# Patient Record
Sex: FEMALE | Race: WHITE | Employment: OTHER | URBAN - METROPOLITAN AREA
[De-identification: names, ages, dates, MRNs, and addresses within clinical notes are randomized per-mention and may not be internally consistent; named-entity substitution may affect disease eponyms.]

---

## 2017-03-07 ENCOUNTER — OFFICE VISIT (OUTPATIENT)
Dept: FAMILY MEDICINE CLINIC | Facility: CLINIC | Age: 72
End: 2017-03-07

## 2017-03-07 VITALS
HEIGHT: 62 IN | TEMPERATURE: 98 F | SYSTOLIC BLOOD PRESSURE: 124 MMHG | DIASTOLIC BLOOD PRESSURE: 68 MMHG | HEART RATE: 78 BPM | BODY MASS INDEX: 32.76 KG/M2 | RESPIRATION RATE: 16 BRPM | WEIGHT: 178 LBS

## 2017-03-07 DIAGNOSIS — M54.42 CHRONIC LEFT-SIDED LOW BACK PAIN WITH LEFT-SIDED SCIATICA: ICD-10-CM

## 2017-03-07 DIAGNOSIS — I10 ESSENTIAL HYPERTENSION: Primary | ICD-10-CM

## 2017-03-07 DIAGNOSIS — M79.89 LEG SWELLING: ICD-10-CM

## 2017-03-07 DIAGNOSIS — G89.29 CHRONIC LEFT-SIDED LOW BACK PAIN WITH LEFT-SIDED SCIATICA: ICD-10-CM

## 2017-03-07 DIAGNOSIS — I48.0 PAROXYSMAL A-FIB (HCC): ICD-10-CM

## 2017-03-07 DIAGNOSIS — E78.5 DYSLIPIDEMIA: ICD-10-CM

## 2017-03-07 DIAGNOSIS — R05.8 ACE-INHIBITOR COUGH: ICD-10-CM

## 2017-03-07 DIAGNOSIS — G89.29 CHRONIC LEFT SHOULDER PAIN: ICD-10-CM

## 2017-03-07 DIAGNOSIS — T46.4X5A ACE-INHIBITOR COUGH: ICD-10-CM

## 2017-03-07 DIAGNOSIS — C34.92 ADENOCARCINOMA OF LUNG, LEFT (HCC): ICD-10-CM

## 2017-03-07 DIAGNOSIS — M25.512 CHRONIC LEFT SHOULDER PAIN: ICD-10-CM

## 2017-03-07 DIAGNOSIS — I44.0 1ST DEGREE AV BLOCK: ICD-10-CM

## 2017-03-07 PROCEDURE — 99214 OFFICE O/P EST MOD 30 MIN: CPT | Performed by: INTERNAL MEDICINE

## 2017-03-07 PROCEDURE — 20610 DRAIN/INJ JOINT/BURSA W/O US: CPT | Performed by: INTERNAL MEDICINE

## 2017-03-07 RX ORDER — METHYLPREDNISOLONE ACETATE 40 MG/ML
40 INJECTION, SUSPENSION INTRA-ARTICULAR; INTRALESIONAL; INTRAMUSCULAR; SOFT TISSUE ONCE
Status: COMPLETED | OUTPATIENT
Start: 2017-03-07 | End: 2017-03-07

## 2017-03-07 RX ORDER — TRIAMTERENE AND HYDROCHLOROTHIAZIDE 37.5; 25 MG/1; MG/1
2 CAPSULE ORAL EVERY MORNING
Qty: 180 CAPSULE | Refills: 0 | Status: SHIPPED | OUTPATIENT
Start: 2017-03-07 | End: 2017-06-08

## 2017-03-07 RX ORDER — AMITRIPTYLINE HYDROCHLORIDE 100 MG/1
100 TABLET, FILM COATED ORAL 2 TIMES DAILY
Qty: 180 TABLET | Refills: 3 | Status: SHIPPED | OUTPATIENT
Start: 2017-03-07 | End: 2017-06-08

## 2017-03-07 RX ORDER — TRAMADOL HYDROCHLORIDE 50 MG/1
50 TABLET ORAL EVERY 8 HOURS PRN
Qty: 30 TABLET | Refills: 1 | Status: SHIPPED | COMMUNITY
Start: 2017-03-07 | End: 2017-06-08

## 2017-03-07 RX ORDER — TRIAMTERENE AND HYDROCHLOROTHIAZIDE 37.5; 25 MG/1; MG/1
1 CAPSULE ORAL EVERY MORNING
Qty: 90 CAPSULE | Refills: 0 | Status: SHIPPED | OUTPATIENT
Start: 2017-03-07 | End: 2017-03-07

## 2017-03-07 RX ADMIN — METHYLPREDNISOLONE ACETATE 40 MG: 40 INJECTION, SUSPENSION INTRA-ARTICULAR; INTRALESIONAL; INTRAMUSCULAR; SOFT TISSUE at 14:35:00

## 2017-03-07 NOTE — PROGRESS NOTES
[facility] Internal Medicine Office Note  Chief Complaint:   Patient presents with:  Edema: swelling in both legs and feet   Back Pain: lower back/ pain goes into left hip   Cough: productive/dry cough x 1 year  afib  And 1st deg av block  Should Comment:Caps  Mevacor [Lovastatin]    Swelling    Comment:Tabs  Neurontin [Gabapent*    Swelling    Comment:Tabs;  Radiology Contrast *      Tegretol                Swelling    Comment:Tabs;    Current Outpatient Prescriptions:  Amitriptyline HCl 100 MG Or signs reviewed. Appears stated age, well groomed. With grey hair  Physical Exam    Constitutional: She is obese. She appears well-developed. HENT:   Head: Normocephalic. Cardiovascular: Normal rate. Murmur heard.   Pulmonary/Chest: Effort normal. No injection/aspiration with a 20gauge needle  Injectate: 1ml of 40mg Methylprednisolone  Bandage applied  Specimen Sent: no  Patient tolerated well without complications and wound care instructions given  Followup prn      ACE-inhibitor cough newly dx stop l done. Outcome: Patient verbalizes understanding. Patient is notified to call with any questions, complications, allergies, or worsening or changing symptoms.  Patient is to call with any side effects or complications from the treatments as a result of today

## 2017-03-07 NOTE — PATIENT INSTRUCTIONS
Thank you for choosing Pina Quan MD at [place] [address]  To Do: Shearon Blizzard  1. Use tramadol for pain as needed  2. Steroid injection to shoulder- leave bandage on for 1 day  3.  Stop amlodipine due to leg swelling  Stop lisinopril due to cough  4 and for your safety to discuss with the pharmacist and our office with questions, and to notify us and stop treatment if problems arise, but know that our intention is that the benefits outweigh those potential risks and we strive to make you healthier and

## 2017-03-08 ENCOUNTER — TELEPHONE (OUTPATIENT)
Dept: FAMILY MEDICINE CLINIC | Facility: CLINIC | Age: 72
End: 2017-03-08

## 2017-03-08 NOTE — TELEPHONE ENCOUNTER
Spoke with patient and advised her that she could contact her insurance company to get a covered alternative. Patient stated that she has already contacted her insurance company and she is not able to take any of the alternatives as she has side effects.  P

## 2017-03-08 NOTE — TELEPHONE ENCOUNTER
Please advise patient she must call insurance for alternatives. Pharmacy would not have this information. Ask her to call office back with covered or preferred alternatives after she speaks with insurance co. Thank you!

## 2017-03-08 NOTE — TELEPHONE ENCOUNTER
I was speaking with patient and chart closed. She is on Amitriptyline 100mg daily. She uses for Chiari malformation. I checked good rx and patient can get 30 day supply for $4 at [facility]. She is going to call them and see about filling it there.   She dane

## 2017-04-24 ENCOUNTER — LAB ENCOUNTER (OUTPATIENT)
Dept: LAB | Age: 72
End: 2017-04-24
Attending: INTERNAL MEDICINE
Payer: MEDICARE

## 2017-04-24 ENCOUNTER — HOSPITAL ENCOUNTER (OUTPATIENT)
Dept: CT IMAGING | Age: 72
Discharge: HOME | End: 2017-04-24
Attending: INTERNAL MEDICINE
Payer: MEDICARE

## 2017-04-24 DIAGNOSIS — C34.12 CANCER OF UPPER LOBE OF LEFT LUNG (HCC): ICD-10-CM

## 2017-04-24 PROCEDURE — 83615 LACTATE (LD) (LDH) ENZYME: CPT

## 2017-04-24 PROCEDURE — 85025 COMPLETE CBC W/AUTO DIFF WBC: CPT

## 2017-04-24 PROCEDURE — 36415 COLL VENOUS BLD VENIPUNCTURE: CPT

## 2017-04-24 PROCEDURE — 71250 CT THORAX DX C-: CPT

## 2017-04-24 PROCEDURE — 80053 COMPREHEN METABOLIC PANEL: CPT

## 2017-05-02 ENCOUNTER — OFFICE VISIT (OUTPATIENT)
Dept: HEMATOLOGY/ONCOLOGY | Age: 72
End: 2017-05-02
Attending: INTERNAL MEDICINE
Payer: MEDICARE

## 2017-05-02 VITALS
BODY MASS INDEX: 33 KG/M2 | DIASTOLIC BLOOD PRESSURE: 53 MMHG | HEART RATE: 76 BPM | RESPIRATION RATE: 20 BRPM | OXYGEN SATURATION: 98 % | SYSTOLIC BLOOD PRESSURE: 129 MMHG | WEIGHT: 178 LBS | TEMPERATURE: 98 F

## 2017-05-02 DIAGNOSIS — C34.12 MALIGNANT NEOPLASM OF UPPER LOBE OF LEFT LUNG (HCC): Primary | ICD-10-CM

## 2017-05-02 DIAGNOSIS — D64.9 NORMOCYTIC NORMOCHROMIC ANEMIA: ICD-10-CM

## 2017-05-02 PROCEDURE — 99214 OFFICE O/P EST MOD 30 MIN: CPT | Performed by: INTERNAL MEDICINE

## 2017-05-02 NOTE — PROGRESS NOTES
Cancer Center Progress Note    Patient Name: Travis Gasca   YOB: 1945   Medical Record Number: [id number]   CSN: [id number]   Attending Physician: Oscar Moon M.D.    Referring Physician: Ag Guy MD      Date of Visit: 5/2/2017     PILAR D3) 250-200 MG-UNIT Oral Tab, Take 1 Tab by mouth daily. , Disp: , Rfl:   •  Pediatric Multivit-Minerals-C (FLINTSTONES GUMMIES) Oral Chew Tab, Chew 1 Tab by mouth daily. , Disp: , Rfl:     Past Medical History:  Past Medical History   Diagnosis Date   • Spi SURGICAL HISTORY  1993    Comment craniotomy chiari repair evanston    SHOULDER SURG PROC UNLISTED  1/1/2004    Comment right    OTHER SURGICAL HISTORY  1990    Comment spine repair spina bifida    GASTRIC BYPASS,OBESITY,SB RECONSTRUC  8/2012    Comment mu Comment:Tabs;     Review of Systems:  A 14-point ROS was done with pertinent positives and negative per the HPI    Vital Signs:  /53 mmHg  Pulse 76  Temp(Src) 98.1 °F (36.7 °C) (Tympanic)  Resp 20  Wt 80.74 kg (178 lb)  SpO2 98%      Physical Examina STATED HISTORY: (As transcribed by Technologist)  Patient here for her 6 month follow up appointment for lung cancer. Patient had cancer in her left upper lobe and surgery for it in [place] of 2014.  Patient does not have any other complants.           FIND responded well to IV iron. Recent work-up not c/w iron deficiency. Likely anemia of chronic disease. Continues to decline this. May reconsider if Hgb drops to <10. Labs reviewed with her. RTC 6 months.      Emotional Well Being:  I have assessed the

## 2017-05-02 NOTE — PROGRESS NOTES
Pt here for 6 month MD f/u. Pt notes energy level is low, dealing with a lot of stress and anxiety with 's health. Pt had CT scan 4/24 and labs last week as well. Appetite has been good.  Pt notes burning pain in chest, believes it's stress, is inter

## 2017-05-05 RX ORDER — OMEPRAZOLE 20 MG/1
CAPSULE, DELAYED RELEASE ORAL
Qty: 90 CAPSULE | Refills: 1 | Status: SHIPPED | OUTPATIENT
Start: 2017-05-05 | End: 2017-10-31

## 2017-06-05 NOTE — TELEPHONE ENCOUNTER
Requesting Triamterene-HCTZ  LOV: 3/7/17  RTC: 1-3 months  Last Labs: 4/24/17  Filled: 3/7/17 #180 with 0 refills    Future Appointments  Date Time Provider Nicolas Prakash   6/8/2017 1:30 PM Corie Looney MD [facility] [address] [facility]   11/7/2017 10:00 AM Drake Cobb

## 2017-06-08 ENCOUNTER — TELEPHONE (OUTPATIENT)
Dept: FAMILY MEDICINE CLINIC | Facility: CLINIC | Age: 72
End: 2017-06-08

## 2017-06-08 ENCOUNTER — LAB ENCOUNTER (OUTPATIENT)
Dept: LAB | Age: 72
End: 2017-06-08
Attending: INTERNAL MEDICINE
Payer: MEDICARE

## 2017-06-08 ENCOUNTER — OFFICE VISIT (OUTPATIENT)
Dept: FAMILY MEDICINE CLINIC | Facility: CLINIC | Age: 72
End: 2017-06-08

## 2017-06-08 VITALS
RESPIRATION RATE: 16 BRPM | WEIGHT: 178 LBS | HEIGHT: 62 IN | DIASTOLIC BLOOD PRESSURE: 78 MMHG | HEART RATE: 62 BPM | TEMPERATURE: 98 F | SYSTOLIC BLOOD PRESSURE: 120 MMHG | BODY MASS INDEX: 32.76 KG/M2

## 2017-06-08 DIAGNOSIS — E03.1 CONGENITAL HYPOTHYROIDISM WITHOUT GOITER: ICD-10-CM

## 2017-06-08 DIAGNOSIS — G89.29 CHRONIC LEFT SHOULDER PAIN: ICD-10-CM

## 2017-06-08 DIAGNOSIS — N17.9 ACUTE RENAL FAILURE, UNSPECIFIED ACUTE RENAL FAILURE TYPE (HCC): ICD-10-CM

## 2017-06-08 DIAGNOSIS — I10 ESSENTIAL HYPERTENSION: ICD-10-CM

## 2017-06-08 DIAGNOSIS — E03.1 CONGENITAL HYPOTHYROIDISM WITHOUT GOITER: Primary | ICD-10-CM

## 2017-06-08 DIAGNOSIS — M25.512 CHRONIC LEFT SHOULDER PAIN: ICD-10-CM

## 2017-06-08 DIAGNOSIS — M79.7 FIBROMYALGIA: ICD-10-CM

## 2017-06-08 PROCEDURE — 36415 COLL VENOUS BLD VENIPUNCTURE: CPT

## 2017-06-08 PROCEDURE — 84443 ASSAY THYROID STIM HORMONE: CPT

## 2017-06-08 PROCEDURE — 99214 OFFICE O/P EST MOD 30 MIN: CPT | Performed by: INTERNAL MEDICINE

## 2017-06-08 PROCEDURE — 80048 BASIC METABOLIC PNL TOTAL CA: CPT

## 2017-06-08 PROCEDURE — 84481 FREE ASSAY (FT-3): CPT

## 2017-06-08 RX ORDER — TRIAMTERENE AND HYDROCHLOROTHIAZIDE 37.5; 25 MG/1; MG/1
2 CAPSULE ORAL EVERY MORNING
Qty: 180 CAPSULE | Refills: 3 | Status: SHIPPED | OUTPATIENT
Start: 2017-06-08 | End: 2018-01-30

## 2017-06-08 RX ORDER — TRAMADOL HYDROCHLORIDE 50 MG/1
50 TABLET ORAL EVERY 8 HOURS PRN
Qty: 90 TABLET | Refills: 1 | Status: SHIPPED
Start: 2017-06-08 | End: 2017-10-31

## 2017-06-08 RX ORDER — AMITRIPTYLINE HYDROCHLORIDE 100 MG/1
100 TABLET, FILM COATED ORAL 2 TIMES DAILY
Qty: 180 TABLET | Refills: 3 | Status: SHIPPED | OUTPATIENT
Start: 2017-06-08 | End: 2017-10-31

## 2017-06-08 RX ORDER — TRIAMTERENE AND HYDROCHLOROTHIAZIDE 37.5; 25 MG/1; MG/1
CAPSULE ORAL
Qty: 90 CAPSULE | Refills: 0 | OUTPATIENT
Start: 2017-06-08

## 2017-06-08 NOTE — PROGRESS NOTES
[facility] Internal Medicine Office Note  Chief Complaint:   Patient presents with:  Edema  Blood Pressure  Shoulder Pain  Back Pain  low thyroid  arf  HPI:   This is a 66year old female coming in for  HPI  Chronic L shoulder pain  Pt declined h MG Oral Tab Take 1 tablet (100 mg total) by mouth 2 (two) times daily. Disp: 180 tablet Rfl: 3   TraMADol HCl 50 MG Oral Tab Take 1 tablet (50 mg total) by mouth every 8 (eight) hours as needed for Pain (back and shoulder pain).  Disp: 90 tablet Rfl: 1   OM Musculoskeletal: She exhibits tenderness. ttp behind L shoulder with dec rom   Neurological: She displays normal reflexes. Coordination and gait normal.   Skin: No rash noted. Psychiatric: She has a normal mood and affect.        ASSESSMENT AND PLAN: morning. For leg swelling  -     Amitriptyline HCl 100 MG Oral Tab; Take 1 tablet (100 mg total) by mouth 2 (two) times daily.  -     TraMADol HCl 50 MG Oral Tab;  Take 1 tablet (50 mg total) by mouth every 8 (eight) hours as needed for Pain (back and shoul [facility])     Adenocarcinoma of lung ([address]     Cataract     Right hip pain     Left shoulder pain     Post-thoracotomy pain     Fibromyalgia     Normocytic normochromic anemia     Tinnitus of both ears     Gallstones     Hearing loss in left ear     Chronic le

## 2017-06-08 NOTE — PATIENT INSTRUCTIONS
Thank you for choosing Edy Scott MD at [place] [address]  To Do: Sanjay Ramirez  1. Try the pennsaid to shoulder- massage a little inside there daily  2.  Checkup dr marin in 6-8 month    • Please signup for [facility] CHART, which is electronic access to your your quality of life.     Referrals, and Radiology Information:    If your insurance requires a referral to a specialist, please allow 5 business days to process your referral request.    If Lidya Lockhart MD orders a CT or MRI, it may take up to 10 business

## 2017-06-09 DIAGNOSIS — R94.4 DECREASED GFR: Primary | ICD-10-CM

## 2017-06-09 RX ORDER — HYDROCHLOROTHIAZIDE 25 MG/1
25 TABLET ORAL DAILY
Qty: 30 TABLET | Refills: 0 | Status: SHIPPED | OUTPATIENT
Start: 2017-06-09 | End: 2017-10-31

## 2017-06-09 NOTE — PROGRESS NOTES
Quick Note:    Yimi Lopez her kidney function isn't that great still- can you tell her to stop her triamterene pill. Just change her to hctz 25mg daily- please order it disp 30, and tell her to drink more water, retest bmp in 2-3 weeks, Please notify her of that.

## 2017-06-09 NOTE — TELEPHONE ENCOUNTER
Prior auth for amitriptyline 100mg twice a day has been approved from 3/10/17-6/8/18.  Pharmacy is aware

## 2017-06-16 ENCOUNTER — TELEPHONE (OUTPATIENT)
Dept: FAMILY MEDICINE CLINIC | Facility: CLINIC | Age: 72
End: 2017-06-16

## 2017-06-16 DIAGNOSIS — N18.9 CHRONIC KIDNEY DISEASE, UNSPECIFIED CKD STAGE: Primary | ICD-10-CM

## 2017-06-16 RX ORDER — HYDRALAZINE HYDROCHLORIDE 25 MG/1
25 TABLET, FILM COATED ORAL 3 TIMES DAILY
Qty: 90 TABLET | Refills: 0 | Status: SHIPPED | OUTPATIENT
Start: 2017-06-16 | End: 2017-10-31

## 2017-06-16 NOTE — TELEPHONE ENCOUNTER
Patient informed of MD recommendations, patient verbalized understanding with intent to comply. All questions were answered.     Future Appointments  Date Time Provider Nicolas Prakash   11/1/2017 9:15 AM DEDE SEGAL [facility]   11/1/2017 9:

## 2017-06-16 NOTE — TELEPHONE ENCOUNTER
Patient states that her blood pressure medication, currently taking hydrochlorothiazide 25 MG Oral Tab  Is not working for her.  She reports swelling in bilateral feet, that started to increase on Wednesday, pt states that she feels like she is retaining wa

## 2017-06-16 NOTE — TELEPHONE ENCOUNTER
There are a lot of things happening here    Her BP is high but her kidneys are bad    i need her to make an appt with dr Marcela Zavala renal - please tell her to do that    bp and kidney issues are connected    Start hydralazine 25mg three times daily for HYPERTENS

## 2017-07-06 RX ORDER — HYDROCHLOROTHIAZIDE 25 MG/1
TABLET ORAL
Qty: 30 TABLET | Refills: 0 | OUTPATIENT
Start: 2017-07-06

## 2017-07-06 NOTE — TELEPHONE ENCOUNTER
Requesting Hydrochlorothiazide 25mg daily  LOV: 6/8/17  RTC:   Last Labs:   Filled:  # with  refills    Future Appointments  Date Time Provider Nicolas Martisti   7/20/2017 3:30 PM Rob Tarango MD [facility]   11/1/2017 9:15 AM  [facility]

## 2017-07-20 ENCOUNTER — OFFICE VISIT (OUTPATIENT)
Dept: NEPHROLOGY | Facility: CLINIC | Age: 72
End: 2017-07-20

## 2017-07-20 VITALS
RESPIRATION RATE: 16 BRPM | DIASTOLIC BLOOD PRESSURE: 78 MMHG | HEART RATE: 74 BPM | SYSTOLIC BLOOD PRESSURE: 122 MMHG | WEIGHT: 191 LBS | BODY MASS INDEX: 35 KG/M2

## 2017-07-20 DIAGNOSIS — N18.30 CKD (CHRONIC KIDNEY DISEASE) STAGE 3, GFR 30-59 ML/MIN (HCC): ICD-10-CM

## 2017-07-20 DIAGNOSIS — D64.9 ANEMIA, UNSPECIFIED TYPE: ICD-10-CM

## 2017-07-20 DIAGNOSIS — N17.9 AKI (ACUTE KIDNEY INJURY) (HCC): Primary | ICD-10-CM

## 2017-07-20 PROCEDURE — 99204 OFFICE O/P NEW MOD 45 MIN: CPT | Performed by: INTERNAL MEDICINE

## 2017-07-20 NOTE — PROGRESS NOTES
Nephrology Consult Note    REASON FOR CONSULT: SHAD / CKD 3    ASSESSMENT/PLAN:        1) SHAD / CKD 3- progressive renal dysfunction with serum creatinine increasing from 1.1->2.2 mg/dL since October 2016 is suggestive of intrinsic renal disease such as an or palpitations  Denies SOB/cough/hemoptysis  Denies abd or flank pain  Denies N/V/D  Denies change in urinary habits or gross hematuria  Denies LE edema  Denies skin rashes/myalgias/arthralgias    PMH:  Past Medical History:   Diagnosis Date   • Adenocarc thyroid        PSH:  Past Surgical History:  2010: ABLATION      Comment: svt ablation in sept 2010  No date: APPENDECTOMY  1990: BRAIN SURGERY      Comment: cervical shunt  2016: COLONOSCOPY  8/2012: GASTRIC BYPASS,OBESITY,SB RECONSTRUC      Comment: catalina DAILY Disp: 90 capsule Rfl: 1   METOPROLOL SUCCINATE  MG Oral Tablet 24 Hr TAKE ONE TABLET BY MOUTH ONCE DAILY Disp: 90 tablet Rfl: 3   Levothyroxine Sodium 50 MCG Oral Tab TAKE ONE TABLET BY MOUTH ONCE DAILY Disp: 90 tablet Rfl: 3   aspirin  M MMM  Neck: Supple, no TACHO or thyromegaly  Cardiac: Regular rate and rhythm, S1, S2 normal, no murmur or rub  Lungs: Clear without wheezes, rales, rhonchi.     Abdomen: Soft, non-tender. + bowel sounds, no palpable organomegaly  Extremities: Without clubbing

## 2017-08-03 RX ORDER — LEVOTHYROXINE SODIUM 0.05 MG/1
TABLET ORAL
Qty: 90 TABLET | Refills: 1 | Status: SHIPPED | OUTPATIENT
Start: 2017-08-03 | End: 2018-01-31

## 2017-08-03 NOTE — TELEPHONE ENCOUNTER
Requesting Levothyroxine 50mcg daily  LOV: 6/8/17  RTC: 6 months  Last Labs: 6/8/17  Filled: 8/5/16 #90 with 3 refills    Future Appointments  Date Time Provider Nicolas Prakash   11/1/2017 9:15 AM PF [facility] PF OUTPT [facility]   11/1/2017 9:30 AM

## 2017-08-15 ENCOUNTER — LAB ENCOUNTER (OUTPATIENT)
Dept: LAB | Age: 72
End: 2017-08-15
Attending: INTERNAL MEDICINE
Payer: MEDICARE

## 2017-08-15 ENCOUNTER — HOSPITAL ENCOUNTER (OUTPATIENT)
Dept: ULTRASOUND IMAGING | Age: 72
Discharge: HOME | End: 2017-08-15
Attending: INTERNAL MEDICINE
Payer: MEDICARE

## 2017-08-15 DIAGNOSIS — D64.9 ANEMIA, UNSPECIFIED TYPE: ICD-10-CM

## 2017-08-15 DIAGNOSIS — N17.9 AKI (ACUTE KIDNEY INJURY) (HCC): ICD-10-CM

## 2017-08-15 DIAGNOSIS — N18.30 CKD (CHRONIC KIDNEY DISEASE) STAGE 3, GFR 30-59 ML/MIN (HCC): ICD-10-CM

## 2017-08-15 LAB
ANA SCREEN: NEGATIVE
BASOPHILS # BLD AUTO: 0.03 X10(3) UL (ref 0–0.1)
BASOPHILS NFR BLD AUTO: 0.6 %
BILIRUB UR QL STRIP.AUTO: NEGATIVE
BUN BLD-MCNC: 18 MG/DL (ref 8–20)
CALCIUM BLD-MCNC: 8.8 MG/DL (ref 8.3–10.3)
CHLORIDE: 112 MMOL/L (ref 101–111)
CLARITY UR REFRACT.AUTO: CLEAR
CO2: 21 MMOL/L (ref 22–32)
COLOR UR AUTO: YELLOW
COMPLEMENT C3: 98.8 MG/DL (ref 90–180)
COMPLEMENT C4: 24 MG/DL (ref 10–40)
CREAT BLD-MCNC: 1.44 MG/DL (ref 0.55–1.02)
CREAT UR-SCNC: 59.1 MG/DL
EOSINOPHIL # BLD AUTO: 0.24 X10(3) UL (ref 0–0.3)
EOSINOPHIL NFR BLD AUTO: 4.5 %
ERYTHROCYTE [DISTWIDTH] IN BLOOD BY AUTOMATED COUNT: 14.9 % (ref 11.5–16)
GLUCOSE BLD-MCNC: 88 MG/DL (ref 70–99)
GLUCOSE UR STRIP.AUTO-MCNC: NEGATIVE MG/DL
HCT VFR BLD AUTO: 28.7 % (ref 34–50)
HGB BLD-MCNC: 9 G/DL (ref 12–16)
IMMATURE GRANULOCYTE COUNT: 0.01 X10(3) UL (ref 0–1)
IMMATURE GRANULOCYTE RATIO %: 0.2 %
KETONES UR STRIP.AUTO-MCNC: NEGATIVE MG/DL
LYMPHOCYTES # BLD AUTO: 1.5 X10(3) UL (ref 0.9–4)
LYMPHOCYTES NFR BLD AUTO: 28.2 %
MCH RBC QN AUTO: 27.4 PG (ref 27–33.2)
MCHC RBC AUTO-ENTMCNC: 31.4 G/DL (ref 31–37)
MCV RBC AUTO: 87.2 FL (ref 81–100)
MONOCYTES # BLD AUTO: 0.54 X10(3) UL (ref 0.1–0.6)
MONOCYTES NFR BLD AUTO: 10.2 %
NEUTROPHIL ABS PRELIM: 2.99 X10 (3) UL (ref 1.3–6.7)
NEUTROPHILS # BLD AUTO: 2.99 X10(3) UL (ref 1.3–6.7)
NEUTROPHILS NFR BLD AUTO: 56.3 %
NITRITE UR QL STRIP.AUTO: NEGATIVE
PH UR STRIP.AUTO: 5 [PH] (ref 4.5–8)
PLATELET # BLD AUTO: 290 10(3)UL (ref 150–450)
POTASSIUM SERPL-SCNC: 4 MMOL/L (ref 3.6–5.1)
PROT UR STRIP.AUTO-MCNC: NEGATIVE MG/DL
PROT UR-MCNC: 16.6 MG/DL
RBC # BLD AUTO: 3.29 X10(6)UL (ref 3.8–5.1)
RBC UR QL AUTO: NEGATIVE
RED CELL DISTRIBUTION WIDTH-SD: 47.9 FL (ref 35.1–46.3)
SODIUM SERPL-SCNC: 143 MMOL/L (ref 136–144)
SP GR UR STRIP.AUTO: 1.01 (ref 1–1.03)
UROBILINOGEN UR STRIP.AUTO-MCNC: <2 MG/DL
WBC # BLD AUTO: 5.3 X10(3) UL (ref 4–13)

## 2017-08-15 PROCEDURE — 80048 BASIC METABOLIC PNL TOTAL CA: CPT

## 2017-08-15 PROCEDURE — 86334 IMMUNOFIX E-PHORESIS SERUM: CPT

## 2017-08-15 PROCEDURE — 83876 ASSAY MYELOPEROXIDASE: CPT

## 2017-08-15 PROCEDURE — 86255 FLUORESCENT ANTIBODY SCREEN: CPT

## 2017-08-15 PROCEDURE — 83516 IMMUNOASSAY NONANTIBODY: CPT

## 2017-08-15 PROCEDURE — 36415 COLL VENOUS BLD VENIPUNCTURE: CPT

## 2017-08-15 PROCEDURE — 76770 US EXAM ABDO BACK WALL COMP: CPT | Performed by: INTERNAL MEDICINE

## 2017-08-15 PROCEDURE — 83883 ASSAY NEPHELOMETRY NOT SPEC: CPT

## 2017-08-15 PROCEDURE — 85025 COMPLETE CBC W/AUTO DIFF WBC: CPT

## 2017-08-15 PROCEDURE — 82570 ASSAY OF URINE CREATININE: CPT

## 2017-08-15 PROCEDURE — 86038 ANTINUCLEAR ANTIBODIES: CPT

## 2017-08-15 PROCEDURE — 81001 URINALYSIS AUTO W/SCOPE: CPT

## 2017-08-15 PROCEDURE — 87086 URINE CULTURE/COLONY COUNT: CPT

## 2017-08-15 PROCEDURE — 84156 ASSAY OF PROTEIN URINE: CPT

## 2017-08-15 PROCEDURE — 86160 COMPLEMENT ANTIGEN: CPT

## 2017-08-15 PROCEDURE — 84165 PROTEIN E-PHORESIS SERUM: CPT

## 2017-08-17 LAB
GBM AB, IGG (MAFD): 0 AU/ML
MYELOPEROX ANTIBODIES, IGG: 0 AU/ML
SERINE PROTEASE3, IGG: 9 AU/ML

## 2017-08-18 ENCOUNTER — TELEPHONE (OUTPATIENT)
Dept: NEPHROLOGY | Facility: CLINIC | Age: 72
End: 2017-08-18

## 2017-08-18 LAB
A/G RATIO: 1.65
ALBUMIN, SERUM: 3.74 G/DL (ref 3.5–4.8)
ALPHA-1 GLOBULIN: 0.19 G/DL (ref 0.1–0.3)
ALPHA-2 GLOBULIN: 0.79 G/DL (ref 0.6–1)
BETA GLOBULIN: 0.74 G/DL (ref 0.7–1.2)
GAMMA GLOBULIN: 0.54 G/DL (ref 0.6–1.6)
KAPPA FREE LIGHT CHAIN: 3.29 MG/DL (ref 0.33–1.94)
KAPPA/LAMBDA FLC RATIO: 1.51 (ref 0.26–1.65)
LAMBDA FREE LIGHT CHAIN: 2.18 MG/DL (ref 0.57–2.63)
TOTAL PROTEIN,SERUM: 6 G/DL (ref 6.1–8.3)

## 2017-08-18 RX ORDER — METOPROLOL SUCCINATE 100 MG/1
TABLET, EXTENDED RELEASE ORAL
Qty: 90 TABLET | Refills: 3 | Status: SHIPPED | OUTPATIENT
Start: 2017-08-18 | End: 2018-08-17

## 2017-08-18 NOTE — TELEPHONE ENCOUNTER
Requesting Metoprolol succinate ER 100mg  LOV: 6/8/17  RTC: 6 mos  Last Labs: 8/15/17  Filled: 8/22/16 #90 with 3 refills  Passes protocol  Future Appointments  Date Time Provider Nicolas Prakash   11/1/2017 9:15 AM DEDE AGUAYO OUTPT [facility]

## 2017-08-22 NOTE — TELEPHONE ENCOUNTER
Discussed with pt- repeat Cr much improved at 1.4 mg/dl; UA bland; no proteinuria / hematuria; SPEP / serologies all neg; US with nonspecific findings c/w CKD- meds benign- no need for further w/u; OK to continue usual thiazide / PPI- will f/u prn- thx fan

## 2017-10-30 RX ORDER — OMEPRAZOLE 20 MG/1
CAPSULE, DELAYED RELEASE ORAL
Qty: 90 CAPSULE | Refills: 1 | Status: CANCELLED | OUTPATIENT
Start: 2017-10-30

## 2017-10-31 ENCOUNTER — OFFICE VISIT (OUTPATIENT)
Dept: FAMILY MEDICINE CLINIC | Facility: CLINIC | Age: 72
End: 2017-10-31

## 2017-10-31 VITALS
SYSTOLIC BLOOD PRESSURE: 130 MMHG | WEIGHT: 190 LBS | TEMPERATURE: 98 F | HEART RATE: 68 BPM | HEIGHT: 62 IN | DIASTOLIC BLOOD PRESSURE: 66 MMHG | RESPIRATION RATE: 16 BRPM | BODY MASS INDEX: 34.96 KG/M2

## 2017-10-31 DIAGNOSIS — J01.10 ACUTE NON-RECURRENT FRONTAL SINUSITIS: ICD-10-CM

## 2017-10-31 DIAGNOSIS — E03.1 CONGENITAL HYPOTHYROIDISM WITHOUT GOITER: Primary | ICD-10-CM

## 2017-10-31 DIAGNOSIS — M25.512 CHRONIC LEFT SHOULDER PAIN: ICD-10-CM

## 2017-10-31 DIAGNOSIS — N18.30 CKD (CHRONIC KIDNEY DISEASE) STAGE 3, GFR 30-59 ML/MIN (HCC): ICD-10-CM

## 2017-10-31 DIAGNOSIS — G89.29 CHRONIC LEFT SHOULDER PAIN: ICD-10-CM

## 2017-10-31 DIAGNOSIS — M79.7 FIBROMYALGIA: ICD-10-CM

## 2017-10-31 DIAGNOSIS — R52 WHOLE BODY PAIN: ICD-10-CM

## 2017-10-31 PROCEDURE — 99214 OFFICE O/P EST MOD 30 MIN: CPT | Performed by: INTERNAL MEDICINE

## 2017-10-31 RX ORDER — MONTELUKAST SODIUM 10 MG/1
10 TABLET ORAL NIGHTLY
Qty: 30 TABLET | Refills: 0 | Status: SHIPPED | OUTPATIENT
Start: 2017-10-31 | End: 2017-12-14 | Stop reason: ALTCHOICE

## 2017-10-31 RX ORDER — OMEPRAZOLE 20 MG/1
CAPSULE, DELAYED RELEASE ORAL
Qty: 90 CAPSULE | Refills: 3 | Status: SHIPPED | OUTPATIENT
Start: 2017-10-31 | End: 2018-04-23 | Stop reason: ALTCHOICE

## 2017-10-31 RX ORDER — TRAMADOL HYDROCHLORIDE 50 MG/1
100 TABLET ORAL EVERY 8 HOURS PRN
Qty: 180 TABLET | Refills: 1 | Status: SHIPPED
Start: 2017-10-31 | End: 2018-01-05

## 2017-10-31 RX ORDER — LORATADINE AND PSEUDOEPHEDRINE 10; 240 MG/1; MG/1
1 TABLET, EXTENDED RELEASE ORAL DAILY
Qty: 20 TABLET | Refills: 1 | Status: SHIPPED
Start: 2017-10-31 | End: 2017-12-14 | Stop reason: ALTCHOICE

## 2017-10-31 RX ORDER — AMITRIPTYLINE HYDROCHLORIDE 100 MG/1
100 TABLET, FILM COATED ORAL 2 TIMES DAILY
Qty: 180 TABLET | Refills: 3 | Status: SHIPPED | OUTPATIENT
Start: 2017-10-31 | End: 2018-11-05

## 2017-10-31 RX ORDER — IPRATROPIUM BROMIDE 42 UG/1
2 SPRAY, METERED NASAL 4 TIMES DAILY
Qty: 15 ML | Refills: 0 | Status: SHIPPED | OUTPATIENT
Start: 2017-10-31 | End: 2017-12-14 | Stop reason: ALTCHOICE

## 2017-10-31 RX ORDER — TRAMADOL HYDROCHLORIDE 50 MG/1
50 TABLET ORAL EVERY 8 HOURS PRN
Qty: 90 TABLET | Refills: 1 | Status: SHIPPED
Start: 2017-10-31 | End: 2017-10-31

## 2017-10-31 RX ORDER — MONTELUKAST SODIUM 10 MG/1
10 TABLET ORAL NIGHTLY
Qty: 30 TABLET | Refills: 0 | Status: SHIPPED | OUTPATIENT
Start: 2017-10-31 | End: 2017-10-31

## 2017-10-31 NOTE — PATIENT INSTRUCTIONS
Thank you for choosing Jimmy Mcnally MD at [place] [address]  To Do: Graham Leventhal  1.  Pickup at [address] 10mg daily, plus claritin D once a day, atrovent nasal spray on R nostril to clear out the ear  2. amitryptyline refilled at [facility] For your safety, read the entire package insert of all medicines prescribed to you and be aware of all of the risks of treatment even beyond those discussed today.  All therapies have potential risk of harm or side effects or medication interactions.  It i

## 2017-10-31 NOTE — PROGRESS NOTES
[facility] Internal Medicine Office Note  Chief Complaint:   Patient presents with:  Back Pain: low back pain down to leg   Tinnitus: thyroid  Fibromyalgia  ckd    HPI:   This is a 74year old female coming in for  [facility]  Chronic care fu  1.  Fibro Nasal Solution 2 sprays by Nasal route 4 (four) times daily. Disp: 15 mL Rfl: 0   Montelukast Sodium 10 MG Oral Tab Take 1 tablet (10 mg total) by mouth nightly.  Sinus congestion Disp: 30 tablet Rfl: 0   Amitriptyline HCl 100 MG Oral Tab Take 1 tablet (100 oriented to person, place, and time and obese. She appears well-developed. Cardiovascular: Normal rate. No murmur heard. Pulmonary/Chest: Effort normal. No respiratory distress. She has no wheezes. Abdominal: Soft. She exhibits no distension.  There (100 mg total) by mouth 2 (two) times daily.  -     Discontinue: TraMADol HCl 50 MG Oral Tab; Take 1 tablet (50 mg total) by mouth every 8 (eight) hours as needed for Pain (back and shoulder pain).   -     omeprazole 20 MG Oral Capsule Delayed Release; TAKE result of today.    Patient Active Problem List:     Essential hypertension     Dyslipidemia     GERD (gastroesophageal reflux disease)     H/O gastric bypass     Arthritis of knee, left     1st degree AV block     H/O spina bifida     Arthritis of knee

## 2017-11-01 ENCOUNTER — LAB ENCOUNTER (OUTPATIENT)
Dept: LAB | Age: 72
End: 2017-11-01
Attending: INTERNAL MEDICINE
Payer: MEDICARE

## 2017-11-01 ENCOUNTER — HOSPITAL ENCOUNTER (OUTPATIENT)
Dept: CT IMAGING | Age: 72
Discharge: HOME | End: 2017-11-01
Attending: INTERNAL MEDICINE
Payer: MEDICARE

## 2017-11-01 DIAGNOSIS — D64.9 NORMOCYTIC NORMOCHROMIC ANEMIA: ICD-10-CM

## 2017-11-01 DIAGNOSIS — D64.9 NORMOCYTIC ANEMIA: ICD-10-CM

## 2017-11-01 DIAGNOSIS — C34.12 MALIGNANT NEOPLASM OF UPPER LOBE OF LEFT LUNG (HCC): ICD-10-CM

## 2017-11-01 PROCEDURE — 71250 CT THORAX DX C-: CPT | Performed by: INTERNAL MEDICINE

## 2017-11-01 PROCEDURE — 83540 ASSAY OF IRON: CPT

## 2017-11-01 PROCEDURE — 36415 COLL VENOUS BLD VENIPUNCTURE: CPT

## 2017-11-01 PROCEDURE — 80053 COMPREHEN METABOLIC PANEL: CPT

## 2017-11-01 PROCEDURE — 83615 LACTATE (LD) (LDH) ENZYME: CPT

## 2017-11-01 PROCEDURE — 83550 IRON BINDING TEST: CPT

## 2017-11-01 PROCEDURE — 85025 COMPLETE CBC W/AUTO DIFF WBC: CPT

## 2017-11-07 ENCOUNTER — OFFICE VISIT (OUTPATIENT)
Dept: HEMATOLOGY/ONCOLOGY | Age: 72
End: 2017-11-07
Attending: INTERNAL MEDICINE
Payer: MEDICARE

## 2017-11-07 VITALS
TEMPERATURE: 97 F | WEIGHT: 190 LBS | OXYGEN SATURATION: 99 % | BODY MASS INDEX: 35 KG/M2 | HEART RATE: 66 BPM | SYSTOLIC BLOOD PRESSURE: 124 MMHG | DIASTOLIC BLOOD PRESSURE: 69 MMHG | RESPIRATION RATE: 20 BRPM

## 2017-11-07 DIAGNOSIS — C34.12 MALIGNANT NEOPLASM OF UPPER LOBE OF LEFT LUNG (HCC): ICD-10-CM

## 2017-11-07 DIAGNOSIS — D64.9 NORMOCYTIC ANEMIA: Primary | ICD-10-CM

## 2017-11-07 PROCEDURE — 99214 OFFICE O/P EST MOD 30 MIN: CPT | Performed by: INTERNAL MEDICINE

## 2017-11-07 NOTE — PROGRESS NOTES
Pt here for 6 month MD f/u. Pt notes having lower back pain that shoots down leg for about a month now, seeing Dr. Reena Mac for it, on Tramadol. Pt notes some sinus issues. Pt has low energy level with back pain, appetite is good. Pt has no further complaints.

## 2017-11-07 NOTE — PROGRESS NOTES
Cancer Center Progress Note    Patient Name: Nisha Mares   YOB: 1945   Medical Record Number: [id number]   CSN: [id number]   Attending Physician: Angel Luis Davis M.D.    Referring Physician: Ana Laura Argueta MD    Date of Visit: 11/7/2017     PILAR 8 (eight) hours as needed for Pain (back and shoulder pain). , Disp: 180 tablet, Rfl: 1  •  METOPROLOL SUCCINATE  MG Oral Tablet 24 Hr, TAKE ONE TABLET BY MOUTH ONCE DAILY, Disp: 90 tablet, Rfl: 3  •  LEVOTHYROXINE SODIUM 50 MCG Oral Tab, TAKE ONE TAB 9/20/2010   • Pneumonia, organism unspecified(486)    • Reflux esophagitis    • Right hip pain 12/17/2014    R hip pain   • S/P knee replacement 8/5/2013    L 2013   • S/P  shunt 1990    cervical spine shunt surgery   • Shortness of breath    • Shoulder Psychosocial History:    Social History  Social History   Marital status:   Spouse name: N/A    Years of education: N/A  Number of children: N/A     Occupational History  None on file     Social History Main Topics   Smoking status: Naomi Wild Date    11/01/2017   K 4.5 11/01/2017    11/01/2017   CO2 24.0 11/01/2017   BUN 26 (H) 11/01/2017   CREATSERUM 1.98 (H) 11/01/2017   GLU 85 11/01/2017   CA 9.0 11/01/2017   ALKPHO 106 11/01/2017   ALT 18 11/01/2017   AST 10 (L) 11/01/2017   KASSANDRA measuring 4.6 x 6.8 cm.  Correlation with history in this region is suggested.     =====  CONCLUSION:  Overall stable appearance of the chest. There is a stable partially calcified nodular lesion at the right lung base along with several calcified granuloma

## 2017-12-14 ENCOUNTER — OFFICE VISIT (OUTPATIENT)
Dept: FAMILY MEDICINE CLINIC | Facility: CLINIC | Age: 72
End: 2017-12-14

## 2017-12-14 VITALS
RESPIRATION RATE: 16 BRPM | HEIGHT: 62 IN | BODY MASS INDEX: 34.96 KG/M2 | HEART RATE: 72 BPM | TEMPERATURE: 97 F | DIASTOLIC BLOOD PRESSURE: 74 MMHG | WEIGHT: 190 LBS | SYSTOLIC BLOOD PRESSURE: 122 MMHG

## 2017-12-14 DIAGNOSIS — L29.9 PRURITUS: Primary | ICD-10-CM

## 2017-12-14 DIAGNOSIS — D64.9 NORMOCYTIC ANEMIA: ICD-10-CM

## 2017-12-14 DIAGNOSIS — N18.30 CKD (CHRONIC KIDNEY DISEASE) STAGE 3, GFR 30-59 ML/MIN (HCC): ICD-10-CM

## 2017-12-14 PROBLEM — G89.29 CHRONIC PAIN OF BOTH SHOULDERS: Status: ACTIVE | Noted: 2017-06-08

## 2017-12-14 PROBLEM — M25.512 CHRONIC PAIN OF BOTH SHOULDERS: Status: ACTIVE | Noted: 2017-06-08

## 2017-12-14 PROBLEM — M25.511 CHRONIC PAIN OF BOTH SHOULDERS: Status: ACTIVE | Noted: 2017-06-08

## 2017-12-14 PROCEDURE — 99213 OFFICE O/P EST LOW 20 MIN: CPT | Performed by: FAMILY MEDICINE

## 2017-12-14 NOTE — PATIENT INSTRUCTIONS
Please ask Dr. Jory Nichols about your itching:   Could it be related to the high kappa light chains that you had done on your labs? If not see Dr. To Villarreal (renal) to see if your kidneys could be causing this.     May do a trial off the triamterene/hydrochlorothiaz

## 2017-12-14 NOTE — PROGRESS NOTES
[facility] Visit Note  12/14/2017      Subjective:      Patient ID: Morro Arriaga is a 74year old female. Chief Complaint:  Patient presents with:  Itching: x 6 wks she's been itching all over her body.   Imm/Inj: declines the flu vaccine showering  -cont benadryl      Diff dx: plasma dyscrasia, uremia but has only CKD stage 3, dry skin, medication side effect, vs other. Return for f/u itching after seeing your oncologist & kidney doctor if needed.

## 2017-12-19 ENCOUNTER — OFFICE VISIT (OUTPATIENT)
Dept: HEMATOLOGY/ONCOLOGY | Age: 72
End: 2017-12-19
Attending: INTERNAL MEDICINE
Payer: MEDICARE

## 2017-12-19 VITALS
WEIGHT: 190 LBS | RESPIRATION RATE: 20 BRPM | HEART RATE: 58 BPM | TEMPERATURE: 98 F | SYSTOLIC BLOOD PRESSURE: 144 MMHG | DIASTOLIC BLOOD PRESSURE: 71 MMHG | BODY MASS INDEX: 35 KG/M2 | OXYGEN SATURATION: 99 %

## 2017-12-19 DIAGNOSIS — D64.9 NORMOCYTIC ANEMIA: ICD-10-CM

## 2017-12-19 DIAGNOSIS — L29.9 ITCHING: Primary | ICD-10-CM

## 2017-12-19 DIAGNOSIS — C34.12 MALIGNANT NEOPLASM OF UPPER LOBE OF LEFT LUNG (HCC): ICD-10-CM

## 2017-12-19 PROCEDURE — 99214 OFFICE O/P EST MOD 30 MIN: CPT | Performed by: INTERNAL MEDICINE

## 2017-12-19 RX ORDER — HYDROXYZINE HYDROCHLORIDE 25 MG/1
TABLET, FILM COATED ORAL EVERY 6 HOURS PRN
Qty: 60 TABLET | Refills: 1 | Status: SHIPPED | OUTPATIENT
Start: 2017-12-19 | End: 2018-01-30 | Stop reason: ALTCHOICE

## 2017-12-19 NOTE — PROGRESS NOTES
Cancer Center Progress Note    Patient Name: Demetra Goodpasture   YOB: 1945   Medical Record Number: [id number]   CSN: [id number]   Attending Physician: Chelsea Prajapati M.D.    Referring Physician: MD Elmer Gregorio MD    Date of tablet, Rfl: 1  •  METOPROLOL SUCCINATE  MG Oral Tablet 24 Hr, TAKE ONE TABLET BY MOUTH ONCE DAILY, Disp: 90 tablet, Rfl: 3  •  LEVOTHYROXINE SODIUM 50 MCG Oral Tab, TAKE ONE TABLET BY MOUTH ONCE DAILY, Disp: 90 tablet, Rfl: 1  •  Triamterene-HCTZ 37 • Right hip pain 12/17/2014    R hip pain   • S/P knee replacement 8/5/2013    L 2013   • S/P  shunt 1990    cervical spine shunt surgery   • Shortness of breath    • Shoulder joint dysfunction 2004    R fracture after fall, chronically damaged   • Sp   Spouse name: N/A    Years of education: N/A  Number of children: N/A     Occupational History  None on file     Social History Main Topics   Smoking status: Former Smoker     Quit date: 3/16/1987    Smokeless tobacco: Never Used    Alcohol use No 11/01/2017   MPV 10.9 02/11/2013       Lab Results  Component Value Date    12/19/2017   K 4.7 12/19/2017    12/19/2017   CO2 23.0 12/19/2017   BUN 22 (H) 12/19/2017   CREATSERUM 2.17 (H) 12/19/2017   GLU 87 12/19/2017   CA 8.4 12/19/2017   ALK appearance with probable areas of erosion and expansile lytic region measuring 4.6 x 6.8 cm.  Correlation with history in this region is suggested.     =====  CONCLUSION:  Overall stable appearance of the chest. There is a stable partially calcified nodular will call her with test results and recommendations as indicated. Otherwise she will keep her previously scheduled appointment. Emotional Well Being:  I have assessed the patient's emotional well-being and any concerns about anxiety or depression.   We

## 2017-12-19 NOTE — PROGRESS NOTES
Pt here for 3 week MD f/u. Pt has had all over body itching for a couple months now, but has gotten much worse in the last 2 weeks. Pt's PCP recommended she see Dr. Katelynn Martin and also Dr. Aleksandr Randall.      Education Record    Learner:  Patient    Disease / Diagnosis

## 2017-12-20 DIAGNOSIS — L29.9 ITCHING: ICD-10-CM

## 2017-12-20 RX ORDER — HYDROXYZINE HYDROCHLORIDE 25 MG/1
TABLET, FILM COATED ORAL EVERY 6 HOURS PRN
Qty: 60 TABLET | Refills: 1
Start: 2017-12-20

## 2017-12-20 NOTE — TELEPHONE ENCOUNTER
From: Roni Hernandez  Sent: 12/20/2017 6:56 AM CST  Subject: Medication Renewal Request    Roni Hernandez would like a refill of the following medications:     HydrOXYzine HCl 25 MG Oral Tab Kerri Christina MD]   Patient Comment: HOW DO I PRINT [address]

## 2017-12-22 ENCOUNTER — TELEPHONE (OUTPATIENT)
Dept: HEMATOLOGY/ONCOLOGY | Facility: HOSPITAL | Age: 72
End: 2017-12-22

## 2017-12-22 NOTE — TELEPHONE ENCOUNTER
Called to go over lab results. Not available and left message. Iron levels low and would recommend IV iron. Tryptase mildly high but could be related to CKD and for now will plan or rechecking this is a few months.  Pending how her Hgb improves, repeat tryp

## 2017-12-26 PROBLEM — D50.9 IRON DEFICIENCY ANEMIA: Status: ACTIVE | Noted: 2017-12-26

## 2017-12-26 RX ORDER — DIPHENHYDRAMINE HYDROCHLORIDE 50 MG/ML
50 INJECTION INTRAMUSCULAR; INTRAVENOUS ONCE
Status: CANCELLED | OUTPATIENT
Start: 2017-12-26

## 2017-12-26 NOTE — TELEPHONE ENCOUNTER
Called her with lab results. Iron levels low again and discussed IV iron which we will arrange at our [place] office. Tryptase levels can be high in CKD. Will repeat- if persistently elevated may need bone marrow biopsy.  Also recommended she see Derm fo

## 2018-01-02 ENCOUNTER — OFFICE VISIT (OUTPATIENT)
Dept: HEMATOLOGY/ONCOLOGY | Age: 73
End: 2018-01-02
Attending: INTERNAL MEDICINE
Payer: MEDICARE

## 2018-01-02 VITALS
HEART RATE: 77 BPM | DIASTOLIC BLOOD PRESSURE: 81 MMHG | SYSTOLIC BLOOD PRESSURE: 132 MMHG | OXYGEN SATURATION: 98 % | TEMPERATURE: 98 F | RESPIRATION RATE: 20 BRPM

## 2018-01-02 DIAGNOSIS — D50.8 OTHER IRON DEFICIENCY ANEMIA: Primary | ICD-10-CM

## 2018-01-02 DIAGNOSIS — Z98.84 H/O GASTRIC BYPASS: ICD-10-CM

## 2018-01-02 PROCEDURE — 96374 THER/PROPH/DIAG INJ IV PUSH: CPT

## 2018-01-02 RX ORDER — DIPHENHYDRAMINE HYDROCHLORIDE 50 MG/ML
50 INJECTION INTRAMUSCULAR; INTRAVENOUS ONCE
Status: CANCELLED | OUTPATIENT
Start: 2018-01-02

## 2018-01-02 NOTE — PROGRESS NOTES
Education Record    Learner:  Patient    Disease / Ivan De La Torre    Barriers / Limitations:  None    Method:  Brief focused, printed material and  reinforcement    General Topics:  Plan of care reviewed    Outcome:  Shows understanding

## 2018-01-04 RX ORDER — TRAMADOL HYDROCHLORIDE 50 MG/1
TABLET ORAL
Qty: 90 TABLET | Refills: 1 | OUTPATIENT
Start: 2018-01-04

## 2018-01-04 NOTE — TELEPHONE ENCOUNTER
Requesting Tramadol  LOV: 12/14/17 (acute); 10/31/17  RTC: prn  Last Relevant Labs: n/a  Filled: 10/31/17 #180 with 1 refills    Future Appointments  Date Time Provider Nicolas Prakash   1/9/2018 1:00 PM PF TX RN1 PF CHEMO [facility] [place]   5/8/2018 10:00 A

## 2018-01-05 ENCOUNTER — TELEPHONE (OUTPATIENT)
Dept: HEMATOLOGY/ONCOLOGY | Facility: HOSPITAL | Age: 73
End: 2018-01-05

## 2018-01-05 NOTE — TELEPHONE ENCOUNTER
Please ask pt how she is taking the tramadol. Previous scripts for tramadol were for 1# BID, #90 and then the last script was for 2# q 8hr, #180.  [place] it, but I want to be sure which I am mishel

## 2018-01-05 NOTE — TELEPHONE ENCOUNTER
It does not appear patient has filled both scripts from 10/31/17. Contacted pharmacy and confirm last fill was 10/20/17 for #90 and patient never filled prescription from 10/31/17. Contacted patient to discuss further.  Patient states he states she has

## 2018-01-05 NOTE — TELEPHONE ENCOUNTER
Pt sts she is going to see Dr Sean Gaines and not Dr Sara Whittaker. Pt sts when she was in to see Dr Sean Gaines on 12/14/17 she was told not to come back until she was seen by her other speciality drs.   Pt sts she does not have an apt with her specialist until mid [address]

## 2018-01-08 RX ORDER — TRAMADOL HYDROCHLORIDE 50 MG/1
50 TABLET ORAL DAILY
Qty: 90 TABLET | Refills: 1 | Status: SHIPPED
Start: 2018-01-08 | End: 2018-03-26

## 2018-01-08 NOTE — TELEPHONE ENCOUNTER
Per pt, takes 1 Tramadol every night to alleviate pain in order to sleep. Verified with patient that she takes 1 tablet once a day. #90 filled to [facility] in [place].

## 2018-01-09 ENCOUNTER — OFFICE VISIT (OUTPATIENT)
Dept: HEMATOLOGY/ONCOLOGY | Age: 73
End: 2018-01-09
Attending: INTERNAL MEDICINE
Payer: MEDICARE

## 2018-01-09 VITALS
RESPIRATION RATE: 20 BRPM | HEART RATE: 84 BPM | DIASTOLIC BLOOD PRESSURE: 60 MMHG | OXYGEN SATURATION: 100 % | TEMPERATURE: 98 F | SYSTOLIC BLOOD PRESSURE: 90 MMHG

## 2018-01-09 DIAGNOSIS — Z98.84 H/O GASTRIC BYPASS: ICD-10-CM

## 2018-01-09 DIAGNOSIS — D64.9 NORMOCYTIC NORMOCHROMIC ANEMIA: Primary | ICD-10-CM

## 2018-01-09 DIAGNOSIS — D50.8 OTHER IRON DEFICIENCY ANEMIA: Primary | ICD-10-CM

## 2018-01-09 PROCEDURE — 96374 THER/PROPH/DIAG INJ IV PUSH: CPT

## 2018-01-09 RX ORDER — DIPHENHYDRAMINE HYDROCHLORIDE 50 MG/ML
50 INJECTION INTRAMUSCULAR; INTRAVENOUS ONCE
Status: CANCELLED | OUTPATIENT
Start: 2018-01-09

## 2018-01-09 NOTE — PROGRESS NOTES
Education Record    Learner:  Patient    Disease / Jose David Jalloh    Barriers / Limitations:  None    Method:  Brief focused, printed material and  reinforcement    General Topics:  Plan of care reviewed    Outcome:  Shows understanding

## 2018-01-09 NOTE — TELEPHONE ENCOUNTER
Rx approved by Dr. Nallely Robbins and signed. Faxed today to [facility] and confirmed.     Time: one  min

## 2018-01-18 ENCOUNTER — TELEPHONE (OUTPATIENT)
Dept: HEMATOLOGY/ONCOLOGY | Facility: HOSPITAL | Age: 73
End: 2018-01-18

## 2018-01-30 ENCOUNTER — OFFICE VISIT (OUTPATIENT)
Dept: FAMILY MEDICINE CLINIC | Facility: CLINIC | Age: 73
End: 2018-01-30

## 2018-01-30 VITALS
DIASTOLIC BLOOD PRESSURE: 70 MMHG | BODY MASS INDEX: 34.96 KG/M2 | TEMPERATURE: 100 F | RESPIRATION RATE: 18 BRPM | HEART RATE: 60 BPM | WEIGHT: 190 LBS | SYSTOLIC BLOOD PRESSURE: 120 MMHG | HEIGHT: 62 IN

## 2018-01-30 DIAGNOSIS — L29.9 PRURITUS: Primary | ICD-10-CM

## 2018-01-30 DIAGNOSIS — N18.30 CKD (CHRONIC KIDNEY DISEASE) STAGE 3, GFR 30-59 ML/MIN (HCC): ICD-10-CM

## 2018-01-30 DIAGNOSIS — E78.5 DYSLIPIDEMIA: ICD-10-CM

## 2018-01-30 DIAGNOSIS — K52.9 CHRONIC DIARRHEA: ICD-10-CM

## 2018-01-30 PROCEDURE — 99214 OFFICE O/P EST MOD 30 MIN: CPT | Performed by: FAMILY MEDICINE

## 2018-01-30 RX ORDER — TRIAMTERENE AND HYDROCHLOROTHIAZIDE 37.5; 25 MG/1; MG/1
2 CAPSULE ORAL EVERY MORNING
Qty: 180 CAPSULE | Refills: 1 | Status: SHIPPED | OUTPATIENT
Start: 2018-01-30 | End: 2018-02-27

## 2018-01-30 RX ORDER — CHOLESTYRAMINE 4 G/9G
4 POWDER, FOR SUSPENSION ORAL DAILY
Qty: 90 EACH | Refills: 0 | Status: SHIPPED | OUTPATIENT
Start: 2018-01-30 | End: 2018-02-27

## 2018-01-30 NOTE — PROGRESS NOTES
HPI:    Patient ID: Yovanny Fuller is a 74year old female. HPI  Ms. Sabiha Bruner is a pleasant 75 y/o F with history of hypertension, neuropathy involving her neck and right extremity, GERD, status post gastric bypass, hypothyroidism, chronic kidney disease s MG Oral Capsule Delayed Release TAKE ONE CAPSULE BY MOUTH ONCE DAILY Disp: 90 capsule Rfl: 3   METOPROLOL SUCCINATE  MG Oral Tablet 24 Hr TAKE ONE TABLET BY MOUTH ONCE DAILY Disp: 90 tablet Rfl: 3   LEVOTHYROXINE SODIUM 50 MCG Oral Tab TAKE ONE TABLE panel; his cholestyramine with help in this regard too  3. Chronic kidney disease stage III-we will await lab tests that were ordered  4.   Chronic diarrhea-likely functional; will see if cholestyramine would help in this matter also    Follow-up in 4 week

## 2018-01-30 NOTE — PATIENT INSTRUCTIONS
Thank you for choosing Mee Ibrahim MD at [place] [address]  To Do: Roni Hernandez  1. Please take meds as directed  Effective 6/19/17 until November 2017  Due to [employer] is being moved.   It is inside the [place] [address] your duty and for your safety to discuss with the pharmacist and our office with questions, and to notify us and stop treatment if problems arise, but know that our intention is that the benefits outweigh those potential risks and we strive to make you hea

## 2018-01-31 RX ORDER — LEVOTHYROXINE SODIUM 0.05 MG/1
TABLET ORAL
Qty: 90 TABLET | Refills: 1 | Status: SHIPPED | OUTPATIENT
Start: 2018-01-31 | End: 2018-08-07

## 2018-01-31 NOTE — TELEPHONE ENCOUNTER
Thyroid Supplements Protocol Passed1/31 1:53 PM   TSH test in past 12 months    TSH value between 0.350 and 5.500 IU/ml    Appointment in past 12 or next 3 months     Requesting levothyroxine 50mcg  LOV: 1/30/18  RTC: 1 month  Last Relevant Labs: 6/8/17  F

## 2018-02-06 ENCOUNTER — NURSE ONLY (OUTPATIENT)
Dept: HEMATOLOGY/ONCOLOGY | Age: 73
End: 2018-02-06
Attending: INTERNAL MEDICINE
Payer: MEDICARE

## 2018-02-06 DIAGNOSIS — D64.9 NORMOCYTIC NORMOCHROMIC ANEMIA: ICD-10-CM

## 2018-02-06 LAB
BASOPHILS # BLD AUTO: 0.04 X10(3) UL (ref 0–0.1)
BASOPHILS NFR BLD AUTO: 0.9 %
DEPRECATED HBV CORE AB SER IA-ACNC: 88.8 NG/ML (ref 10–291)
EOSINOPHIL # BLD AUTO: 0.28 X10(3) UL (ref 0–0.3)
EOSINOPHIL NFR BLD AUTO: 6.1 %
ERYTHROCYTE [DISTWIDTH] IN BLOOD BY AUTOMATED COUNT: 12.8 % (ref 11.5–16)
HCT VFR BLD AUTO: 32.3 % (ref 34–50)
HGB BLD-MCNC: 10 G/DL (ref 12–16)
IMMATURE GRANULOCYTE COUNT: 0.03 X10(3) UL (ref 0–1)
IMMATURE GRANULOCYTE RATIO %: 0.6 %
IRON SATURATION: 21 % (ref 13–45)
IRON: 82 UG/DL (ref 28–170)
LYMPHOCYTES # BLD AUTO: 1.15 X10(3) UL (ref 0.9–4)
LYMPHOCYTES NFR BLD AUTO: 24.9 %
MCH RBC QN AUTO: 27.5 PG (ref 27–33.2)
MCHC RBC AUTO-ENTMCNC: 31 G/DL (ref 31–37)
MCV RBC AUTO: 88.7 FL (ref 81–100)
MONOCYTES # BLD AUTO: 0.54 X10(3) UL (ref 0.1–0.6)
MONOCYTES NFR BLD AUTO: 11.7 %
NEUTROPHIL ABS PRELIM: 2.58 X10 (3) UL (ref 1.3–6.7)
NEUTROPHILS # BLD AUTO: 2.58 X10(3) UL (ref 1.3–6.7)
NEUTROPHILS NFR BLD AUTO: 55.8 %
PLATELET # BLD AUTO: 293 10(3)UL (ref 150–450)
RBC # BLD AUTO: 3.64 X10(6)UL (ref 3.8–5.1)
RED CELL DISTRIBUTION WIDTH-SD: 41.3 FL (ref 35.1–46.3)
TOTAL IRON BINDING CAPACITY: 387 UG/DL (ref 298–536)
TRANSFERRIN: 260 MG/DL (ref 200–360)
WBC # BLD AUTO: 4.6 X10(3) UL (ref 4–13)

## 2018-02-06 PROCEDURE — 83520 IMMUNOASSAY QUANT NOS NONAB: CPT

## 2018-02-06 PROCEDURE — 36415 COLL VENOUS BLD VENIPUNCTURE: CPT

## 2018-02-06 PROCEDURE — 82728 ASSAY OF FERRITIN: CPT

## 2018-02-06 PROCEDURE — 83540 ASSAY OF IRON: CPT

## 2018-02-06 PROCEDURE — 85025 COMPLETE CBC W/AUTO DIFF WBC: CPT

## 2018-02-06 PROCEDURE — 83550 IRON BINDING TEST: CPT

## 2018-02-08 LAB — TRYPTASE: 13.9 UG/L

## 2018-02-12 ENCOUNTER — APPOINTMENT (OUTPATIENT)
Dept: LAB | Age: 73
End: 2018-02-12
Attending: FAMILY MEDICINE
Payer: MEDICARE

## 2018-02-12 DIAGNOSIS — E78.5 DYSLIPIDEMIA: ICD-10-CM

## 2018-02-12 DIAGNOSIS — N18.30 CKD (CHRONIC KIDNEY DISEASE) STAGE 3, GFR 30-59 ML/MIN (HCC): ICD-10-CM

## 2018-02-12 LAB
ALBUMIN SERPL-MCNC: 3.6 G/DL (ref 3.5–4.8)
ALP LIVER SERPL-CCNC: 104 U/L (ref 55–142)
ALT SERPL-CCNC: 12 U/L (ref 14–54)
AST SERPL-CCNC: 13 U/L (ref 15–41)
BILIRUB SERPL-MCNC: 0.4 MG/DL (ref 0.1–2)
BUN BLD-MCNC: 31 MG/DL (ref 8–20)
CALCIUM BLD-MCNC: 8.7 MG/DL (ref 8.3–10.3)
CHLORIDE: 106 MMOL/L (ref 101–111)
CHOLEST SMN-MCNC: 236 MG/DL (ref ?–200)
CO2: 21 MMOL/L (ref 22–32)
CREAT BLD-MCNC: 1.96 MG/DL (ref 0.55–1.02)
GLUCOSE BLD-MCNC: 103 MG/DL (ref 70–99)
HDLC SERPL-MCNC: 64 MG/DL (ref 45–?)
HDLC SERPL: 3.69 {RATIO} (ref ?–4.44)
LDLC SERPL CALC-MCNC: 127 MG/DL (ref ?–130)
M PROTEIN MFR SERPL ELPH: 7 G/DL (ref 6.1–8.3)
NONHDLC SERPL-MCNC: 172 MG/DL (ref ?–130)
PHOSPHATE SERPL-MCNC: 4.7 MG/DL (ref 2.5–4.9)
POTASSIUM SERPL-SCNC: 4.9 MMOL/L (ref 3.6–5.1)
PTH-INTACT SERPL-MCNC: 114.3 PG/ML (ref 11.1–79.5)
SODIUM SERPL-SCNC: 136 MMOL/L (ref 136–144)
TRIGL SERPL-MCNC: 223 MG/DL (ref ?–150)
VLDLC SERPL CALC-MCNC: 45 MG/DL (ref 5–40)

## 2018-02-12 PROCEDURE — 36415 COLL VENOUS BLD VENIPUNCTURE: CPT

## 2018-02-12 PROCEDURE — 80053 COMPREHEN METABOLIC PANEL: CPT

## 2018-02-12 PROCEDURE — 84100 ASSAY OF PHOSPHORUS: CPT

## 2018-02-12 PROCEDURE — 80061 LIPID PANEL: CPT

## 2018-02-12 PROCEDURE — 83970 ASSAY OF PARATHORMONE: CPT

## 2018-02-16 ENCOUNTER — TELEPHONE (OUTPATIENT)
Dept: FAMILY MEDICINE CLINIC | Facility: CLINIC | Age: 73
End: 2018-02-16

## 2018-02-16 NOTE — TELEPHONE ENCOUNTER
Patient is requesting her lab results to be faxed to Dr Joyce Sadler, in order for her to get an appt. PT states MD will not schedule an appt until they have reviewed her labs. Labs faxed to [phone] Dr Shanda Castorena, confirmation received.  Pt aware that la

## 2018-02-20 ENCOUNTER — OFFICE VISIT (OUTPATIENT)
Dept: NEPHROLOGY | Facility: CLINIC | Age: 73
End: 2018-02-20

## 2018-02-20 VITALS — DIASTOLIC BLOOD PRESSURE: 70 MMHG | WEIGHT: 190 LBS | SYSTOLIC BLOOD PRESSURE: 124 MMHG | BODY MASS INDEX: 35 KG/M2

## 2018-02-20 DIAGNOSIS — N18.30 CKD (CHRONIC KIDNEY DISEASE) STAGE 3, GFR 30-59 ML/MIN (HCC): ICD-10-CM

## 2018-02-20 DIAGNOSIS — L29.9 ITCHING: ICD-10-CM

## 2018-02-20 DIAGNOSIS — E21.3 HYPERPARATHYROIDISM (HCC): Primary | ICD-10-CM

## 2018-02-20 PROCEDURE — 99214 OFFICE O/P EST MOD 30 MIN: CPT | Performed by: INTERNAL MEDICINE

## 2018-02-20 NOTE — PROGRESS NOTES
Nephrology Progress Note      ASSESSMENT/PLAN:        1) Elevated PTH- agree this represents secondary hyperparathyroidism due to chronic kidney disease and probable low vitamin D levels especially given her low normal serum calcium levels which excludes p gross hematuria  Denies LE edema  Denies skin rashes/myalgias/arthralgias    PMH:  Past Medical History:   Diagnosis Date   • Adenocarcinoma, lung (HCC) 10.2014    sp JILL wedge resect   • Anxiety state, unspecified    • Arrhythmia    • Back pain, acute 11/ APPENDECTOMY  1990: BRAIN SURGERY      Comment: cervical shunt  2016: COLONOSCOPY  8/2012: GASTRIC BYPASS,OBESITY,SB RECONSTRUC      Comment: multiple complications revisions 3 revisions,                and JO ANN  2013:  WOUND REPAIR SUTURING SUPPLY      C (FLINTSTONES GUMMIES) Oral Chew Tab Chew 1 Tab by mouth daily.  Disp:  Rfl:        Allergies:    Lyrica [Pregabalin]     Swelling    Comment:Caps  Mevacor [Lovastatin]    Swelling    Comment:Tabs  Neurontin [Gabapent*    Swelling    Comment:Tabs;  Radiology

## 2018-02-27 ENCOUNTER — OFFICE VISIT (OUTPATIENT)
Dept: FAMILY MEDICINE CLINIC | Facility: CLINIC | Age: 73
End: 2018-02-27

## 2018-02-27 VITALS
DIASTOLIC BLOOD PRESSURE: 70 MMHG | BODY MASS INDEX: 34.96 KG/M2 | HEIGHT: 62 IN | WEIGHT: 190 LBS | HEART RATE: 70 BPM | RESPIRATION RATE: 16 BRPM | SYSTOLIC BLOOD PRESSURE: 120 MMHG | TEMPERATURE: 98 F

## 2018-02-27 DIAGNOSIS — L29.9 PRURITUS: Primary | ICD-10-CM

## 2018-02-27 DIAGNOSIS — R19.7 DIARRHEA, UNSPECIFIED TYPE: ICD-10-CM

## 2018-02-27 DIAGNOSIS — H53.9 VISUAL DISTURBANCE: ICD-10-CM

## 2018-02-27 PROCEDURE — 99214 OFFICE O/P EST MOD 30 MIN: CPT | Performed by: FAMILY MEDICINE

## 2018-02-27 RX ORDER — DOXEPIN HYDROCHLORIDE 10 MG/1
10 CAPSULE ORAL NIGHTLY
Qty: 90 CAPSULE | Refills: 1 | Status: SHIPPED | OUTPATIENT
Start: 2018-02-27 | End: 2018-03-12

## 2018-02-27 RX ORDER — DIPHENOXYLATE HYDROCHLORIDE AND ATROPINE SULFATE 2.5; .025 MG/1; MG/1
1 TABLET ORAL 3 TIMES DAILY PRN
Qty: 90 TABLET | Refills: 1 | Status: SHIPPED | OUTPATIENT
Start: 2018-02-27 | End: 2018-03-26 | Stop reason: ALTCHOICE

## 2018-02-27 NOTE — PROGRESS NOTES
HPI:    Patient ID: Monica Soriano is a 74year old female. HPI  Ms. Efe Zuniga is a pleasant 15-year-old female with history of hypertension, chronic kidney disease stage III, hypothyroidism, GERD, Arnold-Chiari malformation here today to follow-up for prur omeprazole 20 MG Oral Capsule Delayed Release TAKE ONE CAPSULE BY MOUTH ONCE DAILY Disp: 90 capsule Rfl: 3   METOPROLOL SUCCINATE  MG Oral Tablet 24 Hr TAKE ONE TABLET BY MOUTH ONCE DAILY Disp: 90 tablet Rfl: 3   Pediatric Multivit-Minerals-C [facility] Sig: Take 1 capsule (10 mg total) by mouth nightly. diphenoxylate-atropine 2.5-0.025 MG Oral Tab 90 tablet 1      Sig: Take 1 tablet by mouth 3 (three) times daily as needed for Diarrhea.            Imaging & Referrals:  OPHTHALMOLOGY - INTERNAL

## 2018-02-27 NOTE — PATIENT INSTRUCTIONS
Thank you for choosing Matty Manuel MD at [place] [address]  To Do: Dipika Wu  1. Please take meds as directed  [facility] is located in [address]. Monday, Tuesday & Friday – 8 a.m. to 4 p.m. Wednesday, Thursday – 7 a.m. to 3 p.m.   Trent Neri those potential risks and we strive to make you healthier and to improve your quality of life.     Referrals, and Radiology Information:    If your insurance requires a referral to a specialist, please allow 5 business days to process your referral request.

## 2018-02-28 ENCOUNTER — TELEPHONE (OUTPATIENT)
Dept: FAMILY MEDICINE CLINIC | Facility: CLINIC | Age: 73
End: 2018-02-28

## 2018-02-28 NOTE — TELEPHONE ENCOUNTER
PA has been submitted through [facility] for doxepin.   Waiting on denial/approval

## 2018-03-05 ENCOUNTER — TELEPHONE (OUTPATIENT)
Dept: FAMILY MEDICINE CLINIC | Facility: CLINIC | Age: 73
End: 2018-03-05

## 2018-03-05 NOTE — TELEPHONE ENCOUNTER
Pharmacy called to give drug interaction warning that there is an increased seizure risk with Doxepin and Tramadol. Okay for patient to proceed with both medications?

## 2018-03-05 NOTE — TELEPHONE ENCOUNTER
Pharmacy calling in regards to the prescription sent over for Doxepin. Physician approval is needed as there is an increased seizure risk with the Doxepin and Tramadol.

## 2018-03-12 PROBLEM — K90.9 DIARRHEA DUE TO MALABSORPTION: Status: ACTIVE | Noted: 2018-03-12

## 2018-03-12 PROBLEM — R19.7 DIARRHEA DUE TO MALABSORPTION: Status: ACTIVE | Noted: 2018-03-12

## 2018-03-12 PROBLEM — M79.89 LEG SWELLING: Status: ACTIVE | Noted: 2018-03-12

## 2018-03-15 ENCOUNTER — HOSPITAL ENCOUNTER (OUTPATIENT)
Dept: CV DIAGNOSTICS | Age: 73
Discharge: HOME | End: 2018-03-15
Attending: INTERNAL MEDICINE
Payer: MEDICARE

## 2018-03-15 DIAGNOSIS — N18.30 CKD (CHRONIC KIDNEY DISEASE) STAGE 3, GFR 30-59 ML/MIN (HCC): ICD-10-CM

## 2018-03-15 DIAGNOSIS — R01.1 HEART MURMUR: ICD-10-CM

## 2018-03-15 DIAGNOSIS — R01.1 CARDIAC MURMUR: ICD-10-CM

## 2018-03-15 DIAGNOSIS — N18.9 CKD (CHRONIC KIDNEY DISEASE): ICD-10-CM

## 2018-03-15 PROCEDURE — 93306 TTE W/DOPPLER COMPLETE: CPT | Performed by: INTERNAL MEDICINE

## 2018-03-16 PROBLEM — I35.1 NONRHEUMATIC AORTIC VALVE INSUFFICIENCY: Status: ACTIVE | Noted: 2018-03-16

## 2018-03-26 PROBLEM — I05.9 MITRAL VALVE ANNULAR CALCIFICATION: Status: ACTIVE | Noted: 2018-03-26

## 2018-03-26 PROBLEM — I50.30 DIASTOLIC DYSFUNCTION WITH HEART FAILURE (HCC): Status: ACTIVE | Noted: 2018-03-26

## 2018-03-26 PROBLEM — L29.9 ITCHING: Status: ACTIVE | Noted: 2018-03-26

## 2018-03-26 PROBLEM — I34.81 MITRAL VALVE ANNULAR CALCIFICATION: Status: ACTIVE | Noted: 2018-03-26

## 2018-03-26 PROCEDURE — 82570 ASSAY OF URINE CREATININE: CPT | Performed by: INTERNAL MEDICINE

## 2018-03-26 PROCEDURE — 81001 URINALYSIS AUTO W/SCOPE: CPT | Performed by: INTERNAL MEDICINE

## 2018-03-26 PROCEDURE — 84156 ASSAY OF PROTEIN URINE: CPT | Performed by: INTERNAL MEDICINE

## 2018-03-26 PROCEDURE — 84300 ASSAY OF URINE SODIUM: CPT | Performed by: INTERNAL MEDICINE

## 2018-03-26 PROCEDURE — 84540 ASSAY OF URINE/UREA-N: CPT | Performed by: INTERNAL MEDICINE

## 2018-04-13 ENCOUNTER — LAB ENCOUNTER (OUTPATIENT)
Dept: LAB | Age: 73
End: 2018-04-13
Attending: INTERNAL MEDICINE
Payer: MEDICARE

## 2018-04-13 DIAGNOSIS — N25.0 RENAL OSTEODYSTROPHY: ICD-10-CM

## 2018-04-13 DIAGNOSIS — N18.9 ANEMIA OF CHRONIC RENAL FAILURE: ICD-10-CM

## 2018-04-13 DIAGNOSIS — D63.1 ANEMIA OF CHRONIC RENAL FAILURE: ICD-10-CM

## 2018-04-13 DIAGNOSIS — N18.4 CHRONIC KIDNEY DISEASE, STAGE IV (SEVERE) (HCC): Primary | ICD-10-CM

## 2018-04-13 DIAGNOSIS — E55.9 AVITAMINOSIS D: ICD-10-CM

## 2018-04-13 PROCEDURE — 86160 COMPLEMENT ANTIGEN: CPT

## 2018-04-13 PROCEDURE — 84100 ASSAY OF PHOSPHORUS: CPT

## 2018-04-13 PROCEDURE — 83970 ASSAY OF PARATHORMONE: CPT

## 2018-04-13 PROCEDURE — 83876 ASSAY MYELOPEROXIDASE: CPT

## 2018-04-13 PROCEDURE — 86060 ANTISTREPTOLYSIN O TITER: CPT

## 2018-04-13 PROCEDURE — 82570 ASSAY OF URINE CREATININE: CPT

## 2018-04-13 PROCEDURE — 82728 ASSAY OF FERRITIN: CPT

## 2018-04-13 PROCEDURE — 86255 FLUORESCENT ANTIBODY SCREEN: CPT

## 2018-04-13 PROCEDURE — 83540 ASSAY OF IRON: CPT

## 2018-04-13 PROCEDURE — 81003 URINALYSIS AUTO W/O SCOPE: CPT

## 2018-04-13 PROCEDURE — 86431 RHEUMATOID FACTOR QUANT: CPT

## 2018-04-13 PROCEDURE — 85025 COMPLETE CBC W/AUTO DIFF WBC: CPT

## 2018-04-13 PROCEDURE — 84550 ASSAY OF BLOOD/URIC ACID: CPT

## 2018-04-13 PROCEDURE — 83735 ASSAY OF MAGNESIUM: CPT

## 2018-04-13 PROCEDURE — 83550 IRON BINDING TEST: CPT

## 2018-04-13 PROCEDURE — 80048 BASIC METABOLIC PNL TOTAL CA: CPT

## 2018-04-13 PROCEDURE — 82306 VITAMIN D 25 HYDROXY: CPT

## 2018-04-13 PROCEDURE — 36415 COLL VENOUS BLD VENIPUNCTURE: CPT

## 2018-04-13 PROCEDURE — 82043 UR ALBUMIN QUANTITATIVE: CPT

## 2018-04-13 PROCEDURE — 83516 IMMUNOASSAY NONANTIBODY: CPT

## 2018-04-13 PROCEDURE — 85652 RBC SED RATE AUTOMATED: CPT

## 2018-04-23 PROBLEM — E55.9 VITAMIN D DEFICIENCY: Status: ACTIVE | Noted: 2018-04-23

## 2018-04-23 PROBLEM — E79.0 HYPERURICEMIA: Status: ACTIVE | Noted: 2018-04-23

## 2018-05-07 ENCOUNTER — NURSE ONLY (OUTPATIENT)
Dept: HEMATOLOGY/ONCOLOGY | Age: 73
End: 2018-05-07
Attending: INTERNAL MEDICINE
Payer: MEDICARE

## 2018-05-07 DIAGNOSIS — D64.9 NORMOCYTIC ANEMIA: ICD-10-CM

## 2018-05-07 DIAGNOSIS — C34.12 MALIGNANT NEOPLASM OF UPPER LOBE OF LEFT LUNG (HCC): ICD-10-CM

## 2018-05-07 PROCEDURE — 36415 COLL VENOUS BLD VENIPUNCTURE: CPT

## 2018-05-07 PROCEDURE — 80053 COMPREHEN METABOLIC PANEL: CPT

## 2018-05-07 PROCEDURE — 83615 LACTATE (LD) (LDH) ENZYME: CPT

## 2018-05-07 PROCEDURE — 85025 COMPLETE CBC W/AUTO DIFF WBC: CPT

## 2018-05-08 ENCOUNTER — APPOINTMENT (OUTPATIENT)
Dept: HEMATOLOGY/ONCOLOGY | Age: 73
End: 2018-05-08
Attending: INTERNAL MEDICINE
Payer: MEDICARE

## 2018-06-12 DIAGNOSIS — C34.91 BRONCHOGENIC LUNG CANCER, RIGHT (HCC): Primary | ICD-10-CM

## 2018-07-19 ENCOUNTER — LAB ENCOUNTER (OUTPATIENT)
Dept: LAB | Age: 73
End: 2018-07-19
Attending: INTERNAL MEDICINE
Payer: MEDICARE

## 2018-07-19 DIAGNOSIS — N18.4 CHRONIC KIDNEY DISEASE, STAGE IV (SEVERE) (HCC): ICD-10-CM

## 2018-07-19 DIAGNOSIS — N25.0 RENAL OSTEODYSTROPHY: ICD-10-CM

## 2018-07-19 DIAGNOSIS — N18.9 ANEMIA OF CHRONIC RENAL FAILURE: Primary | ICD-10-CM

## 2018-07-19 DIAGNOSIS — E55.9 AVITAMINOSIS D: ICD-10-CM

## 2018-07-19 DIAGNOSIS — D63.1 ANEMIA OF CHRONIC RENAL FAILURE: Primary | ICD-10-CM

## 2018-07-19 LAB
ANION GAP SERPL CALC-SCNC: 8 MMOL/L (ref 0–18)
BASOPHILS # BLD AUTO: 0.06 X10(3) UL (ref 0–0.1)
BASOPHILS NFR BLD AUTO: 0.9 %
BILIRUB UR QL STRIP.AUTO: NEGATIVE
BUN BLD-MCNC: 30 MG/DL (ref 8–20)
BUN/CREAT SERPL: 18.4 (ref 10–20)
CALCIUM BLD-MCNC: 9.3 MG/DL (ref 8.3–10.3)
CHLORIDE SERPL-SCNC: 101 MMOL/L (ref 101–111)
CLARITY UR REFRACT.AUTO: CLEAR
CO2 SERPL-SCNC: 27 MMOL/L (ref 22–32)
COLOR UR AUTO: COLORLESS
CREAT BLD-MCNC: 1.63 MG/DL (ref 0.55–1.02)
EOSINOPHIL # BLD AUTO: 0.44 X10(3) UL (ref 0–0.3)
EOSINOPHIL NFR BLD AUTO: 6.7 %
ERYTHROCYTE [DISTWIDTH] IN BLOOD BY AUTOMATED COUNT: 13.4 % (ref 11.5–16)
GLUCOSE BLD-MCNC: 86 MG/DL (ref 70–99)
GLUCOSE UR STRIP.AUTO-MCNC: NEGATIVE MG/DL
HAV IGM SER QL: 2.1 MG/DL (ref 1.8–2.5)
HCT VFR BLD AUTO: 31.9 % (ref 34–50)
HGB BLD-MCNC: 10.2 G/DL (ref 12–16)
IMMATURE GRANULOCYTE COUNT: 0.01 X10(3) UL (ref 0–1)
IMMATURE GRANULOCYTE RATIO %: 0.2 %
KETONES UR STRIP.AUTO-MCNC: NEGATIVE MG/DL
LEUKOCYTE ESTERASE UR QL STRIP.AUTO: NEGATIVE
LYMPHOCYTES # BLD AUTO: 1.51 X10(3) UL (ref 0.9–4)
LYMPHOCYTES NFR BLD AUTO: 22.9 %
MCH RBC QN AUTO: 28.1 PG (ref 27–33.2)
MCHC RBC AUTO-ENTMCNC: 32 G/DL (ref 31–37)
MCV RBC AUTO: 87.9 FL (ref 81–100)
MONOCYTES # BLD AUTO: 0.66 X10(3) UL (ref 0.1–1)
MONOCYTES NFR BLD AUTO: 10 %
NEUTROPHIL ABS PRELIM: 3.92 X10 (3) UL (ref 1.3–6.7)
NEUTROPHILS # BLD AUTO: 3.92 X10(3) UL (ref 1.3–6.7)
NEUTROPHILS NFR BLD AUTO: 59.3 %
NITRITE UR QL STRIP.AUTO: NEGATIVE
OSMOLALITY SERPL CALC.SUM OF ELEC: 287 MOSM/KG (ref 275–295)
PH UR STRIP.AUTO: 5 [PH] (ref 4.5–8)
PHOSPHATE SERPL-MCNC: 4.5 MG/DL (ref 2.5–4.9)
PLATELET # BLD AUTO: 377 10(3)UL (ref 150–450)
POTASSIUM SERPL-SCNC: 4.3 MMOL/L (ref 3.6–5.1)
PROT UR STRIP.AUTO-MCNC: NEGATIVE MG/DL
PTH-INTACT SERPL-MCNC: 165.8 PG/ML (ref 11.1–79.5)
RBC # BLD AUTO: 3.63 X10(6)UL (ref 3.8–5.1)
RBC UR QL AUTO: NEGATIVE
RED CELL DISTRIBUTION WIDTH-SD: 43.5 FL (ref 35.1–46.3)
SODIUM SERPL-SCNC: 136 MMOL/L (ref 136–144)
SP GR UR STRIP.AUTO: <1.005 (ref 1–1.03)
UROBILINOGEN UR STRIP.AUTO-MCNC: <2 MG/DL
VIT D+METAB SERPL-MCNC: 40.2 NG/ML (ref 30–100)
WBC # BLD AUTO: 6.6 X10(3) UL (ref 4–13)

## 2018-07-19 PROCEDURE — 85025 COMPLETE CBC W/AUTO DIFF WBC: CPT

## 2018-07-19 PROCEDURE — 36415 COLL VENOUS BLD VENIPUNCTURE: CPT

## 2018-07-19 PROCEDURE — 84100 ASSAY OF PHOSPHORUS: CPT

## 2018-07-19 PROCEDURE — 83735 ASSAY OF MAGNESIUM: CPT

## 2018-07-19 PROCEDURE — 80048 BASIC METABOLIC PNL TOTAL CA: CPT

## 2018-07-19 PROCEDURE — 82306 VITAMIN D 25 HYDROXY: CPT

## 2018-07-19 PROCEDURE — 81003 URINALYSIS AUTO W/O SCOPE: CPT

## 2018-07-19 PROCEDURE — 83970 ASSAY OF PARATHORMONE: CPT

## 2018-08-02 RX ORDER — LEVOTHYROXINE SODIUM 0.05 MG/1
TABLET ORAL
Qty: 90 TABLET | Refills: 1 | OUTPATIENT
Start: 2018-08-02

## 2018-08-07 RX ORDER — LEVOTHYROXINE SODIUM 0.05 MG/1
TABLET ORAL
Qty: 90 TABLET | Refills: 1 | OUTPATIENT
Start: 2018-08-07

## 2018-08-15 RX ORDER — METOPROLOL SUCCINATE 100 MG/1
TABLET, EXTENDED RELEASE ORAL
Qty: 90 TABLET | Refills: 3 | OUTPATIENT
Start: 2018-08-15

## 2018-11-06 ENCOUNTER — HOSPITAL ENCOUNTER (OUTPATIENT)
Dept: CT IMAGING | Age: 73
Discharge: HOME | End: 2018-11-06
Attending: INTERNAL MEDICINE
Payer: MEDICARE

## 2018-11-06 DIAGNOSIS — C34.91 BRONCHOGENIC LUNG CANCER, RIGHT (HCC): ICD-10-CM

## 2018-11-06 PROCEDURE — 71250 CT THORAX DX C-: CPT | Performed by: INTERNAL MEDICINE

## 2018-11-13 ENCOUNTER — OFFICE VISIT (OUTPATIENT)
Dept: HEMATOLOGY/ONCOLOGY | Age: 73
End: 2018-11-13
Attending: INTERNAL MEDICINE
Payer: MEDICARE

## 2018-11-13 VITALS
WEIGHT: 201.81 LBS | OXYGEN SATURATION: 96 % | DIASTOLIC BLOOD PRESSURE: 70 MMHG | RESPIRATION RATE: 18 BRPM | HEART RATE: 62 BPM | BODY MASS INDEX: 35 KG/M2 | TEMPERATURE: 98 F | SYSTOLIC BLOOD PRESSURE: 127 MMHG

## 2018-11-13 DIAGNOSIS — C34.12 MALIGNANT NEOPLASM OF UPPER LOBE OF LEFT LUNG (HCC): ICD-10-CM

## 2018-11-13 DIAGNOSIS — D64.9 NORMOCYTIC NORMOCHROMIC ANEMIA: Primary | ICD-10-CM

## 2018-11-13 DIAGNOSIS — Z98.84 H/O GASTRIC BYPASS: ICD-10-CM

## 2018-11-13 DIAGNOSIS — D64.9 NORMOCYTIC ANEMIA: ICD-10-CM

## 2018-11-13 DIAGNOSIS — R91.1 LUNG NODULE: ICD-10-CM

## 2018-11-13 DIAGNOSIS — R74.8 ELEVATED SERUM TRYPTASE: ICD-10-CM

## 2018-11-13 PROCEDURE — 99214 OFFICE O/P EST MOD 30 MIN: CPT | Performed by: INTERNAL MEDICINE

## 2018-11-13 NOTE — PROGRESS NOTES
Cancer Center Progress Note    Patient Name: Diane Sandoval   YOB: 1945   Medical Record Number: [id number]   CSN: [id number]   Attending Physician: Verner Crape, M.D.    Referring Physician: Lidya Lockhart MD      Date of Visit: 11/13/2018 Oral Tab, Take 1 tablet (5 mg total) by mouth daily. , Disp: 90 tablet, Rfl: 3  •  Cyanocobalamin (VITAMIN B-12) 1000 MCG Sublingual SL Tab, Place under the tongue., Disp: , Rfl:   •  Cholecalciferol (VITAMIN D) 2000 units Oral Cap, Take 2,000 Units by mout unspecified(486)    • Reflux esophagitis    • Right hip pain 12/17/2014    R hip pain   • S/P knee replacement 8/5/2013    L 2013   • S/P  shunt 1990    cervical spine shunt surgery   • Shortness of breath    • Shoulder joint dysfunction 2004    R fractu Cancer Father         lung   • Other (lung cancer) Father    • Hypertension Mother    • Diabetes Mother    • Pacemaker Mother    • Other (CHF) Mother    • Cancer Sister         lung   • Heart Attack Neg        Gyne History:  Obstetric History     T0 SWELLING    Comment:Tabs;     Review of Systems:  A 14-point ROS was done with pertinent positives and negative per the HPI    Vital Signs:  /70 (BP Location: Left arm, Patient Position: Sitting, Cuff Size: large)   Pulse 62   Temp 97.8 °F (36.6 °C) 05/07/2018     07/19/2018    K 4.3 07/19/2018     07/19/2018    CO2 27.0 07/19/2018         Radiology:  PROCEDURE:  CT CHEST (CPT=71250)     COMPARISON:  [facility], CT ANGIOGRAPHY, CHEST (CPT=71275), 7/26/2013, 8:29.   [facility], CT CHEST(CONT adenopathy. MEDIASTINUM:  No mass or adenopathy. CARDIAC:  No enlargement, pericardial thickening, or significant calcification. PLEURA:  No mass or effusion. THORACIC AORTA:  Unremarkable as seen on non-contrast imaging.    CHEST WALL:  No mass o well to IV iron. Likely anemia of chronic/renal disease. Await labs from today. RTC 6 months. Emotional Well Being:  I have assessed the patient's emotional well-being and any concerns about anxiety or depression.   We discussed issues of distre

## 2018-11-13 NOTE — PROGRESS NOTES
Outpatient Oncology Care Plan  Problem list:  respiratory  fatigue  insomnia    Problems related to:    disease/disease progression    Interventions:  provided general teaching    Expected outcomes:  symptoms relieved/minimized  understands plan of care  v

## 2018-11-23 ENCOUNTER — LAB ENCOUNTER (OUTPATIENT)
Dept: LAB | Age: 73
End: 2018-11-23
Attending: INTERNAL MEDICINE
Payer: MEDICARE

## 2018-11-23 DIAGNOSIS — N18.30 CHRONIC KIDNEY DISEASE, STAGE III (MODERATE) (HCC): ICD-10-CM

## 2018-11-23 DIAGNOSIS — E55.9 AVITAMINOSIS D: ICD-10-CM

## 2018-11-23 DIAGNOSIS — N18.9 ANEMIA OF CHRONIC RENAL FAILURE: Primary | ICD-10-CM

## 2018-11-23 DIAGNOSIS — D63.1 ANEMIA OF CHRONIC RENAL FAILURE: Primary | ICD-10-CM

## 2018-11-23 DIAGNOSIS — N25.0 RENAL OSTEODYSTROPHY: ICD-10-CM

## 2018-11-23 PROCEDURE — 36415 COLL VENOUS BLD VENIPUNCTURE: CPT

## 2018-11-23 PROCEDURE — 84100 ASSAY OF PHOSPHORUS: CPT

## 2018-11-23 PROCEDURE — 83970 ASSAY OF PARATHORMONE: CPT

## 2018-11-23 PROCEDURE — 80048 BASIC METABOLIC PNL TOTAL CA: CPT

## 2018-11-23 PROCEDURE — 85025 COMPLETE CBC W/AUTO DIFF WBC: CPT

## 2018-11-23 PROCEDURE — 82306 VITAMIN D 25 HYDROXY: CPT

## 2019-03-14 ENCOUNTER — LAB ENCOUNTER (OUTPATIENT)
Dept: LAB | Age: 74
End: 2019-03-14
Attending: INTERNAL MEDICINE
Payer: MEDICARE

## 2019-03-14 DIAGNOSIS — E55.9 VITAMIN D DEFICIENCY: ICD-10-CM

## 2019-03-14 DIAGNOSIS — N18.9 ANEMIA OF CHRONIC RENAL FAILURE: Primary | ICD-10-CM

## 2019-03-14 DIAGNOSIS — N18.4 CHRONIC KIDNEY DISEASE, STAGE IV (SEVERE) (HCC): ICD-10-CM

## 2019-03-14 DIAGNOSIS — D63.1 ANEMIA OF CHRONIC RENAL FAILURE: Primary | ICD-10-CM

## 2019-03-14 DIAGNOSIS — N25.0 RENAL OSTEODYSTROPHY: ICD-10-CM

## 2019-03-14 LAB
ANION GAP SERPL CALC-SCNC: 10 MMOL/L (ref 0–18)
BASOPHILS # BLD AUTO: 0.06 X10(3) UL (ref 0–0.2)
BASOPHILS NFR BLD AUTO: 1 %
BILIRUB UR QL STRIP.AUTO: NEGATIVE
BUN BLD-MCNC: 30 MG/DL (ref 7–18)
BUN/CREAT SERPL: 16.8 (ref 10–20)
CALCIUM BLD-MCNC: 8.6 MG/DL (ref 8.5–10.1)
CHLORIDE SERPL-SCNC: 110 MMOL/L (ref 98–107)
CLARITY UR REFRACT.AUTO: CLEAR
CO2 SERPL-SCNC: 22 MMOL/L (ref 21–32)
CREAT BLD-MCNC: 1.79 MG/DL (ref 0.55–1.02)
CREAT UR-SCNC: 40.2 MG/DL
DEPRECATED RDW RBC AUTO: 43.7 FL (ref 35.1–46.3)
EOSINOPHIL # BLD AUTO: 0.29 X10(3) UL (ref 0–0.7)
EOSINOPHIL NFR BLD AUTO: 5 %
ERYTHROCYTE [DISTWIDTH] IN BLOOD BY AUTOMATED COUNT: 13.6 % (ref 11–15)
GLUCOSE BLD-MCNC: 115 MG/DL (ref 70–99)
GLUCOSE UR STRIP.AUTO-MCNC: NEGATIVE MG/DL
HAV IGM SER QL: 2.1 MG/DL (ref 1.6–2.6)
HCT VFR BLD AUTO: 32.4 % (ref 35–48)
HGB BLD-MCNC: 10.2 G/DL (ref 12–16)
IMM GRANULOCYTES # BLD AUTO: 0.02 X10(3) UL (ref 0–1)
IMM GRANULOCYTES NFR BLD: 0.3 %
KETONES UR STRIP.AUTO-MCNC: NEGATIVE MG/DL
LYMPHOCYTES # BLD AUTO: 1.63 X10(3) UL (ref 1–4)
LYMPHOCYTES NFR BLD AUTO: 28.1 %
MCH RBC QN AUTO: 27.5 PG (ref 26–34)
MCHC RBC AUTO-ENTMCNC: 31.5 G/DL (ref 31–37)
MCV RBC AUTO: 87.3 FL (ref 80–100)
MICROALBUMIN UR-MCNC: 0.66 MG/DL
MICROALBUMIN/CREAT 24H UR-RTO: 16.4 UG/MG (ref ?–30)
MONOCYTES # BLD AUTO: 0.54 X10(3) UL (ref 0.1–1)
MONOCYTES NFR BLD AUTO: 9.3 %
NEUTROPHILS # BLD AUTO: 3.26 X10 (3) UL (ref 1.5–7.7)
NEUTROPHILS # BLD AUTO: 3.26 X10(3) UL (ref 1.5–7.7)
NEUTROPHILS NFR BLD AUTO: 56.3 %
NITRITE UR QL STRIP.AUTO: NEGATIVE
OSMOLALITY SERPL CALC.SUM OF ELEC: 301 MOSM/KG (ref 275–295)
PH UR STRIP.AUTO: 5 [PH] (ref 4.5–8)
PHOSPHATE SERPL-MCNC: 3.9 MG/DL (ref 2.5–4.9)
PLATELET # BLD AUTO: 356 10(3)UL (ref 150–450)
POTASSIUM SERPL-SCNC: 4.5 MMOL/L (ref 3.5–5.1)
PROT UR STRIP.AUTO-MCNC: NEGATIVE MG/DL
PTH-INTACT SERPL-MCNC: 164.8 PG/ML (ref 18.5–88)
RBC # BLD AUTO: 3.71 X10(6)UL (ref 3.8–5.3)
RBC UR QL AUTO: NEGATIVE
SODIUM SERPL-SCNC: 142 MMOL/L (ref 136–145)
SP GR UR STRIP.AUTO: 1.01 (ref 1–1.03)
UROBILINOGEN UR STRIP.AUTO-MCNC: <2 MG/DL
VIT D+METAB SERPL-MCNC: 27.3 NG/ML (ref 30–100)
WBC # BLD AUTO: 5.8 X10(3) UL (ref 4–11)

## 2019-03-14 PROCEDURE — 84100 ASSAY OF PHOSPHORUS: CPT

## 2019-03-14 PROCEDURE — 81001 URINALYSIS AUTO W/SCOPE: CPT

## 2019-03-14 PROCEDURE — 82306 VITAMIN D 25 HYDROXY: CPT

## 2019-03-14 PROCEDURE — 83970 ASSAY OF PARATHORMONE: CPT

## 2019-03-14 PROCEDURE — 85025 COMPLETE CBC W/AUTO DIFF WBC: CPT

## 2019-03-14 PROCEDURE — 82043 UR ALBUMIN QUANTITATIVE: CPT

## 2019-03-14 PROCEDURE — 82570 ASSAY OF URINE CREATININE: CPT

## 2019-03-14 PROCEDURE — 36415 COLL VENOUS BLD VENIPUNCTURE: CPT

## 2019-03-14 PROCEDURE — 83735 ASSAY OF MAGNESIUM: CPT

## 2019-03-14 PROCEDURE — 80048 BASIC METABOLIC PNL TOTAL CA: CPT

## 2019-04-25 PROBLEM — R00.2 PALPITATIONS: Status: ACTIVE | Noted: 2019-04-25

## 2019-05-06 ENCOUNTER — HOSPITAL ENCOUNTER (OUTPATIENT)
Dept: CT IMAGING | Age: 74
Discharge: HOME | End: 2019-05-06
Attending: INTERNAL MEDICINE
Payer: MEDICARE

## 2019-05-06 DIAGNOSIS — R91.1 LUNG NODULE: ICD-10-CM

## 2019-05-06 DIAGNOSIS — C34.12 MALIGNANT NEOPLASM OF UPPER LOBE OF LEFT LUNG (HCC): ICD-10-CM

## 2019-05-06 PROCEDURE — 71250 CT THORAX DX C-: CPT | Performed by: INTERNAL MEDICINE

## 2019-05-14 ENCOUNTER — OFFICE VISIT (OUTPATIENT)
Dept: HEMATOLOGY/ONCOLOGY | Age: 74
End: 2019-05-14
Attending: INTERNAL MEDICINE
Payer: MEDICARE

## 2019-05-14 VITALS
SYSTOLIC BLOOD PRESSURE: 124 MMHG | DIASTOLIC BLOOD PRESSURE: 72 MMHG | WEIGHT: 205.31 LBS | RESPIRATION RATE: 18 BRPM | BODY MASS INDEX: 35 KG/M2 | HEART RATE: 58 BPM | OXYGEN SATURATION: 98 % | TEMPERATURE: 97 F

## 2019-05-14 DIAGNOSIS — D63.1 ANEMIA IN STAGE 4 CHRONIC KIDNEY DISEASE (HCC): ICD-10-CM

## 2019-05-14 DIAGNOSIS — D64.9 NORMOCYTIC NORMOCHROMIC ANEMIA: Primary | ICD-10-CM

## 2019-05-14 DIAGNOSIS — D63.8 ANEMIA, CHRONIC DISEASE: ICD-10-CM

## 2019-05-14 DIAGNOSIS — C34.12 MALIGNANT NEOPLASM OF UPPER LOBE OF LEFT LUNG (HCC): ICD-10-CM

## 2019-05-14 DIAGNOSIS — N18.4 ANEMIA IN STAGE 4 CHRONIC KIDNEY DISEASE (HCC): ICD-10-CM

## 2019-05-14 DIAGNOSIS — Z98.84 H/O GASTRIC BYPASS: ICD-10-CM

## 2019-05-14 PROCEDURE — 99214 OFFICE O/P EST MOD 30 MIN: CPT | Performed by: INTERNAL MEDICINE

## 2019-05-14 NOTE — PROGRESS NOTES
Cancer Center Progress Note    Patient Name: Tawanda Dior   YOB: 1945   Medical Record Number: [id number]   CSN: [id number]   Attending Physician: Jacinda Singleton M.D.    Referring Physician: Elmira Soria MD      Date of Visit: 5/14/2019 total) by mouth daily. , Disp: 90 tablet, Rfl: 3  •  Cyanocobalamin (VITAMIN B-12) 1000 MCG Sublingual SL Tab, Place under the tongue., Disp: , Rfl:   •  Cholecalciferol (VITAMIN D) 2000 units Oral Cap, Take 2,000 Units by mouth., Disp: , Rfl:   •  Magnesiu esophagitis    • Right hip pain 12/17/2014    R hip pain   • S/P knee replacement 8/5/2013    L 2013   • S/P  shunt 1990    cervical spine shunt surgery   • Shortness of breath    • Shoulder joint dysfunction 2004    R fracture after fall, chronically da • Other (lung cancer) Father    • Hypertension Mother    • Diabetes Mother    • Pacemaker Mother    • Other (CHF) Mother    • Cancer Sister         lung   • Heart Attack Neg        Gyne History:  OB History     T0    L0    SAB0  TAB0  Ectopic Concern: Not Asked        Weight Concern: Not Asked        Special Diet: Not Asked        Back Care: Not Asked        Exercise: No        Bike Helmet: Not Asked        Seat Belt: Not Asked        Self-Exams: Not Asked    Social History Lulu Frank 1.94 (H) 0.55 - 1.02 mg/dL    BUN/CREA Ratio 19.6 10.0 - 20.0    Calcium, Total 8.2 (L) 8.5 - 10.1 mg/dL    Calculated Osmolality 287 275 - 295 mOsm/kg    GFR, Non- 25 (L) >=60    GFR, -American 29 (L) >=60    AST 14 (L) 15 - 37 U/L Registry) which includes the Dose   Index Registry. PATIENT STATED HISTORY: (As transcribed by Technologist)  Patient states lung nodule monitoring scan, history of lung cancer, no current chest complaints.          FINDINGS:    LUNGS:  There is volume changes are seen in the spine. Stable destructive changes are seen in the region of the right humeral head and right scapula with fluid collection centrally.   This may be related to an aggressive inflammatory arthritis in the absence   of any known metasta I will contact her with her lab results and plan. RTC 6 months. Emotional Well Being:  I have assessed the patient's emotional well-being and any concerns about anxiety or depression.   We discussed issues of distress, coping difficulties a

## 2019-05-15 DIAGNOSIS — D50.8 OTHER IRON DEFICIENCY ANEMIA: Primary | ICD-10-CM

## 2019-05-21 ENCOUNTER — OFFICE VISIT (OUTPATIENT)
Dept: HEMATOLOGY/ONCOLOGY | Age: 74
End: 2019-05-21
Attending: INTERNAL MEDICINE
Payer: MEDICARE

## 2019-05-21 VITALS
RESPIRATION RATE: 18 BRPM | OXYGEN SATURATION: 99 % | HEART RATE: 52 BPM | DIASTOLIC BLOOD PRESSURE: 76 MMHG | SYSTOLIC BLOOD PRESSURE: 127 MMHG | TEMPERATURE: 97 F

## 2019-05-21 DIAGNOSIS — Z98.84 H/O GASTRIC BYPASS: ICD-10-CM

## 2019-05-21 DIAGNOSIS — D50.8 OTHER IRON DEFICIENCY ANEMIA: Primary | ICD-10-CM

## 2019-05-21 DIAGNOSIS — N18.30 CKD (CHRONIC KIDNEY DISEASE) STAGE 3, GFR 30-59 ML/MIN (HCC): ICD-10-CM

## 2019-05-21 PROCEDURE — 96374 THER/PROPH/DIAG INJ IV PUSH: CPT

## 2019-05-21 NOTE — PROGRESS NOTES
Education Record    Learner:  Patient    Disease / Diagnosis: anemia    Barriers / Limitations:  None   Comments:    Method:  Brief focused   Comments:    General Topics:  Plan of care reviewed   Comments:    Outcome:  Shows understanding   Comments:    Vi

## 2019-05-28 ENCOUNTER — OFFICE VISIT (OUTPATIENT)
Dept: HEMATOLOGY/ONCOLOGY | Age: 74
End: 2019-05-28
Attending: INTERNAL MEDICINE
Payer: MEDICARE

## 2019-05-28 VITALS
HEART RATE: 56 BPM | SYSTOLIC BLOOD PRESSURE: 127 MMHG | OXYGEN SATURATION: 99 % | DIASTOLIC BLOOD PRESSURE: 75 MMHG | RESPIRATION RATE: 16 BRPM | TEMPERATURE: 98 F

## 2019-05-28 DIAGNOSIS — Z98.84 H/O GASTRIC BYPASS: ICD-10-CM

## 2019-05-28 DIAGNOSIS — D50.8 OTHER IRON DEFICIENCY ANEMIA: Primary | ICD-10-CM

## 2019-05-28 DIAGNOSIS — N18.30 CKD (CHRONIC KIDNEY DISEASE) STAGE 3, GFR 30-59 ML/MIN (HCC): ICD-10-CM

## 2019-05-28 PROCEDURE — 96374 THER/PROPH/DIAG INJ IV PUSH: CPT

## 2019-06-04 ENCOUNTER — APPOINTMENT (OUTPATIENT)
Dept: HEMATOLOGY/ONCOLOGY | Age: 74
End: 2019-06-04
Attending: INTERNAL MEDICINE
Payer: MEDICARE

## 2019-06-05 ENCOUNTER — OFFICE VISIT (OUTPATIENT)
Dept: HEMATOLOGY/ONCOLOGY | Age: 74
End: 2019-06-05
Attending: INTERNAL MEDICINE
Payer: MEDICARE

## 2019-06-05 VITALS
OXYGEN SATURATION: 98 % | DIASTOLIC BLOOD PRESSURE: 61 MMHG | SYSTOLIC BLOOD PRESSURE: 134 MMHG | RESPIRATION RATE: 16 BRPM | TEMPERATURE: 99 F | HEART RATE: 80 BPM

## 2019-06-05 DIAGNOSIS — Z98.84 H/O GASTRIC BYPASS: ICD-10-CM

## 2019-06-05 DIAGNOSIS — N18.30 CKD (CHRONIC KIDNEY DISEASE) STAGE 3, GFR 30-59 ML/MIN (HCC): ICD-10-CM

## 2019-06-05 DIAGNOSIS — D50.8 OTHER IRON DEFICIENCY ANEMIA: Primary | ICD-10-CM

## 2019-06-05 PROCEDURE — 96374 THER/PROPH/DIAG INJ IV PUSH: CPT

## 2019-06-05 NOTE — PROGRESS NOTES
Pt here for iron infusion.   Arrives Ambulating independently, accompanied by Other self           Modifications in dose or schedule: No     Frequency of blood return and site check throughout administration: Prior to administration   Discharged to Home, Am

## 2019-07-25 ENCOUNTER — LAB ENCOUNTER (OUTPATIENT)
Dept: LAB | Age: 74
End: 2019-07-25
Attending: INTERNAL MEDICINE
Payer: MEDICARE

## 2019-07-25 DIAGNOSIS — N18.4 CHRONIC KIDNEY DISEASE, STAGE IV (SEVERE) (HCC): ICD-10-CM

## 2019-07-25 DIAGNOSIS — D63.1 ANEMIA OF CHRONIC RENAL FAILURE: Primary | ICD-10-CM

## 2019-07-25 DIAGNOSIS — E55.9 AVITAMINOSIS D: ICD-10-CM

## 2019-07-25 DIAGNOSIS — N25.0 RENAL OSTEODYSTROPHY: ICD-10-CM

## 2019-07-25 DIAGNOSIS — N18.9 ANEMIA OF CHRONIC RENAL FAILURE: Primary | ICD-10-CM

## 2019-07-25 LAB
ANION GAP SERPL CALC-SCNC: 6 MMOL/L (ref 0–18)
BASOPHILS # BLD AUTO: 0.05 X10(3) UL (ref 0–0.2)
BASOPHILS NFR BLD AUTO: 1 %
BILIRUB UR QL STRIP.AUTO: NEGATIVE
BUN BLD-MCNC: 25 MG/DL (ref 7–18)
BUN/CREAT SERPL: 16 (ref 10–20)
CALCIUM BLD-MCNC: 9.2 MG/DL (ref 8.5–10.1)
CHLORIDE SERPL-SCNC: 107 MMOL/L (ref 98–112)
CLARITY UR REFRACT.AUTO: CLEAR
CO2 SERPL-SCNC: 23 MMOL/L (ref 21–32)
CREAT BLD-MCNC: 1.56 MG/DL (ref 0.55–1.02)
DEPRECATED HBV CORE AB SER IA-ACNC: 68 NG/ML (ref 18–340)
DEPRECATED RDW RBC AUTO: 48.5 FL (ref 35.1–46.3)
EOSINOPHIL # BLD AUTO: 0.3 X10(3) UL (ref 0–0.7)
EOSINOPHIL NFR BLD AUTO: 6.2 %
ERYTHROCYTE [DISTWIDTH] IN BLOOD BY AUTOMATED COUNT: 15.1 % (ref 11–15)
GLUCOSE BLD-MCNC: 115 MG/DL (ref 70–99)
GLUCOSE UR STRIP.AUTO-MCNC: NEGATIVE MG/DL
HAV IGM SER QL: 2 MG/DL (ref 1.6–2.6)
HCT VFR BLD AUTO: 34.7 % (ref 35–48)
HGB BLD-MCNC: 11.4 G/DL (ref 12–16)
IMM GRANULOCYTES # BLD AUTO: 0.02 X10(3) UL (ref 0–1)
IMM GRANULOCYTES NFR BLD: 0.4 %
IRON SATURATION: 20 % (ref 15–50)
IRON SERPL-MCNC: 73 UG/DL (ref 50–170)
KETONES UR STRIP.AUTO-MCNC: NEGATIVE MG/DL
LEUKOCYTE ESTERASE UR QL STRIP.AUTO: NEGATIVE
LYMPHOCYTES # BLD AUTO: 1.38 X10(3) UL (ref 1–4)
LYMPHOCYTES NFR BLD AUTO: 28.4 %
MCH RBC QN AUTO: 28.6 PG (ref 26–34)
MCHC RBC AUTO-ENTMCNC: 32.9 G/DL (ref 31–37)
MCV RBC AUTO: 87 FL (ref 80–100)
MONOCYTES # BLD AUTO: 0.42 X10(3) UL (ref 0.1–1)
MONOCYTES NFR BLD AUTO: 8.6 %
NEUTROPHILS # BLD AUTO: 2.69 X10 (3) UL (ref 1.5–7.7)
NEUTROPHILS # BLD AUTO: 2.69 X10(3) UL (ref 1.5–7.7)
NEUTROPHILS NFR BLD AUTO: 55.4 %
NITRITE UR QL STRIP.AUTO: NEGATIVE
OSMOLALITY SERPL CALC.SUM OF ELEC: 287 MOSM/KG (ref 275–295)
PH UR STRIP.AUTO: 5 [PH] (ref 4.5–8)
PHOSPHATE SERPL-MCNC: 3.6 MG/DL (ref 2.5–4.9)
PLATELET # BLD AUTO: 323 10(3)UL (ref 150–450)
POTASSIUM SERPL-SCNC: 4.1 MMOL/L (ref 3.5–5.1)
PROT UR STRIP.AUTO-MCNC: NEGATIVE MG/DL
PTH-INTACT SERPL-MCNC: 128.7 PG/ML (ref 18.5–88)
RBC # BLD AUTO: 3.99 X10(6)UL (ref 3.8–5.3)
RBC UR QL AUTO: NEGATIVE
SODIUM SERPL-SCNC: 136 MMOL/L (ref 136–145)
SP GR UR STRIP.AUTO: 1.01 (ref 1–1.03)
TOTAL IRON BINDING CAPACITY: 362 UG/DL (ref 240–450)
TRANSFERRIN SERPL-MCNC: 243 MG/DL (ref 200–360)
UROBILINOGEN UR STRIP.AUTO-MCNC: <2 MG/DL
VIT D+METAB SERPL-MCNC: 26.7 NG/ML (ref 30–100)
WBC # BLD AUTO: 4.9 X10(3) UL (ref 4–11)

## 2019-07-25 PROCEDURE — 82306 VITAMIN D 25 HYDROXY: CPT

## 2019-07-25 PROCEDURE — 84100 ASSAY OF PHOSPHORUS: CPT

## 2019-07-25 PROCEDURE — 83970 ASSAY OF PARATHORMONE: CPT

## 2019-07-25 PROCEDURE — 83550 IRON BINDING TEST: CPT

## 2019-07-25 PROCEDURE — 80048 BASIC METABOLIC PNL TOTAL CA: CPT

## 2019-07-25 PROCEDURE — 85025 COMPLETE CBC W/AUTO DIFF WBC: CPT

## 2019-07-25 PROCEDURE — 82728 ASSAY OF FERRITIN: CPT

## 2019-07-25 PROCEDURE — 36415 COLL VENOUS BLD VENIPUNCTURE: CPT

## 2019-07-25 PROCEDURE — 83540 ASSAY OF IRON: CPT

## 2019-07-25 PROCEDURE — 83735 ASSAY OF MAGNESIUM: CPT

## 2019-07-25 PROCEDURE — 81003 URINALYSIS AUTO W/O SCOPE: CPT

## 2019-08-19 PROBLEM — R73.01 IFG (IMPAIRED FASTING GLUCOSE): Status: ACTIVE | Noted: 2019-08-19

## 2019-08-19 PROBLEM — E66.01 SEVERE OBESITY (BMI 35.0-39.9) WITH COMORBIDITY (HCC): Status: ACTIVE | Noted: 2019-08-19

## 2019-08-19 PROBLEM — C44.311 BASAL CELL CARCINOMA (BCC) OF LATERAL SIDE WALL OF NOSE: Status: ACTIVE | Noted: 2019-08-19

## 2019-10-28 ENCOUNTER — APPOINTMENT (OUTPATIENT)
Dept: CT IMAGING | Age: 74
DRG: 330 | End: 2019-10-28
Attending: EMERGENCY MEDICINE
Payer: MEDICARE

## 2019-10-28 ENCOUNTER — APPOINTMENT (OUTPATIENT)
Dept: GENERAL RADIOLOGY | Facility: HOSPITAL | Age: 74
DRG: 330 | End: 2019-10-28
Attending: EMERGENCY MEDICINE
Payer: MEDICARE

## 2019-10-28 ENCOUNTER — APPOINTMENT (OUTPATIENT)
Dept: GENERAL RADIOLOGY | Facility: HOSPITAL | Age: 74
DRG: 330 | End: 2019-10-28
Attending: HOSPITALIST
Payer: MEDICARE

## 2019-10-28 ENCOUNTER — HOSPITAL ENCOUNTER (INPATIENT)
Facility: HOSPITAL | Age: 74
LOS: 13 days | Discharge: HOME HEALTH | DRG: 330 | End: 2019-11-10
Attending: EMERGENCY MEDICINE | Admitting: HOSPITALIST
Payer: MEDICARE

## 2019-10-28 DIAGNOSIS — K56.609 SMALL BOWEL OBSTRUCTION (HCC): Primary | ICD-10-CM

## 2019-10-28 PROBLEM — N17.9 ACUTE KIDNEY INJURY (HCC): Status: ACTIVE | Noted: 2019-10-28

## 2019-10-28 PROBLEM — E87.1 HYPONATREMIA: Status: ACTIVE | Noted: 2019-10-28

## 2019-10-28 PROBLEM — R79.89 AZOTEMIA: Status: ACTIVE | Noted: 2019-10-28

## 2019-10-28 PROCEDURE — 99223 1ST HOSP IP/OBS HIGH 75: CPT | Performed by: HOSPITALIST

## 2019-10-28 PROCEDURE — 71045 X-RAY EXAM CHEST 1 VIEW: CPT | Performed by: EMERGENCY MEDICINE

## 2019-10-28 PROCEDURE — 99223 1ST HOSP IP/OBS HIGH 75: CPT | Performed by: SURGERY

## 2019-10-28 PROCEDURE — 0D9670Z DRAINAGE OF STOMACH WITH DRAINAGE DEVICE, VIA NATURAL OR ARTIFICIAL OPENING: ICD-10-PCS | Performed by: EMERGENCY MEDICINE

## 2019-10-28 PROCEDURE — 74176 CT ABD & PELVIS W/O CONTRAST: CPT | Performed by: EMERGENCY MEDICINE

## 2019-10-28 PROCEDURE — 71045 X-RAY EXAM CHEST 1 VIEW: CPT | Performed by: HOSPITALIST

## 2019-10-28 RX ORDER — ONDANSETRON 2 MG/ML
4 INJECTION INTRAMUSCULAR; INTRAVENOUS EVERY 6 HOURS PRN
Status: DISCONTINUED | OUTPATIENT
Start: 2019-10-28 | End: 2019-11-10

## 2019-10-28 RX ORDER — SODIUM CHLORIDE 9 MG/ML
INJECTION, SOLUTION INTRAVENOUS CONTINUOUS
Status: DISCONTINUED | OUTPATIENT
Start: 2019-10-28 | End: 2019-11-02

## 2019-10-28 RX ORDER — ONDANSETRON 2 MG/ML
4 INJECTION INTRAMUSCULAR; INTRAVENOUS EVERY 4 HOURS PRN
Status: DISCONTINUED | OUTPATIENT
Start: 2019-10-28 | End: 2019-10-28

## 2019-10-28 RX ORDER — SODIUM CHLORIDE 9 MG/ML
INJECTION, SOLUTION INTRAVENOUS CONTINUOUS
Status: DISCONTINUED | OUTPATIENT
Start: 2019-10-28 | End: 2019-10-28

## 2019-10-28 RX ORDER — MORPHINE SULFATE 4 MG/ML
1 INJECTION, SOLUTION INTRAMUSCULAR; INTRAVENOUS EVERY 2 HOUR PRN
Status: DISCONTINUED | OUTPATIENT
Start: 2019-10-28 | End: 2019-11-10

## 2019-10-28 RX ORDER — HYDROMORPHONE HYDROCHLORIDE 1 MG/ML
0.5 INJECTION, SOLUTION INTRAMUSCULAR; INTRAVENOUS; SUBCUTANEOUS ONCE
Status: COMPLETED | OUTPATIENT
Start: 2019-10-28 | End: 2019-10-28

## 2019-10-28 RX ORDER — ONDANSETRON 2 MG/ML
4 INJECTION INTRAMUSCULAR; INTRAVENOUS ONCE
Status: COMPLETED | OUTPATIENT
Start: 2019-10-28 | End: 2019-10-28

## 2019-10-28 RX ORDER — ENOXAPARIN SODIUM 100 MG/ML
30 INJECTION SUBCUTANEOUS NIGHTLY
Status: DISCONTINUED | OUTPATIENT
Start: 2019-10-28 | End: 2019-11-07

## 2019-10-28 RX ORDER — METOPROLOL TARTRATE 5 MG/5ML
2.5 INJECTION INTRAVENOUS EVERY 6 HOURS
Status: DISCONTINUED | OUTPATIENT
Start: 2019-10-28 | End: 2019-11-02

## 2019-10-28 RX ORDER — HYDROMORPHONE HYDROCHLORIDE 1 MG/ML
0.5 INJECTION, SOLUTION INTRAMUSCULAR; INTRAVENOUS; SUBCUTANEOUS EVERY 30 MIN PRN
Status: DISCONTINUED | OUTPATIENT
Start: 2019-10-28 | End: 2019-10-28

## 2019-10-28 RX ORDER — MORPHINE SULFATE 4 MG/ML
2 INJECTION, SOLUTION INTRAMUSCULAR; INTRAVENOUS EVERY 2 HOUR PRN
Status: DISCONTINUED | OUTPATIENT
Start: 2019-10-28 | End: 2019-11-10

## 2019-10-28 RX ORDER — SODIUM CHLORIDE 9 MG/ML
INJECTION, SOLUTION INTRAVENOUS ONCE
Status: COMPLETED | OUTPATIENT
Start: 2019-10-28 | End: 2019-10-28

## 2019-10-28 RX ORDER — MORPHINE SULFATE 4 MG/ML
4 INJECTION, SOLUTION INTRAMUSCULAR; INTRAVENOUS EVERY 2 HOUR PRN
Status: DISCONTINUED | OUTPATIENT
Start: 2019-10-28 | End: 2019-11-10

## 2019-10-28 NOTE — PROGRESS NOTES
NURSING ADMISSION NOTE      Patient admitted via Ambulance  Oriented to room. Safety precautions initiated. Bed in low position. Call light in reach.   Pt admitted from [facility] ER for abdominal pain, aaox4, complained of nausea and abdominal pain,

## 2019-10-28 NOTE — H&P
[facility]  History and Physical     Dipika Wu Patient Status:  Inpatient    1945 MRN [id number]   [facility]A [facility] Malcolm Looney MD     Chief Complaint: Abdominal pa in left ear 8/5/2016   • HIGH BLOOD PRESSURE    • History of blood transfusion    • Hypothyroid    • Muscle weakness     weakness right side burning sensation at all times due to arnold  charlene's   • Neuropathy     both legs/ mobility loss    • Osteoarthro TONSILS,<9 Y/O     • SHOULDER SURG PROC UNLISTED  1/1/2004    right   • SKIN SURGERY     • THORACOSCOPY/VATS Left 10/29/2014    Performed by Brad Millard, Richard Merlos MD at [address] [place] [address] Left 10/29/2014    Performed by Brad Millard, Richard Merlos MD at [facility] 3  Cyanocobalamin (VITAMIN B-12) 1000 MCG Sublingual SL Tab, Place under the tongue., Disp: , Rfl:   Cholecalciferol (VITAMIN D) 2000 units Oral Cap, Take 2,000 Units by mouth., Disp: , Rfl:   Magnesium 400 MG Oral Cap, Take 400 mg by mouth., Disp: , Rfl: gastric bypass   3. GERD  1. Admit  2. NPO  3. NGT > will need to advance 6cm  4. IVF  5. PPI  6. Analgesics and antiemetics as needed  7. Surgery on consult  4. Essential hypertension  5. Paroxysmal atrial fibrillation sp ablation  1. Hold PTA agents  2.

## 2019-10-28 NOTE — ED PROVIDER NOTES
Patient Seen in: [facility] [place]      History   Patient presents with:  Abdomen/Flank Pain (GI/)    Stated Complaint: abd pain, swelling    HPI    17-year-old female complaining of abdominal pain that started about 4 5 days ag MD at [facility] CVOR   • TUBAL LIGATION                      Social History    Tobacco Use      Smoking status: Former Smoker        Packs/day: 0.00        Quit date: 3/16/1987        Years since quittin.6      Smokeless tobacco: Never Used    Alcohol use: N Narrative: The following orders were created for panel order CBC WITH DIFFERENTIAL WITH PLATELET.   Procedure                               Abnormality         Status                     ---------                               -----------         ------

## 2019-10-28 NOTE — PROGRESS NOTES
[facility] Pharmacy Note:  Renal Dose Adjustment for Enoxaparin (LOVENOX)    Nisha Mares has been prescribed Enoxaparin (LOVENOX) 40 mg subcutaneously every 24 hours. Estimated Creatinine Clearance: 19.8 mL/min (A) (based on SCr of 2.09 mg/dL (H)).     Her c

## 2019-10-28 NOTE — CONSULTS
Montse Neves is a 66year old female  Patient presents with:  Abdomen/Flank Pain (GI/)      REFERRED BY    Patient presents with complaints of anorexia vomiting for the past week. Patient states 1 week ago she developed an abdominal bloating.   This wa shoulder pain 8/12/2014    After 2004- fall and injury to R shoulder   • Depression    • Esophageal reflux    • Essential hypertension    • Gallstones 8/5/2016   • H/O gastric bypass 2012   • H/O gastric bypass    • H/O spina bifida 1993    chairi malform GASTRIC BYPASS FOR MORBID  2012    bowel obstructions complication needing x3 more surgeries   • NEEDLE BIOPSY RIGHT  ? ?   • OTHER SURGICAL HISTORY  1993    craniotomy chiari repair flo   • OTHER SURGICAL HISTORY  1990    spine repair spina bifida   • (FLINTSTONES GUMMIES) Oral Chew Tab, Chew 1 Tab by mouth daily. , Disp: , Rfl:       @ALL@  Family History   Problem Relation Age of Onset   • Cancer Father         lung   • Other (lung cancer) Father    • Hypertension Mother    • Diabetes Mother    • Pacem wheezing  CARDIOVASCULAR: RRR, murmur negative  ABDOMEN: Midline cicatrix soft x4 quadrants no rebound guarding rigidity some tenderness throughout  LYMPHATIC: no axillary , supraclavicular or inguinal lymphadenopathy  EXTREMITIES: no cyanosis, clubbing or Final   • BUN/CREA Ratio 07/25/2019 16.0  10.0 - 20.0 Final   • Calcium, Total 07/25/2019 9.2  8.5 - 10.1 mg/dL Final   • Calculated Osmolality 07/25/2019 287  275 - 295 mOsm/kg Final   • GFR, Non- 07/25/2019 32* >=60 Final   • GFR,  Lymphocyte % 07/25/2019 28.4  % Final   • Monocyte % 07/25/2019 8.6  % Final   • Eosinophil % 07/25/2019 6.2  % Final   • Basophil % 07/25/2019 1.0  % Final   • Immature Granulocyte % 07/25/2019 0.4  % Final       ASSESSMENT   Imp: Small bowel obstruction

## 2019-10-29 ENCOUNTER — APPOINTMENT (OUTPATIENT)
Dept: GENERAL RADIOLOGY | Facility: HOSPITAL | Age: 74
DRG: 330 | End: 2019-10-29
Attending: HOSPITALIST
Payer: MEDICARE

## 2019-10-29 PROCEDURE — 99232 SBSQ HOSP IP/OBS MODERATE 35: CPT | Performed by: SURGERY

## 2019-10-29 PROCEDURE — 99233 SBSQ HOSP IP/OBS HIGH 50: CPT | Performed by: HOSPITALIST

## 2019-10-29 PROCEDURE — 74019 RADEX ABDOMEN 2 VIEWS: CPT | Performed by: HOSPITALIST

## 2019-10-29 RX ORDER — BISACODYL 10 MG
10 SUPPOSITORY, RECTAL RECTAL ONCE
Status: COMPLETED | OUTPATIENT
Start: 2019-10-29 | End: 2019-10-29

## 2019-10-29 NOTE — PLAN OF CARE
Seen ambulating the halls 2-3x this shift. Now passing gas & had 1 very small stool after rectal suppository given.

## 2019-10-29 NOTE — PLAN OF CARE
Resting in bed,asleep. Arousable & responsive. Still with NG tube to the  nares to LIS. With small amount of drainage noted. Remains to have constant abdominal pain, morphine 2mg given per IV w/ relief noted.  Given 2 doses of zofran for c/o nausea since mor

## 2019-10-29 NOTE — PROGRESS NOTES
[facility]  Progress Note    Matthew Fong Patient Status:  Inpatient    1945 MRN [id number]   [facility] 4NW-A Attending Alexia Curling, MD   Hosp Day # 1 PCP Florinda Velásquez MD     Subjective:  Patient states her abdominal pain is 10/28/2019          Assessment/Plan:  Patient Active Problem List:     Essential hypertension     Dyslipidemia     GERD (gastroesophageal reflux disease)     H/O gastric bypass     Arthritis of knee, left     1st degree AV block     H/O spina bifida     S/

## 2019-10-29 NOTE — PROGRESS NOTES
[facility]  Progress Note     Melvina Walls Patient Status:  Inpatient    1945 MRN [id number]   [facility] 4NW-A Attending Luciana Epley, MD   Hosp Day # 1 PCP Yaima Perez MD     Chief Complaint: SBO, SHAD    S: Patient state 30 mg Subcutaneous Nightly       ASSESSMENT / PLAN:     1. SBO  2. History of gastric bypass   3. GERD  1. NPO  2. NGT  3. IVF  4. PPI  5. Analgesics and antiemetics as needed  6. Obstructive series   7. Surgery on consult  4. Essential hypertension  5.  Pa

## 2019-10-29 NOTE — DIETARY NOTE
[address]     Admitting diagnosis:  Small bowel obstruction ([facility] [address] [Y86.259]    Ht: 162.6 cm (5' 4\")  Wt: 93 kg (205 lb). Body mass index is 35.19 kg/m².     Wt Readings from Last 6 Encounters:  10/29/19 : 93 k

## 2019-10-30 PROCEDURE — 99232 SBSQ HOSP IP/OBS MODERATE 35: CPT | Performed by: HOSPITALIST

## 2019-10-30 PROCEDURE — 99232 SBSQ HOSP IP/OBS MODERATE 35: CPT | Performed by: SURGERY

## 2019-10-30 RX ORDER — ACETAMINOPHEN 325 MG/1
650 TABLET ORAL EVERY 6 HOURS PRN
Status: DISCONTINUED | OUTPATIENT
Start: 2019-10-30 | End: 2019-11-01

## 2019-10-30 RX ORDER — BISACODYL 10 MG
10 SUPPOSITORY, RECTAL RECTAL ONCE
Status: COMPLETED | OUTPATIENT
Start: 2019-10-30 | End: 2019-10-30

## 2019-10-30 NOTE — PROGRESS NOTES
[facility]  Progress Note    Hardeep Healy Patient Status:  Inpatient    1945 MRN [id number]   [facility] 4NW-A Attending Melody Cervantes MD   Hosp Day # 2 PCP Kai Giron MD     Subjective:  Patient states that she feels much b of knee, left     1st degree AV block     H/O spina bifida     S/P knee replacement     Hip arthritis     Arnold-Chiari deformity (HCC)     Whole body pain     BPPV (benign paroxysmal positional vertigo)     S/P  shunt     Hypothyroid     Esophagitis

## 2019-10-30 NOTE — PLAN OF CARE
Problem: PAIN - ADULT  Goal: Verbalizes/displays adequate comfort level or patient's stated pain goal  Description  INTERVENTIONS:  - Encourage pt to monitor pain and request assistance  - Assess pain using appropriate pain scale  - Administer analgesics nonpharmacologic comfort measures as appropriate  - Advance diet as tolerated, if ordered  - Obtain nutritional consult as needed  - Evaluate fluid balance  Outcome: Progressing  Pt alert and oriented X4, vital stable, afebrile,c/o abd pain, pain meds give

## 2019-10-30 NOTE — PROGRESS NOTES
[facility]  Progress Note     Adysatish Umanzoryusuf Patient Status:  Inpatient    1945 MRN [id number]   [facility] 4NW-A Attending Dav Ortiz MD   Hosp Day # 2 PCP Jesse Merino MD     Chief Complaint: SBO  S:  Feels better.  Oliver Paz fibrillation sp ablation  1. IV BB  6. Acute kidney injury on chronic kidney disease, improving  7. Hyponatremia, resolved  8. Hyperglycemia- A1c 6.1   9. Hypothyroidism- Hold Synthroid, will change to IV if with prolonged NPO status  10.  Lung cancer- No a

## 2019-10-30 NOTE — PLAN OF CARE
Patient claims she has felt stomach discomfort after she had clear liquids for lunch. No c/o nausea & vomiting but c/o abdominal cramping. Wouls still like to continue clear liquids but intends to take little at a time. NG remains clamped.  Ambulating the ha

## 2019-10-30 NOTE — PLAN OF CARE
NG clamped per surgery instructions & clear liquids offered to patient. Tolerated well,no c/o nausea after intake of clear liquids. Rectal suppository to promote more BM today. Patient ambulating the halls.  Encouraged to continue ambulation & increase acti

## 2019-10-31 ENCOUNTER — APPOINTMENT (OUTPATIENT)
Dept: GENERAL RADIOLOGY | Facility: HOSPITAL | Age: 74
DRG: 330 | End: 2019-10-31
Attending: SURGERY
Payer: MEDICARE

## 2019-10-31 PROCEDURE — 74018 RADEX ABDOMEN 1 VIEW: CPT | Performed by: SURGERY

## 2019-10-31 PROCEDURE — 99232 SBSQ HOSP IP/OBS MODERATE 35: CPT | Performed by: HOSPITALIST

## 2019-10-31 NOTE — PROGRESS NOTES
[facility]  Progress Note     Garcia Adam Patient Status:  Inpatient    1945 MRN [id number]   [facility] 4NW-A Attending Antonino Bunch MD   Hosp Day # 3 PCP Kalyani Valle MD     Chief Complaint: SBO  S:  Vomited back on L fibrillation sp ablation  1. IV BB  6. Acute kidney injury on chronic kidney disease, improving  7. Hyponatremia, resolved  8. Hyperglycemia- A1c 6.1   9. Hypothyroidism- Hold Synthroid, will change to IV if with prolonged NPO status  10.  Lung cancer- No a

## 2019-10-31 NOTE — PLAN OF CARE
Pt a/o x4. Room air. Walked in halls before going to bed. NG clamped. Denies nausea/ vomiting overnight. Tylenol given for headache with relief. Ivf infusing. C/o LUQ abdominal pain, declined pain med. Afebrile. Slept well. 0781- passing gas but no BM. 0500

## 2019-10-31 NOTE — PROGRESS NOTES
[facility]  Progress Note    Evi Toney Patient Status:  Inpatient    1945 MRN [id number]   [facility] 4NW-A Attending Latoya Magana MD   Hosp Day # 3 PCP Josué Hancock MD     Subjective:  Patient had episode of vomiting last pain     Fibromyalgia     Normocytic normochromic anemia     Tinnitus of both ears     Gallstones     Hearing loss in left ear     Chronic pain of both shoulders     CKD (chronic kidney disease) stage 3, GFR 30-59 ml/min ([facility])     Iron deficiency anemia

## 2019-11-01 ENCOUNTER — ANESTHESIA EVENT (OUTPATIENT)
Dept: SURGERY | Facility: HOSPITAL | Age: 74
DRG: 330 | End: 2019-11-01
Payer: MEDICARE

## 2019-11-01 PROCEDURE — 99232 SBSQ HOSP IP/OBS MODERATE 35: CPT | Performed by: SURGERY

## 2019-11-01 PROCEDURE — 99233 SBSQ HOSP IP/OBS HIGH 50: CPT | Performed by: HOSPITALIST

## 2019-11-01 RX ORDER — FAMOTIDINE 10 MG/ML
20 INJECTION, SOLUTION INTRAVENOUS DAILY
Status: DISCONTINUED | OUTPATIENT
Start: 2019-11-01 | End: 2019-11-02

## 2019-11-01 RX ORDER — DIAPER,BRIEF,INFANT-TODD,DISP
EACH MISCELLANEOUS 2 TIMES DAILY
Status: DISCONTINUED | OUTPATIENT
Start: 2019-11-01 | End: 2019-11-03

## 2019-11-01 RX ORDER — DIPHENHYDRAMINE HYDROCHLORIDE 50 MG/ML
12.5 INJECTION INTRAMUSCULAR; INTRAVENOUS EVERY 4 HOURS PRN
Status: DISCONTINUED | OUTPATIENT
Start: 2019-11-01 | End: 2019-11-02

## 2019-11-01 RX ORDER — ACETAMINOPHEN 10 MG/ML
1000 INJECTION, SOLUTION INTRAVENOUS EVERY 6 HOURS PRN
Status: DISCONTINUED | OUTPATIENT
Start: 2019-11-01 | End: 2019-11-09

## 2019-11-01 RX ORDER — POTASSIUM CHLORIDE 14.9 MG/ML
20 INJECTION INTRAVENOUS ONCE
Status: COMPLETED | OUTPATIENT
Start: 2019-11-01 | End: 2019-11-01

## 2019-11-01 RX ORDER — DIPHENHYDRAMINE HCL 25 MG
25 CAPSULE ORAL EVERY 4 HOURS PRN
Status: DISCONTINUED | OUTPATIENT
Start: 2019-11-01 | End: 2019-11-02

## 2019-11-01 NOTE — PROGRESS NOTES
[facility]  Progress Note     Michellereal Jeanine Patient Status:  Inpatient    1945 MRN [id number]   [facility] 4NW-A Attending Angel Villalpando MD   Hosp Day # 4 PCP Raji Lagunas MD     Chief Complaint: SBO, SHAD    S: Patient Joseph Keene SCr of 1.26 mg/dL (H)). No results for input(s): PTP, INR in the last 168 hours. No results for input(s): TROP, CK in the last 168 hours. Imaging: Imaging data reviewed in Epic.     Medications:   • famoTIDine  20 mg Intravenous Daily   • hydr

## 2019-11-01 NOTE — PLAN OF CARE
NGT to LIS is patent with yellow return. Tylenol x1 for c/o headache, Morphine for cramping , abdomen tender, passing flatus. Ambulated in hart x1. IV restarted.  Pt has a rash on her lower abdomen and back that she states she ;just noted today It is red, i

## 2019-11-01 NOTE — ANESTHESIA PREPROCEDURE EVALUATION
PRE-OP EVALUATION    Patient Name: Donn Burciaga    Pre-op Diagnosis: INPT    Procedure(s):  EXPLORATORY LAPAROTOMY, POSSIBLE BOWEL RESECTION    Surgeon(s) and Role:     * Yovani Bloom, DO - Primary    Pre-op vitals reviewed.   Temp: 97.9 °F (36.6 °C)  P Oral Tab, Take 1 tablet (20 mg total) by mouth daily. , Disp: 90 tablet, Rfl: 3  Metoprolol Succinate  MG Oral Tablet 24 Hr, TAKE ONE TABLET BY MOUTH ONCE DAILY, Disp: 90 tablet, Rfl: 3  amLODIPine Besylate 5 MG Oral Tab, Take 1 tablet (5 mg total) by Past Surgical History:   Procedure Laterality Date   • ABLATION  2010    svt ablation in sept 2010   • APPENDECTOMY     • [address]    cervical shunt   • COLONOSCOPY  2016   • GASTRIC BYPASS,OBESITY,SB RECONSTRUC  8/2012 11/01/2019    BUN 18 11/01/2019    CREATSERUM 1.26 (H) 11/01/2019    GLU 76 11/01/2019    CA 8.6 11/01/2019            Airway      Mallampati: III  Mouth opening: 3 FB  TM distance: 4 - 6 cm  Neck ROM: full Cardiovascular    Cardiovascular exam normal.  Rh

## 2019-11-01 NOTE — PROGRESS NOTES
[facility] Pharmacy Note:  Renal Dose Adjustment    Hardeep Healy has been prescribed famotidine (PEPCID) 20 mg intravenously every 12 hours. Estimated Creatinine Clearance: 28.3 mL/min (A) (based on SCr of 1.46 mg/dL (H)).     Her calculated creatinine cleara

## 2019-11-01 NOTE — DIETARY NOTE
[address]     Admitting diagnosis:  Small bowel obstruction ([facility] [address] [M67.319]    Nutrition Diagnosis: Inadequate energy intake related to decreased ability to consume sufficient energy as evidenced by SBO and NPO

## 2019-11-01 NOTE — PROGRESS NOTES
[facility]  Progress Note    Filiberto Gutierrez Patient Status:  Inpatient    1945 MRN [id number]   [facility] 4NW-A Attending Pascale Desai MD   [facility] Day # 4 PCP Omar Montes MD     Subjective:   The patient states her abdominal genaro normochromic anemia     Tinnitus of both ears     Gallstones     Hearing loss in left ear     Chronic pain of both shoulders     CKD (chronic kidney disease) stage 3, GFR 30-59 ml/min ([facility])     Iron deficiency anemia     Diarrhea due to malabsorption     L discussed adhesions the nature of adhesions and the fact that they may cause significant scarring with possible enterotomies during the course of the procedure.   They are aware of the risks of surgery to include bleeding infection pneumonia DVT MI and stro

## 2019-11-01 NOTE — CM/SW NOTE
PCP list given to patient and family per MD request.      Matthew Pacheco, MSN RN, CTL/  P: [phone]

## 2019-11-01 NOTE — PLAN OF CARE
Pt c/o intermittent abdominal cramping- morphine given x1. Encouraged ambulation- walked hallways x3 with walker. NG to LIS with mustard yellow output- 1000ml emptied on day shift. PIV access lost- hard stick; vascular access team paged.   Passed on to n Achieves optimal ventilation and oxygenation  Description  INTERVENTIONS:  - Assess for changes in respiratory status  - Assess for changes in mentation and behavior  - Position to facilitate oxygenation and minimize respiratory effort  - Oxygen supplement

## 2019-11-02 ENCOUNTER — ANESTHESIA (OUTPATIENT)
Dept: SURGERY | Facility: HOSPITAL | Age: 74
DRG: 330 | End: 2019-11-02
Payer: MEDICARE

## 2019-11-02 PROCEDURE — 3E0M05Z INTRODUCTION OF ADHESION BARRIER INTO PERITONEAL CAVITY, OPEN APPROACH: ICD-10-PCS | Performed by: SURGERY

## 2019-11-02 PROCEDURE — 0DB80ZZ EXCISION OF SMALL INTESTINE, OPEN APPROACH: ICD-10-PCS | Performed by: SURGERY

## 2019-11-02 PROCEDURE — 99233 SBSQ HOSP IP/OBS HIGH 50: CPT | Performed by: HOSPITALIST

## 2019-11-02 PROCEDURE — 44120 REMOVAL OF SMALL INTESTINE: CPT | Performed by: SURGERY

## 2019-11-02 PROCEDURE — 0DN80ZZ RELEASE SMALL INTESTINE, OPEN APPROACH: ICD-10-PCS | Performed by: SURGERY

## 2019-11-02 DEVICE — SEPRAFILM ADHESION BARRIER (MEMBRANE) IS A STERILE, BIORESORBABLE, TRANSLUCENT ADHESION BARRIER COMPOSED OF TWO ANIONIC POLYSACCHARIDES, SODIUM HYALURONATE (HA) AND CARBOXYMETHYLCELLULOSE (CMC).
Type: IMPLANTABLE DEVICE | Site: ABDOMEN | Status: FUNCTIONAL
Brand: SEPRAFILM

## 2019-11-02 RX ORDER — MEPERIDINE HYDROCHLORIDE 25 MG/ML
12.5 INJECTION INTRAMUSCULAR; INTRAVENOUS; SUBCUTANEOUS AS NEEDED
Status: DISCONTINUED | OUTPATIENT
Start: 2019-11-02 | End: 2019-11-02 | Stop reason: HOSPADM

## 2019-11-02 RX ORDER — HYDROMORPHONE HYDROCHLORIDE 1 MG/ML
INJECTION, SOLUTION INTRAMUSCULAR; INTRAVENOUS; SUBCUTANEOUS
Status: COMPLETED
Start: 2019-11-02 | End: 2019-11-02

## 2019-11-02 RX ORDER — METOPROLOL TARTRATE 5 MG/5ML
2.5 INJECTION INTRAVENOUS ONCE
Status: COMPLETED | OUTPATIENT
Start: 2019-11-02 | End: 2019-11-02

## 2019-11-02 RX ORDER — HYDROMORPHONE HYDROCHLORIDE 1 MG/ML
0.5 INJECTION, SOLUTION INTRAMUSCULAR; INTRAVENOUS; SUBCUTANEOUS EVERY 2 HOUR PRN
Status: DISCONTINUED | OUTPATIENT
Start: 2019-11-02 | End: 2019-11-10

## 2019-11-02 RX ORDER — METOCLOPRAMIDE HYDROCHLORIDE 5 MG/ML
10 INJECTION INTRAMUSCULAR; INTRAVENOUS AS NEEDED
Status: DISCONTINUED | OUTPATIENT
Start: 2019-11-02 | End: 2019-11-02 | Stop reason: HOSPADM

## 2019-11-02 RX ORDER — MIDAZOLAM HYDROCHLORIDE 1 MG/ML
INJECTION INTRAMUSCULAR; INTRAVENOUS AS NEEDED
Status: DISCONTINUED | OUTPATIENT
Start: 2019-11-02 | End: 2019-11-02 | Stop reason: SURG

## 2019-11-02 RX ORDER — ONDANSETRON 2 MG/ML
4 INJECTION INTRAMUSCULAR; INTRAVENOUS AS NEEDED
Status: DISCONTINUED | OUTPATIENT
Start: 2019-11-02 | End: 2019-11-02 | Stop reason: HOSPADM

## 2019-11-02 RX ORDER — METOCLOPRAMIDE HYDROCHLORIDE 5 MG/ML
INJECTION INTRAMUSCULAR; INTRAVENOUS AS NEEDED
Status: DISCONTINUED | OUTPATIENT
Start: 2019-11-02 | End: 2019-11-02 | Stop reason: SURG

## 2019-11-02 RX ORDER — SODIUM CHLORIDE, SODIUM LACTATE, POTASSIUM CHLORIDE, CALCIUM CHLORIDE 600; 310; 30; 20 MG/100ML; MG/100ML; MG/100ML; MG/100ML
INJECTION, SOLUTION INTRAVENOUS CONTINUOUS
Status: DISCONTINUED | OUTPATIENT
Start: 2019-11-02 | End: 2019-11-02 | Stop reason: HOSPADM

## 2019-11-02 RX ORDER — HYDROMORPHONE HYDROCHLORIDE 1 MG/ML
1 INJECTION, SOLUTION INTRAMUSCULAR; INTRAVENOUS; SUBCUTANEOUS EVERY 2 HOUR PRN
Status: DISCONTINUED | OUTPATIENT
Start: 2019-11-02 | End: 2019-11-10

## 2019-11-02 RX ORDER — DEXTROSE AND SODIUM CHLORIDE 5; .45 G/100ML; G/100ML
INJECTION, SOLUTION INTRAVENOUS CONTINUOUS
Status: DISCONTINUED | OUTPATIENT
Start: 2019-11-02 | End: 2019-11-06

## 2019-11-02 RX ORDER — NALOXONE HYDROCHLORIDE 0.4 MG/ML
80 INJECTION, SOLUTION INTRAMUSCULAR; INTRAVENOUS; SUBCUTANEOUS AS NEEDED
Status: DISCONTINUED | OUTPATIENT
Start: 2019-11-02 | End: 2019-11-02 | Stop reason: HOSPADM

## 2019-11-02 RX ORDER — CEFOXITIN 2 G/1
INJECTION, POWDER, FOR SOLUTION INTRAVENOUS AS NEEDED
Status: DISCONTINUED | OUTPATIENT
Start: 2019-11-02 | End: 2019-11-02 | Stop reason: SURG

## 2019-11-02 RX ORDER — DILTIAZEM HYDROCHLORIDE 5 MG/ML
10 INJECTION INTRAVENOUS ONCE
Status: COMPLETED | OUTPATIENT
Start: 2019-11-02 | End: 2019-11-02

## 2019-11-02 RX ORDER — DILTIAZEM HYDROCHLORIDE 60 MG/1
60 TABLET, FILM COATED ORAL AS NEEDED
Status: DISCONTINUED | OUTPATIENT
Start: 2019-11-02 | End: 2019-11-10

## 2019-11-02 RX ORDER — METOPROLOL TARTRATE 5 MG/5ML
5 INJECTION INTRAVENOUS EVERY 6 HOURS
Status: DISCONTINUED | OUTPATIENT
Start: 2019-11-02 | End: 2019-11-09

## 2019-11-02 RX ORDER — DILTIAZEM HYDROCHLORIDE 5 MG/ML
INJECTION INTRAVENOUS
Status: DISPENSED
Start: 2019-11-02 | End: 2019-11-03

## 2019-11-02 RX ORDER — HYDROMORPHONE HYDROCHLORIDE 1 MG/ML
0.4 INJECTION, SOLUTION INTRAMUSCULAR; INTRAVENOUS; SUBCUTANEOUS EVERY 5 MIN PRN
Status: DISCONTINUED | OUTPATIENT
Start: 2019-11-02 | End: 2019-11-02 | Stop reason: HOSPADM

## 2019-11-02 RX ORDER — DIPHENHYDRAMINE HYDROCHLORIDE 50 MG/ML
12.5 INJECTION INTRAMUSCULAR; INTRAVENOUS AS NEEDED
Status: DISCONTINUED | OUTPATIENT
Start: 2019-11-02 | End: 2019-11-02 | Stop reason: HOSPADM

## 2019-11-02 RX ORDER — DEXTROSE MONOHYDRATE 25 G/50ML
50 INJECTION, SOLUTION INTRAVENOUS
Status: DISCONTINUED | OUTPATIENT
Start: 2019-11-02 | End: 2019-11-02 | Stop reason: HOSPADM

## 2019-11-02 RX ORDER — MAGNESIUM HYDROXIDE 1200 MG/15ML
LIQUID ORAL CONTINUOUS PRN
Status: COMPLETED | OUTPATIENT
Start: 2019-11-02 | End: 2019-11-02

## 2019-11-02 RX ORDER — ROCURONIUM BROMIDE 10 MG/ML
INJECTION, SOLUTION INTRAVENOUS AS NEEDED
Status: DISCONTINUED | OUTPATIENT
Start: 2019-11-02 | End: 2019-11-02 | Stop reason: SURG

## 2019-11-02 RX ORDER — FAMOTIDINE 10 MG/ML
20 INJECTION, SOLUTION INTRAVENOUS NIGHTLY
Status: DISCONTINUED | OUTPATIENT
Start: 2019-11-02 | End: 2019-11-09

## 2019-11-02 RX ORDER — FAMOTIDINE 10 MG/ML
20 INJECTION, SOLUTION INTRAVENOUS 2 TIMES DAILY
Status: DISCONTINUED | OUTPATIENT
Start: 2019-11-02 | End: 2019-11-02

## 2019-11-02 RX ORDER — DIPHENHYDRAMINE HYDROCHLORIDE 50 MG/ML
25 INJECTION INTRAMUSCULAR; INTRAVENOUS ONCE
Status: COMPLETED | OUTPATIENT
Start: 2019-11-02 | End: 2019-11-02

## 2019-11-02 RX ORDER — DIPHENHYDRAMINE HYDROCHLORIDE 50 MG/ML
25 INJECTION INTRAMUSCULAR; INTRAVENOUS EVERY 6 HOURS PRN
Status: DISCONTINUED | OUTPATIENT
Start: 2019-11-02 | End: 2019-11-10

## 2019-11-02 RX ADMIN — MIDAZOLAM HYDROCHLORIDE 2 MG: 1 INJECTION INTRAMUSCULAR; INTRAVENOUS at 08:10:00

## 2019-11-02 RX ADMIN — SODIUM CHLORIDE: 9 INJECTION, SOLUTION INTRAVENOUS at 09:45:00

## 2019-11-02 RX ADMIN — CEFOXITIN 2 G: 2 INJECTION, POWDER, FOR SOLUTION INTRAVENOUS at 08:35:00

## 2019-11-02 RX ADMIN — ONDANSETRON 4 MG: 2 INJECTION INTRAMUSCULAR; INTRAVENOUS at 08:33:00

## 2019-11-02 RX ADMIN — SODIUM CHLORIDE: 9 INJECTION, SOLUTION INTRAVENOUS at 08:05:00

## 2019-11-02 RX ADMIN — ROCURONIUM BROMIDE 70 MG: 10 INJECTION, SOLUTION INTRAVENOUS at 08:14:00

## 2019-11-02 RX ADMIN — METOCLOPRAMIDE HYDROCHLORIDE 10 MG: 5 INJECTION INTRAMUSCULAR; INTRAVENOUS at 08:33:00

## 2019-11-02 NOTE — PLAN OF CARE
Assumed care at 1700. Pt A/O x4. 3L O2 per NC. Aflutter on tele in the 140s. Cardizem gtt at 10ml/hr. Dr. Twila Castellanos paged, order to bump cardizem gtt up to  20 ml/hr, put on titratable protocol, and 5mg IV lopressor. HR now down to the 70s-80s. NPO.  NGT

## 2019-11-02 NOTE — PROGRESS NOTES
[facility]  Progress Note     Graham Leventhal Patient Status:  Inpatient    1945 MRN [id number]   [facility] 4NW-A Attending Walter Knott MD   Hosp Day # 5 PCP Jimmy Mcnally MD     Chief Complaint: SBO, SHAD    S: Patient compl (based on SCr of 1.26 mg/dL (H)). No results for input(s): PTP, INR in the last 168 hours. No results for input(s): TROP, CK in the last 168 hours. Imaging: Imaging data reviewed in Epic.     Medications:   • iron sucrose  200 mg Intravenous D

## 2019-11-02 NOTE — PROGRESS NOTES
[facility] Cardiology Consultation    [facility] Patient Status:  Inpatient    1945 MRN [id number]   [facility] 4NW-A Attending Kelly Mims MD   Hosp Day # 5 PCP Edy Scott MD     Reason for Consultation:  Atrial flutter      Histor generalized or localized, unspecified site     knees   • Paroxysmal A-fib ([address] 11/11/2014    Ablation in [address] [facility] 9/20/2010   • Pneumonia, organism unspecified(486)    • Reflux esophagitis    • Right hip pain 12/17/2014    R hip pain   • S/P knee replacement 8/ Brad Millard, Demar Farris MD at [facility] CVOR   • TONSILLECTOMY     • TUBAL LIGATION       Family History   Problem Relation Age of Onset   • Cancer Father         lung   • Other (lung cancer) Father    • Hypertension Mother    • Diabetes Mother    • Pacemaker Gama and dry.           Telemetry: flutter likely    Laboratories and Data:  Diagnostics:    EKG, 11/2/2019:  none    CXR, 11/2/2019:  none    Labs:   HEM:  Recent Labs   Lab 10/28/19  1141 10/29/19  0745 10/30/19  0518 11/01/19  0956   WBC 10.2 6.5 5.6 4.3   HG

## 2019-11-02 NOTE — ANESTHESIA PROCEDURE NOTES
Airway  Date/Time: 11/2/2019 8:15 AM  Urgency: elective    Airway not difficult    General Information and Staff    Patient location during procedure: OR  Anesthesiologist: Elis Ulloa MD  Performed: anesthesiologist     Indications and Patient Co

## 2019-11-02 NOTE — PLAN OF CARE
Denies nausea/ vomiting overnight. NG to LIS with 200 ml yellow/tan output overnight. Prn pain med given x1 for abdominal cramping. No bm, not passing gas yet. NPO midnight for surgery this morning. Ivf infusing. Slept.    Problem: PAIN - ADULT  Goal: Brian

## 2019-11-02 NOTE — ANESTHESIA POSTPROCEDURE EVALUATION
[address] [facility] Patient Status:  Inpatient   Age/Gender 66year old female MRN [id number]   [facility] SURGERY Attending Sarah Bishop, [address] [facility] [address] Day # 5 PCP Lidya Lockhart MD       Anesthesia Post-op Note    Procedure(s):  EX

## 2019-11-02 NOTE — PROGRESS NOTES
Pt received from PACU aaox4, family at bedside, complained of 10/10 abdominal pain, morphine 4mg ivp given as needed, NGT in placed, attached to LIS, no output since then, rodriguez cath in place w/ moderate clear coty urine, scheduled toprol 2.5mg ivp given,

## 2019-11-02 NOTE — PLAN OF CARE
Pt alert and oriented x4, RA. NG tube to low intermittent suction with 500ml output this shift. C/o redness and rash on abdomen and back. MD aware and ordered hydrocortisone cream BID and benadryl PRN.  Pt reporting headache this afternoon; IV tylenol admin injury  - Provide assistive devices as appropriate  - Consider OT/PT consult to assist with strengthening/mobility  - Encourage toileting schedule  Outcome: Progressing     Problem: RESPIRATORY - ADULT  Goal: Achieves optimal ventilation and oxygenation  D patient/family  Outcome: Progressing

## 2019-11-03 PROCEDURE — 99233 SBSQ HOSP IP/OBS HIGH 50: CPT | Performed by: HOSPITALIST

## 2019-11-03 PROCEDURE — 99222 1ST HOSP IP/OBS MODERATE 55: CPT | Performed by: INTERNAL MEDICINE

## 2019-11-03 RX ORDER — DIAPER,BRIEF,INFANT-TODD,DISP
EACH MISCELLANEOUS 4 TIMES DAILY
Status: DISCONTINUED | OUTPATIENT
Start: 2019-11-03 | End: 2019-11-10

## 2019-11-03 RX ORDER — SODIUM CHLORIDE 9 MG/ML
INJECTION, SOLUTION INTRAVENOUS ONCE
Status: COMPLETED | OUTPATIENT
Start: 2019-11-03 | End: 2019-11-03

## 2019-11-03 NOTE — PLAN OF CARE
Assumed care at 0700. Pt A/O x4. 2L O2 per NC. NSR with PVCs on tele. VSS. NGT removed this morning. Pt able to have ice chips. Denies any nausea. Abdominal Incision with abd pad and tape is c/d/I. Pt complaining of pain rated 8/10.   PRN Dilaudid given w Instruct pt to call for assistance with activity based on assessment  - Modify environment to reduce risk of injury  - Provide assistive devices as appropriate  - Consider OT/PT consult to assist with strengthening/mobility  - Encourage toileting schedule needed  - Advance activity as appropriate  - Communicate ordered activity level and limitations with patient/family  Outcome: Progressing

## 2019-11-03 NOTE — PROGRESS NOTES
[facility] Cardiology Progress Note        Alexa Amaya Patient Status:  Inpatient    1945 MRN [id number]   [facility] 7NE-A Attending Prudencio Buerger, MD   [address] Day # 6 PCP Makenzie Daugherty MD     Subjective:  Kathy Rocha sinus    EKG:      Echo:      Cardiac Cath:      Labs:  HEM:  Recent Labs   Lab 10/28/19  1141 10/29/19  0745 10/30/19  0518 11/01/19  0956 11/03/19  0603   WBC 10.2 6.5 5.6 4.3 9.2   HGB 13.0 11.3* 10.6* 11.0* 10.6*   .0 310.0 284.0 317.0 337.0

## 2019-11-03 NOTE — PROGRESS NOTES
[facility]  Progress Note    Filiberto Gutierrez Patient Status:  Inpatient    1945 MRN [id number]   [facility] 7NE-A Attending Gaston Fung MD   [facility] Day # 6 PCP Omar Montes MD     Subjective:   The patient is resting comfortably i last 3 completed shifts: In: 1000 [I.V.:1000]  Out: 4111 [Urine:1100; Emesis/NG output:200; Blood:5]  No intake/output data recorded.     Assessment  Patient Active Problem List:     Essential hypertension     Dyslipidemia     GERD (gastroesophageal reflux and examined. States her pain is improved today. No flatus or BM. She has been out of bed. She was transferred to ICU overnight for tachycardia. She is now back in sinus rhythm. Creatinine has increased mildly.   She is getting a fluid bolus per [employer]

## 2019-11-03 NOTE — PLAN OF CARE
Assumed care at [address]. Patient A&Ox4. Patient converted to SR approx 2050. After scheduled IV lopressor given, HR dropping to 50's and BP trending down as well. Cardizem gtt stopped at approx 0000. Abd dressing c/d/i. PRN dilaudid for abd pain.   IVF a

## 2019-11-03 NOTE — PROGRESS NOTES
[facility]  Progress Note     Donn Burciaga Patient Status:  Inpatient    1945 MRN [id number]   [facility] 4NW-A Attending Loni Dao MD   [facility] Day # 6 PCP Burke Beasley MD     Chief Complaint: SBO, SHAD    S: Patient admit of 1.26 mg/dL (H)). No results for input(s): PTP, INR in the last 168 hours. No results for input(s): TROP, CK in the last 168 hours. Imaging: Imaging data reviewed in Epic.     Medications:   • hydrocortisone   Topical QID   • sodium chloride

## 2019-11-03 NOTE — CONSULTS
[facility]  Report of Consultation    Donn Burciaga Patient Status:  Inpatient    1945 MRN [id number]   [facility] 7NE-A Attending Loni Dao MD   [facility] Day # 6 PCP Burke Beasley MD     Reason for Consultation:  SHAD/CKD    His generalized or localized, unspecified site     knees   • Paroxysmal A-fib ([address] 11/11/2014    [facility]ation [place] [address] [facility] 9/20/2010   • Pneumonia, organism unspecified(486)    • Reflux esophagitis    • Right hip pain 12/17/2014    R hip pain   • S/P knee replacement 8/ Brad Millard, Ashkan Schmidt MD at [facility]   • TONSILLECTOMY     • TUBAL LIGATION       Family History   Problem Relation Age of Onset   • Cancer Father         lung   • Other (lung cancer) Father    • Hypertension Mother    • Diabetes Mother    • Pacemaker Gama sulfate (PF) 4 MG/ML injection 4 mg, 4 mg, Intravenous, Q2H PRN  •  ondansetron HCl (ZOFRAN) injection 4 mg, 4 mg, Intravenous, Q6H PRN  •  Enoxaparin Sodium (LOVENOX) 30 MG/0.3ML injection 30 mg, 30 mg, Subcutaneous, Nightly  Home Medications:  No current Creatinine (mg/dL)   Date Value   11/03/2019 1.91 (H)   11/01/2019 1.26 (H)   10/30/2019 1.46 (H)   03/26/2018 1.54 (H)         Imaging:  Reviewed    Impression:  1. SHAD/CKD - baseline Cr ~ 1.6; related to DM/HTN + remote SHAD (occurred in post op setti

## 2019-11-04 PROCEDURE — B546ZZA ULTRASONOGRAPHY OF RIGHT SUBCLAVIAN VEIN, GUIDANCE: ICD-10-PCS | Performed by: HOSPITALIST

## 2019-11-04 PROCEDURE — 05H533Z INSERTION OF INFUSION DEVICE INTO RIGHT SUBCLAVIAN VEIN, PERCUTANEOUS APPROACH: ICD-10-PCS | Performed by: HOSPITALIST

## 2019-11-04 PROCEDURE — 99233 SBSQ HOSP IP/OBS HIGH 50: CPT | Performed by: HOSPITALIST

## 2019-11-04 PROCEDURE — 99232 SBSQ HOSP IP/OBS MODERATE 35: CPT | Performed by: INTERNAL MEDICINE

## 2019-11-04 RX ORDER — MAGNESIUM SULFATE HEPTAHYDRATE 40 MG/ML
2 INJECTION, SOLUTION INTRAVENOUS ONCE
Status: COMPLETED | OUTPATIENT
Start: 2019-11-04 | End: 2019-11-04

## 2019-11-04 RX ORDER — SODIUM CHLORIDE 0.9 % (FLUSH) 0.9 %
10 SYRINGE (ML) INJECTION EVERY 12 HOURS
Status: DISCONTINUED | OUTPATIENT
Start: 2019-11-04 | End: 2019-11-10

## 2019-11-04 NOTE — PROGRESS NOTES
[facility] Cardiology Progress Note        Segundo Archer Patient Status:  Inpatient    1945 MRN [id number]   [facility] 7NE-A Attending John Begum MD   Hosp Day # 7 PCP Nani Granado MD     Subjective:  Andria Johnson Warm and dry.      Telemetry: sinus    EKG:      Echo:      Cardiac Cath:      Labs:  HEM:  Recent Labs   Lab 10/29/19  0745 10/30/19  0518 11/01/19  0956 11/03/19  0603 11/04/19  0606   WBC 6.5 5.6 4.3 9.2 12.2*   HGB 11.3* 10.6* 11.0* 10.6* 9.9*

## 2019-11-04 NOTE — PHYSICAL THERAPY NOTE
Order received for PT eval. Attempted to see Pt this am, however, Pt occupied with Midline placement. Will follow up as schedule permits.

## 2019-11-04 NOTE — PROGRESS NOTES
[facility]  Progress Note     Ady De Souza Patient Status:  Inpatient    1945 MRN [id number]   [facility] 7NE-A Attending Dav Ortiz MD   Hosp Day # 7 PCP Jesse Merino MD     Chief Complaint: SBO  S:  Still with abd pa last 168 hours. No results for input(s): TROP, CK in the last 168 hours. Imaging: Imaging data reviewed in Epic. ASSESSMENT / PLAN:   1. SBO, failed clamp trial sp ex lap with bowel resection 11/2  1. Per Sx  2. History of gastric bypass   3.  GERD- H

## 2019-11-04 NOTE — PROGRESS NOTES
[facility]  Progress Note    Brandon Barrera Patient Status:  Inpatient    1945 MRN [id number]   [facility] 7NE-A Attending Andrew Smith MD   [facility] Day # 7 PCP Yfn Echevarria MD     No acute issues    potassium chloride 40 mEq in s distress. Alert and oriented x 3. HEENT: Moist mucous membranes. EOM-I. PERRL  Neck: No lymphadenopathy. No JVD. No carotid bruits. Respiratory: Clear to auscultation bilaterally. No wheezes. No rhonchi.   Cardiovascular: S1, S2.  Regular rate and rhyth

## 2019-11-04 NOTE — PLAN OF CARE
Assumed care of pt 1900. Pt AOx4. NSR. 2LNC.  . Abdominal dressing CDI. IV dilaudid for pain relief. Hammonds intact draining yellow urine. UO low. D5. 45 infusing per protocol. Rash on bilateral abdomen. Hydrocortisone applied per order.   Will co

## 2019-11-04 NOTE — PLAN OF CARE
Pain control with dilaudid  HR control with IV lopressor  Weaned O2 to RA  Dressing removed by surgical PA  NPO with ice, no nausea  No passing gas with minimal BS, patient is belching  Hammonds cath discontinued  IV midline placed  Right arm precautions  Mon RESPIRATORY - ADULT  Goal: Achieves optimal ventilation and oxygenation  Description  INTERVENTIONS:  - Assess for changes in respiratory status  - Assess for changes in mentation and behavior  - Position to facilitate oxygenation and minimize respiratory

## 2019-11-04 NOTE — PROGRESS NOTES
[facility]  Progress Note    Yovanny Prim Patient Status:  Inpatient    1945 MRN [id number]   [facility] 7NE-A Attending Haim Kim MD   Hosp Day # 7 PCP Michel Deras MD     Subjective:  Patient sitting up in chair.   She de replacement     Hip arthritis     Arnold-Chiari deformity (HCC)     Whole body pain     BPPV (benign paroxysmal positional vertigo)     S/P  shunt     Hypothyroid     Esophagitis     Hypomagnesemia     Paroxysmal A-fib (HCC)     Adenocarcinoma of lung (H

## 2019-11-04 NOTE — OPERATIVE REPORT
[address]    PATIENT'S NAME: Sha Rowland   ATTENDING PHYSICIAN: Robert Marcos M.D.    OPERATING PHYSICIAN: Louise Ann D.O.   PATIENT ACCOUNT#:   [de-identified]    LOCATION:  [address] [place] [address]  MEDICAL RECORD #:   [id number]       DATE OF BIRTH: there were very few adhesions within the abdominal cavity with the exception of that already mentioned.   Now that we had determined that the adhesive band was the cause of the location, decision was made at this point, due to the concern regarding the [place]

## 2019-11-05 PROCEDURE — 99232 SBSQ HOSP IP/OBS MODERATE 35: CPT | Performed by: HOSPITALIST

## 2019-11-05 PROCEDURE — 3E0436Z INTRODUCTION OF NUTRITIONAL SUBSTANCE INTO CENTRAL VEIN, PERCUTANEOUS APPROACH: ICD-10-PCS | Performed by: HOSPITALIST

## 2019-11-05 PROCEDURE — 99232 SBSQ HOSP IP/OBS MODERATE 35: CPT | Performed by: INTERNAL MEDICINE

## 2019-11-05 NOTE — PLAN OF CARE
Assumed care @ 0700. Pt a/o x4, VSS. Tele irregular sinus rhythm. No acute respiratory distress noted. C/O abd pain, relieved with PRN IV Dilaudid. Pt ambulated in the hallway with walker and 1 person assist.  (-) BM, (-) Gas, hypoactive BS.   Pl schedule  Outcome: Progressing     Problem: RESPIRATORY - ADULT  Goal: Achieves optimal ventilation and oxygenation  Description  INTERVENTIONS:  - Assess for changes in respiratory status  - Assess for changes in mentation and behavior  - Position to faci

## 2019-11-05 NOTE — PROGRESS NOTES
[facility] [address] [place] [address] Cardiology Progress Note        Nisha Mares Patient Status:  Inpatient    1945 MRN [id number]   [facility] 7NE-A Attending Boni Li MD   Hosp Day # 8 PCP Ana Laura Argueta MD     Subjective:  Kelley Self Cath:      Labs:  HEM:  Recent Labs   Lab 10/30/19  0518 11/01/19  0956 11/03/19  0603 11/04/19  0606   WBC 5.6 4.3 9.2 12.2*   HGB 10.6* 11.0* 10.6* 9.9*   .0 317.0 337.0 293.0       Chem:  Recent Labs   Lab 10/30/19  0518 11/01/19  0956 11/03/19

## 2019-11-05 NOTE — CM/SW NOTE
Interdisciplinary Rounds: 11/05/19  Admitted: 10/28/2019 LOS: 8  Disciplines in attendance: charge nurse, staff nurse, CM, [address] and discharge plan reviewed.     Active issues needing resolution prior to discharge:  Await return of bowel function

## 2019-11-05 NOTE — PLAN OF CARE
Assumed care at [address]. AOx4 very pleasant. C/o abdominal pain. PRN Dilaudid given. Midline staples C/D/I. Rashes to lower abdomen; Hydrocortisone cream applied. IV fluid infusing. Ice chips offered. Up with assistance to bathroom.   Plan of care disc RESPIRATORY - ADULT  Goal: Achieves optimal ventilation and oxygenation  Description  INTERVENTIONS:  - Assess for changes in respiratory status  - Assess for changes in mentation and behavior  - Position to facilitate oxygenation and minimize respiratory

## 2019-11-05 NOTE — PHYSICAL THERAPY NOTE
PT eval order received, chart reviewed. Attempted PT eval earlier this am and  pt was showering, second attempt now and pt reports she just ambulated in hart with r/w with PCT. Pt refuses PT evaluation at this time due to fatigue.  Will check back as schedu

## 2019-11-05 NOTE — PROGRESS NOTES
[facility]  Progress Note     Matthew Fong Patient Status:  Inpatient    1945 MRN [id number]   [facility] 7NE-A Attending Alirio Blake MD   [facility] Day # 8 PCP Florinda Velásquez MD     Chief Complaint: SBO  S:  Still with abd pa [FreeTextEntry1] : She presents in scheduled followup reporting that she has been feeling well.  In [place], she complained of discomfort at the pacemaker site. Interrogation was unremarkable and she was reassured that the device would "settle in". Indeed, she is now symptom-free in this regard.\par \par Edema is fairy well controlled with the use of compry.\par \par No c/o chest, throat,jaw, arm or upper back discomfort.  No dyspnea, orthopnea or PND.  No Palpitations, dizziness or syncope.  No claudication.\par \par Echo in [place] was reportedly normal.\par \par No anticoagulation related bleeding problems.\par  activity   2. History of gastric bypass   3. GERD- H2B   4. Oliguria/SHAD  1. IVF  2. Renal following  5. Essential hypertension  6. Paroxysmal atrial fibrillation sp ablation > in flutter 11/2 > converted to NSR  1. Metoprolol IV   2.  Cardiology following

## 2019-11-05 NOTE — PROGRESS NOTES
[facility]  Progress Note    [facility] Patient Status:  Inpatient    1945 MRN [id number]   [facility] 7NE-A Attending Stacie Prakash MD   Hosp Day # 8 PCP Brent Spaulding MD     Subjective:  Patient very frustrated with lack of paroxysmal positional vertigo)     S/P  shunt     Hypothyroid     Esophagitis     Hypomagnesemia     Paroxysmal A-fib (HCC)     Adenocarcinoma of lung (HCC)     Cataract     Post-thoracotomy pain     Fibromyalgia     Normocytic normochromic anemia     Ti activity.   Encourage ambulation  8:26 AM

## 2019-11-05 NOTE — DIETARY NOTE
[facility]    NUTRITION ASSESSMENT    Pt does not meet malnutrition criteria.     NUTRITION DIAGNOSIS/PROBLEM:    Inadequate oral intake related to physiological causes as evidenced by inability to consume PO; estimated intake less than estimated needs FINDINGS:     1. Body Fat/Muscle Mass: not assessed per visual exam.     2. Fluid Accumulation: lower extremity edema per visual exam.    NUTRITION PRESCRIPTION:  Calories: 9400-6633 calories/day (17-20 calories per kg)  Protein:  grams protein/day (

## 2019-11-05 NOTE — PROGRESS NOTES
[facility]  Progress Note    Diane Sandoval Patient Status:  Inpatient    1945 MRN [id number]   [facility] 7NE-A Attending Sarah Bishop MD   [facility] Day # 8 PCP Lidya Lockhart MD     No acute issues  Feels better today      Normal S No rhonchi. Cardiovascular: S1, S2.  Regular rate and rhythm. No murmurs. Equal pulses   Abdomen: Soft, incision/dressing clean and intact; tender to palp  Neurologic: No focal neurological deficits.    Musculoskeletal: Full range of motion of all extremi

## 2019-11-06 ENCOUNTER — APPOINTMENT (OUTPATIENT)
Dept: GENERAL RADIOLOGY | Facility: HOSPITAL | Age: 74
DRG: 330 | End: 2019-11-06
Attending: PHYSICIAN ASSISTANT
Payer: MEDICARE

## 2019-11-06 PROCEDURE — 74018 RADEX ABDOMEN 1 VIEW: CPT | Performed by: PHYSICIAN ASSISTANT

## 2019-11-06 PROCEDURE — 99232 SBSQ HOSP IP/OBS MODERATE 35: CPT | Performed by: HOSPITALIST

## 2019-11-06 PROCEDURE — 99232 SBSQ HOSP IP/OBS MODERATE 35: CPT | Performed by: INTERNAL MEDICINE

## 2019-11-06 RX ORDER — HYDRALAZINE HYDROCHLORIDE 20 MG/ML
5 INJECTION INTRAMUSCULAR; INTRAVENOUS EVERY 4 HOURS PRN
Status: DISCONTINUED | OUTPATIENT
Start: 2019-11-06 | End: 2019-11-10

## 2019-11-06 NOTE — PROGRESS NOTES
[facility] Cardiology Progress Note        [facility] Patient Status:  Inpatient    1945 MRN [id number]   [facility] 7NE-A Attending Izabela Ivan MD   [facility] Day # 9 PCP Brent Spaulding MD     Subjective:  Doni Roblero dry.     Telemetry: sinus    EKG:      Echo:      Cardiac Cath:      Labs:  HEM:  Recent Labs   Lab 11/01/19  0956 11/03/19  0603 11/04/19  0606   WBC 4.3 9.2 12.2*   HGB 11.0* 10.6* 9.9*   .0 337.0 293.0       Chem:  Recent Labs   Lab 11/01/19  095

## 2019-11-06 NOTE — PROGRESS NOTES
[facility]  Progress Note     Constantinooneal Elenaal Patient Status:  Inpatient    1945 MRN [id number]   [facility] 7NE-A Attending Jessie Pruitt MD   [facility] Day # 9 PCP Jimmy Mcnally MD     Chief Complaint: SBO  S:  Still with abd pa bowel resection 11/2  1. Per Sx  2. Cont NPO as no flatus or BM  3. Increase activity   2. History of gastric bypass   3. GERD- H2B   4. Oliguria/SHAD  1. IVF  2. Renal following  5. Essential hypertension  6.  Paroxysmal atrial fibrillation sp ablation > in

## 2019-11-06 NOTE — PLAN OF CARE
Assumed care at [address]. AOx4. C/o abdominal incision site pain. PRN Dilaudid given. PRN Benadryl given for itchiness. Midline incision staples C/D/I ABHAY. Hypoactive bowel sounds noted to right sided of abdomen. PPN started. PT to see today.   Plan of Problem: RESPIRATORY - ADULT  Goal: Achieves optimal ventilation and oxygenation  Description  INTERVENTIONS:  - Assess for changes in respiratory status  - Assess for changes in mentation and behavior  - Position to facilitate oxygenation and minimize r

## 2019-11-06 NOTE — PLAN OF CARE
Assumed care @0730  VSS, A&Ox4, RA  NSR with PVC's and PAC's, cramping  pain in abdomen,. KUB ordered. Hypoactive bowel sounds, no gas. Urinating w/d/l. Up with 1 assist and walker. PPN running @75ml/h.  2 cups ice chips daily.     Incisions  midline Encourage toileting schedule  Outcome: Progressing     Problem: RESPIRATORY - ADULT  Goal: Achieves optimal ventilation and oxygenation  Description  INTERVENTIONS:  - Assess for changes in respiratory status  - Assess for changes in mentation and behavior

## 2019-11-06 NOTE — PROGRESS NOTES
[facility]  Progress Note    Kendell Stoddard Patient Status:  Inpatient    1945 MRN [id number]   [facility] 7NE-A Attending Joaquín Calles MD   [facility] Day # 9 PCP Margie Medina MD     Subjective:  Patient continues to be very frustra knee replacement     Hip arthritis     Arnold-Chiari deformity (HCC)     Whole body pain     BPPV (benign paroxysmal positional vertigo)     S/P  shunt     Hypothyroid     Esophagitis     Hypomagnesemia     Paroxysmal A-fib ([address]     Adenocarcinoma of samantha

## 2019-11-06 NOTE — PHYSICAL THERAPY NOTE
Attempted to see pt for physical therapy at this time, but the pt is off the unit for KUDWAYNE. Will plan to re-attempt to see pt again as she is available.

## 2019-11-06 NOTE — DIETARY NOTE
Clinical Nutrition - TPN      PPN Bag #2 to start tonight. PPN tonight to provide 780 NPC (490 dextrose calories, 290 lipid calories), 30% lipids, 50 grams protein in 1800 ml fluid to meet ~50% pt calorie & protein needs.   Continue accuchecks q6 - BS look

## 2019-11-06 NOTE — PROGRESS NOTES
[facility]  Progress Note    Segundo Archer Patient Status:  Inpatient    1945 MRN [id number]   [facility] 7NE-A Attending John Begum MD   [facility] Day # 9 PCP Nani Granado MD     No acute issues  Still no flatus/BM  Pain is cont (96.6 kg), SpO2 98 %. General: No acute distress. Alert and oriented x 3. HEENT: Moist mucous membranes. EOM-I. PERRL  Neck: No lymphadenopathy. No JVD. No carotid bruits. Respiratory: Clear to auscultation bilaterally. No wheezes. No rhonchi.   Cardio

## 2019-11-07 ENCOUNTER — APPOINTMENT (OUTPATIENT)
Dept: GENERAL RADIOLOGY | Facility: HOSPITAL | Age: 74
DRG: 330 | End: 2019-11-07
Attending: SURGERY
Payer: MEDICARE

## 2019-11-07 PROCEDURE — 74018 RADEX ABDOMEN 1 VIEW: CPT | Performed by: SURGERY

## 2019-11-07 PROCEDURE — 99232 SBSQ HOSP IP/OBS MODERATE 35: CPT | Performed by: HOSPITALIST

## 2019-11-07 PROCEDURE — 99232 SBSQ HOSP IP/OBS MODERATE 35: CPT | Performed by: INTERNAL MEDICINE

## 2019-11-07 RX ORDER — ENOXAPARIN SODIUM 100 MG/ML
40 INJECTION SUBCUTANEOUS NIGHTLY
Status: DISCONTINUED | OUTPATIENT
Start: 2019-11-07 | End: 2019-11-10

## 2019-11-07 RX ORDER — FUROSEMIDE 10 MG/ML
20 INJECTION INTRAMUSCULAR; INTRAVENOUS ONCE
Status: COMPLETED | OUTPATIENT
Start: 2019-11-07 | End: 2019-11-07

## 2019-11-07 NOTE — PLAN OF CARE
Received pt around 2300   A/OX4, RA, VSS  C/o abd cramping/ pain  Pt reports passing gas this am  Needs attended to, Will cont to monior

## 2019-11-07 NOTE — PROGRESS NOTES
[facility]  Progress Note    Meliza Dawson Patient Status:  Inpatient    1945 MRN [id number]   [facility] 7NE-A Attending Malini Bishop MD   Hosp Day # 8 PCP Deana Rico MD     No acute issues  No flatus/bm  Pt frustrated; re x 3.  HEENT: Moist mucous membranes. EOM-I. PERRL  Neck: No lymphadenopathy. No JVD. No carotid bruits. Respiratory: Clear to auscultation bilaterally. No wheezes. No rhonchi. Cardiovascular: S1, S2.  Regular rate and rhythm. No murmurs.  Equal pulses

## 2019-11-07 NOTE — PLAN OF CARE
Assumed care at [address] [facility]. Pt a/ox4. VSS. NSR w/pac's per tele. RA. Dilaudid iv prn given for pain w/ relief. Hypoactive BS. Abd incision w/ staples, romeo, c/d/i. PPN infusing per order. Up to bathroom x1 assist w/ walker. Pt updated w/ POC.   Will continu

## 2019-11-07 NOTE — PHYSICAL THERAPY NOTE
PHYSICAL THERAPY EVALUATION - INPATIENT     Room Number: 2693/6672-X  Evaluation Date: 11/7/2019  Type of Evaluation: Initial  Physician Order: PT Eval and Treat    Presenting Problem: SBO, s/p exp lap with resection, JO ANN 11/2  Reason for Therapy:  Kristian Eli criss chang's   • Neuropathy     both legs/ mobility loss    • Osteoarthrosis, unspecified whether generalized or localized, unspecified site     knees   • Paroxysmal A-fib ([facility] 11/11/2014    Ablation [place] [address] [facility] 9/20/2010   • Pneumonia, organism unspecified THORACOSCOPY/VATS Left 10/29/2014    Performed by Brad Millard, Akash Garces MD at [address] Left 10/29/2014    Performed by Brad Millard, Akash Garces MD at [facility] [facility] Left 10/29/2014    Performed by Brad Millard, Akash Garces MD at [facility] on the side of the bed?: A Lot   How much help from another person does the patient currently need. ..   -   Moving to and from a bed to a chair (including a wheelchair)?: A Little   -   Need to walk in hospital room?: A Little   -   Climbing 3-5 steps with to address the above deficits to assist patient in returning to prior to level of function. Subacute rehab is recommended for 7-10 days. After this period of rehabilitation patient should achieve Mod I level in bed mobility, transfers, ambulation with RW .

## 2019-11-07 NOTE — PROGRESS NOTES
[facility]  Progress Note     Salina Solorzano Patient Status:  Inpatient    1945 MRN [id number]   [facility] 7NE-A Attending Vesta Petersen MD   Hosp Day # 10 PCP Raji Lagunas MD     Chief Complaint: SBO  S:  +flatus. No BM. Imaging: Imaging data reviewed in Epic. ASSESSMENT / PLAN:   1. SBO sp ex lap with bowel resection 11/2- now with +flatus. 1. Per Sx  2. Cont NPO as no flatus or BM  3. Repeat xray   4. Increase activity   2. History of gastric bypass   3.  GERD- H2B

## 2019-11-07 NOTE — PROGRESS NOTES
[facility] Cardiology Progress Note        Andrew Last Patient Status:  Inpatient    1945 MRN [id number]   [facility] 7NE-A Attending Moody Ureña MD   Hosp Day # 10 PCP Ani Wang MD     Subjective:  [place] Search sinus    EKG:      Echo:      Cardiac Cath:      Labs:  HEM:  Recent Labs   Lab 11/01/19  0956 11/03/19  0603 11/04/19  0606   WBC 4.3 9.2 12.2*   HGB 11.0* 10.6* 9.9*   .0 337.0 293.0       Chem:  Recent Labs   Lab 11/03/19  0603 11/04/19  0606 11/

## 2019-11-07 NOTE — PROGRESS NOTES
[facility]  Progress Note    Merry Zavala Patient Status:  Inpatient    1945 MRN [id number]   [facility] 7NE-A Attending Sabina Peter MD   Hosp Day # 10 PCP Abdullahi Kirk MD     Subjective:  Patient stated to me this a.m. no f (gastroesophageal reflux disease)     H/O gastric bypass     Arthritis of knee, left     1st degree AV block     H/O spina bifida     S/P knee replacement     Hip arthritis     Arnold-Chiari deformity (HCC)     Whole body pain     BPPV (benign paroxysmal p

## 2019-11-07 NOTE — PROGRESS NOTES
[facility] Pharmacy Note:  Renal Dose Adjustment for Enoxaparin (LOVENOX)    Montse Neves has been prescribed Enoxaparin (LOVENOX) 30 mg subcutaneously every 24 hours. Estimated Creatinine Clearance: 39.4 mL/min (A) (based on SCr of 1.05 mg/dL (H)).     Her c

## 2019-11-07 NOTE — OCCUPATIONAL THERAPY NOTE
OCCUPATIONAL THERAPY EVALUATION - INPATIENT     Room Number: 2982/3573-D  Evaluation Date: 11/7/2019  Type of Evaluation: Initial  Presenting Problem: SBO, 11/2 s/p bowel resection    Physician Order: IP Consult to Occupational Therapy  Reason for Therapy: hypertension    • Gallstones 8/5/2016   • H/O gastric bypass 2012   • H/O gastric bypass    • H/O spina bifida 1993    chairi malform spina bifida s/p surgery   • Hearing loss in left ear 8/5/2016   • HIGH BLOOD PRESSURE    • History of blood transfusion BYPASS FOR MORBID  2012    bowel obstructions complication needing x3 more surgeries   • NEEDLE BIOPSY RIGHT  ? ?   • OTHER SURGICAL HISTORY  1993    craniotomy chiari repair flo   • OTHER SURGICAL HISTORY  1990    spine repair spina bifida   • REMOVAL ACTIVITY TOLERANCE                         O2 SATURATIONS                ACTIVITIES OF DAILY LIVING ASSESSMENT  AM-PAC ‘6-Clicks’ Inpatient Daily Activity Short Form  How much help from another person does the patient currently need…  -   Putting above. Functional outcome measures completed include AM PAC. In this OT evaluation patient presents with the following performance deficits: decreased trunk mobility, pain in abdomen, decreased standing balance, and decreased activity endurance.  These defi supervision    Functional Transfer Goals  Patient will transfer to toilet:  with supervision

## 2019-11-07 NOTE — PLAN OF CARE
Assumed care @0730  VSS, A&Ox4, RA  NSR with PVC's and PAC's, cramping  pain in abdomen,.  dilaudid given for pain with relief. Hypoactive bowel sounds, positive for gas this morning. Urinating w/d/l. Lasix given x1. Up with 1 assist and walker.   PPN appropriate  - Consider OT/PT consult to assist with strengthening/mobility  - Encourage toileting schedule  Outcome: Progressing     Problem: RESPIRATORY - ADULT  Goal: Achieves optimal ventilation and oxygenation  Description  INTERVENTIONS:  - Assess fo Impaired Functional Mobility  Goal: Achieve highest/safest level of mobility/gait  Description  Interventions:  - Assess patient's functional ability and stability  - Promote increasing activity/tolerance for mobility and gait  - Educate and engage patient

## 2019-11-07 NOTE — DIETARY NOTE
Clinical Nutrition - TPN      PPN Bag #3 to start tonight. PPN tonight to provide 800 NPC (500 dextrose calories, 300 lipid calories), 30% lipids, 50 grams protein in 1800 ml fluid to meet ~50% pt calorie & protein needs.   Continue accuchecks q6 - BS ulysses

## 2019-11-08 ENCOUNTER — APPOINTMENT (OUTPATIENT)
Dept: ULTRASOUND IMAGING | Facility: HOSPITAL | Age: 74
DRG: 330 | End: 2019-11-08
Attending: HOSPITALIST
Payer: MEDICARE

## 2019-11-08 ENCOUNTER — TELEPHONE (OUTPATIENT)
Dept: FAMILY MEDICINE CLINIC | Facility: CLINIC | Age: 74
End: 2019-11-08

## 2019-11-08 PROCEDURE — 05H633Z INSERTION OF INFUSION DEVICE INTO LEFT SUBCLAVIAN VEIN, PERCUTANEOUS APPROACH: ICD-10-PCS | Performed by: HOSPITALIST

## 2019-11-08 PROCEDURE — B547ZZA ULTRASONOGRAPHY OF LEFT SUBCLAVIAN VEIN, GUIDANCE: ICD-10-PCS | Performed by: HOSPITALIST

## 2019-11-08 PROCEDURE — 99232 SBSQ HOSP IP/OBS MODERATE 35: CPT | Performed by: HOSPITALIST

## 2019-11-08 PROCEDURE — 99232 SBSQ HOSP IP/OBS MODERATE 35: CPT | Performed by: INTERNAL MEDICINE

## 2019-11-08 PROCEDURE — 93971 EXTREMITY STUDY: CPT | Performed by: HOSPITALIST

## 2019-11-08 RX ORDER — FUROSEMIDE 10 MG/ML
20 INJECTION INTRAMUSCULAR; INTRAVENOUS ONCE
Status: COMPLETED | OUTPATIENT
Start: 2019-11-08 | End: 2019-11-08

## 2019-11-08 RX ORDER — LOSARTAN POTASSIUM 100 MG/1
100 TABLET ORAL DAILY
Status: DISCONTINUED | OUTPATIENT
Start: 2019-11-08 | End: 2019-11-10

## 2019-11-08 RX ORDER — FUROSEMIDE 20 MG/1
20 TABLET ORAL DAILY
Status: DISCONTINUED | OUTPATIENT
Start: 2019-11-08 | End: 2019-11-10

## 2019-11-08 RX ORDER — SODIUM CHLORIDE 0.9 % (FLUSH) 0.9 %
10 SYRINGE (ML) INJECTION EVERY 12 HOURS
Status: DISCONTINUED | OUTPATIENT
Start: 2019-11-08 | End: 2019-11-10

## 2019-11-08 NOTE — PROGRESS NOTES
[facility]  Progress Note    Melvina Walls Patient Status:  Inpatient    1945 MRN [id number]   [facility] 7NE-A Attending Marco Cardoza MD   [address] Day # 6 PCP Yaima Perez MD     Subjective:  Patient is having active flatus.   Gwendolyn Barber reflux disease)     H/O gastric bypass     Arthritis of knee, left     1st degree AV block     H/O spina bifida     S/P knee replacement     Hip arthritis     Arnold-Chiari deformity (HCC)     Whole body pain     BPPV (benign paroxysmal positional vertigo)

## 2019-11-08 NOTE — PROGRESS NOTES
[facility]  Progress Note    Graham Leventhal Patient Status:  Inpatient    1945 MRN [id number]   [facility] 7NE-A Attending Walter Knott MD   [facility] Day # 6 PCP Jimmy Mcnally MD     No acute issues  + flatus          adult 3 in 1 PERRL  Neck: No lymphadenopathy. No JVD. No carotid bruits. Respiratory: Clear to auscultation bilaterally. No wheezes. No rhonchi. Cardiovascular: S1, S2.  Regular rate and rhythm. No murmurs.  Equal pulses   Abdomen: Soft, incision/dressing clean and

## 2019-11-08 NOTE — TELEPHONE ENCOUNTER
Received call from Mya [address] with [facility] who states pt will be discharged from [facility] post op 'likely this weekend', but no official discharge date has been given. Yadira states Dr. Nunu Graham will be patient's new primary physician.      Kathryn Caldera

## 2019-11-08 NOTE — PROGRESS NOTES
[facility]  Progress Note     Kendell Stoddard Patient Status:  Inpatient    1945 MRN [id number]   [facility] 7NE-A Attending Joaquín Calles MD   [facility] Day # 6 PCP Margie Medina MD     Chief Complaint: SBO  S:  +flatus but slow 2. History of gastric bypass   3. GERD- H2B   4. Oliguria/SHAD- improved  1. IVF, Renal following  5. Essential hypertension  6. Paroxysmal atrial fibrillation sp ablation > in flutter 11/2 > converted to NSR  1. Metoprolol IV, Cardiology following   7.  H

## 2019-11-08 NOTE — PLAN OF CARE
Pt is alert and oriented x4, NSR, on RA. Tolerating sips of clears. PPN infusing. Abd soft/nondistended, hypoactive BS, +gas, no BM. Dilaudid q2h for pain given. Pt c/o itching, benadryl and hydrocortisone cream given. Pt ambulating in halls.  Encouraged am

## 2019-11-08 NOTE — PLAN OF CARE
A/OX4, RA, VSS, SR per Tele  C/o abd pain/ cramping, managed w/ PRN dilaudid  Active BS, (-)gas, (-)BM, (+)burping   Pt c/o itching allover body, managed w/ benadryl and hydrocortisone cream  Pt up voiding to [facility]  Needs attended to, Will cont to monitor.

## 2019-11-08 NOTE — PROGRESS NOTES
[facility] Cardiology Progress Note        [facility] Patient Status:  Inpatient    1945 MRN [id number]   [facility] 7NE-A Attending Izabela Ivan,  [facility] Se Day # 11 PCP Brent Spaulding MD     Subjective:  Karn Epley sinus    EKG:      Echo:      Cardiac Cath:      Labs:  HEM:  Recent Labs   Lab 11/01/19  0956 11/03/19  0603 11/04/19  0606   WBC 4.3 9.2 12.2*   HGB 11.0* 10.6* 9.9*   .0 337.0 293.0       Chem:  Recent Labs   Lab 11/04/19  0606 11/05/19  0610 11/

## 2019-11-08 NOTE — CM/SW NOTE
MSW met with the patient at bedside to discuss recommendation for [facility]. The patient declined stating that she will be returning home with her spouse. She is agreeable to [facility]  P:[phone]  F:[phone]. Referral made.   Liaison

## 2019-11-08 NOTE — HOME CARE LIAISON
Received referral from LATISHA/Arm[facility]. Met with patient at the bedside and provided choice. Patient is agreeable to Pending sale to [facility], pending orders. Residential brochure provided with contact information. All questions addressed and answered.

## 2019-11-08 NOTE — PLAN OF CARE
Pt RUE very swollen, red , and hot. hospitalist notfied. PICC RN paged for new midline. US ordered to r/u DVT.

## 2019-11-08 NOTE — DIETARY NOTE
[facility]    NUTRITION ASSESSMENT    Pt does not meet malnutrition criteria.     NUTRITION DIAGNOSIS/PROBLEM:    Inadequate oral intake related to physiological causes as evidenced by inability to consume PO; estimated intake less than estimated needs Addresses: Yes    NUTRITION RELATED PHYSICAL FINDINGS:     1. Body Fat/Muscle Mass: not assessed per visual exam.     2. Fluid Accumulation: lower extremity edema per visual exam.    NUTRITION PRESCRIPTION:  Calories: 2099-3816 calories/day (17-20 calories

## 2019-11-09 PROCEDURE — 99233 SBSQ HOSP IP/OBS HIGH 50: CPT | Performed by: HOSPITALIST

## 2019-11-09 RX ORDER — FUROSEMIDE 10 MG/ML
20 INJECTION INTRAMUSCULAR; INTRAVENOUS ONCE
Status: COMPLETED | OUTPATIENT
Start: 2019-11-09 | End: 2019-11-09

## 2019-11-09 RX ORDER — FAMOTIDINE 20 MG/1
20 TABLET ORAL 2 TIMES DAILY
Status: DISCONTINUED | OUTPATIENT
Start: 2019-11-09 | End: 2019-11-09 | Stop reason: DRUGHIGH

## 2019-11-09 RX ORDER — FAMOTIDINE 20 MG/1
20 TABLET ORAL DAILY
Status: DISCONTINUED | OUTPATIENT
Start: 2019-11-09 | End: 2019-11-10

## 2019-11-09 RX ORDER — LEVOTHYROXINE SODIUM 0.05 MG/1
50 TABLET ORAL
Status: DISCONTINUED | OUTPATIENT
Start: 2019-11-09 | End: 2019-11-10

## 2019-11-09 RX ORDER — METOPROLOL SUCCINATE 100 MG/1
100 TABLET, EXTENDED RELEASE ORAL
Status: DISCONTINUED | OUTPATIENT
Start: 2019-11-09 | End: 2019-11-10

## 2019-11-09 NOTE — PROGRESS NOTES
[facility]  Progress Note     Cevalloskeith Bermudez Patient Status:  Inpatient    1945 MRN [id number]   [facility] 7NE-A Attending Valarie Diana MD   [address] Day # 15 PCP Martita Pappas MD     Chief Complaint: SBO  S:  +flatus and tole 4. Oliguria/SHAD- improved  1. IVF, Renal following  5. RUE swelling- doppler neg   6. Essential hypertension  7. Paroxysmal atrial fibrillation sp ablation > in flutter 11/2 > converted to NSR  1. Switch to PO Metoprolol, Cardiology following   8.  Hypoth

## 2019-11-09 NOTE — PLAN OF CARE
Pt is alert and oriented x4. NSR, on room air. Benadryl given for itching, dilaudid given for pain. Pt advanced to full liquid diet, tolerating, but little intake. Incision clean/dry, some redness around staples. Oral meds resumed. Pt denies nausea.  Abd so

## 2019-11-09 NOTE — PLAN OF CARE
Pt c/o abd cramping. Medicated w/ Dilaudid and Tylenol w/ improvement. Mid abd incision w/ staples romeo. Pt reports passing gas and belching. Denies any n/v. Tolerating cld. PPN completed overnight. VSS. Afebrile. NSR on tele. Up brp w/ walker and sba.  Trent Neri mobility/gait  Description  Interventions:  - Assess patient's functional ability and stability  - Promote increasing activity/tolerance for mobility and gait  - Educate and engage patient/family in tolerated activity level and precautions  - Recommend use

## 2019-11-09 NOTE — CONSULTS
[facility] Pharmacy Note:  Renal Dose Adjustment    Yovanny Fuller has been prescribed famotidine (PEPCID) 20 mg orally every 12 hours. Estimated Creatinine Clearance: 33.9 mL/min (A) (based on SCr of 1.22 mg/dL (H)).     Her calculated creatinine clearance is

## 2019-11-09 NOTE — PROGRESS NOTES
[facility] Cardiology Progress Note        Johnbrittney Ced Patient Status:  Inpatient    1945 MRN [id number]   [facility] 7NE-A Attending Dalila Ahumada, MD   Hosp Day # 12 PCP Domenico Corbett MD     Subjective:  Samira Rivera murmur. Lungs: CTA  Abdomen:  post op. Extremities: 1++ edema  Neurologic: no focal deficits  Skin: Warm and dry.      Telemetry: sinus    EKG:      Echo:      Cardiac Cath:      Labs:  HEM:  Recent Labs   Lab 11/03/19  0603 11/04/19  0606   WBC 9.2 12.2*

## 2019-11-09 NOTE — PROGRESS NOTES
[facility]  Progress Note    Brandon Barrera Patient Status:  Inpatient    1945 MRN [id number]   [facility] 7NE-A Attending Dominick Danielson MD   [facility] Day # 12 PCP Yfn Echevarria MD     Subjective:  Patient is tolerating a clear liqui gastric bypass     Arthritis of knee, left     1st degree AV block     H/O spina bifida     S/P knee replacement     Hip arthritis     Arnold-Chiari deformity (HCC)     Whole body pain     BPPV (benign paroxysmal positional vertigo)     S/P  shunt     Hy

## 2019-11-10 VITALS
SYSTOLIC BLOOD PRESSURE: 131 MMHG | HEIGHT: 64 IN | BODY MASS INDEX: 35.66 KG/M2 | HEART RATE: 82 BPM | OXYGEN SATURATION: 96 % | DIASTOLIC BLOOD PRESSURE: 50 MMHG | RESPIRATION RATE: 16 BRPM | WEIGHT: 208.88 LBS | TEMPERATURE: 98 F

## 2019-11-10 PROCEDURE — 99239 HOSP IP/OBS DSCHRG MGMT >30: CPT | Performed by: HOSPITALIST

## 2019-11-10 RX ORDER — HYDROCODONE BITARTRATE AND ACETAMINOPHEN 5; 325 MG/1; MG/1
1 TABLET ORAL EVERY 6 HOURS PRN
Qty: 20 TABLET | Refills: 0 | Status: SHIPPED | OUTPATIENT
Start: 2019-11-10 | End: 2021-07-07 | Stop reason: SDUPTHER

## 2019-11-10 RX ORDER — ASPIRIN 81 MG/1
81 TABLET, CHEWABLE ORAL DAILY
Status: DISCONTINUED | OUTPATIENT
Start: 2019-11-10 | End: 2019-11-10

## 2019-11-10 RX ORDER — AMLODIPINE BESYLATE 5 MG/1
5 TABLET ORAL DAILY
Status: DISCONTINUED | OUTPATIENT
Start: 2019-11-10 | End: 2019-11-10

## 2019-11-10 NOTE — PLAN OF CARE
Patient discharged to home via wc and support staff. IV and tele removed. DC instructions and follow up appt's reviewed. Pt verbalizes understanding. Seanor script sent to [address] [place] [address], pt and her  aware to pickup. All belongins sent w/ pt.

## 2019-11-10 NOTE — PROGRESS NOTES
[facility] Cardiology Progress Note        Morroelvia Larseninas Patient Status:  Inpatient    1945 MRN [id number]   [facility] 7NE-A Attending Pauline Cehn,  [facility] Se Day # 15 PCP Maged Boucher MD     Subjective:  Daysi Ann murmur murmur. Lungs: CTA  Abdomen:  post op. Extremities: trace  Edema  Limited R arm motion  Neurologic: no focal deficits  Skin: Warm and dry.      Telemetry: sinus occ PAC's           Labs:  HEM:  Recent Labs   Lab 11/04/19  0606   WBC 12.2*   HGB 9.9

## 2019-11-10 NOTE — PLAN OF CARE
Assumed care 1900. Patient complaining of pain in right side of abdomen. Dilaudid given. Patient complaining of itchiness. Benadryl given and hydrocortisone cream applied to red rash on abdomen. Midline incision with staples open to air.  Redness noted

## 2019-11-10 NOTE — PLAN OF CARE
Problem: PAIN - ADULT  Goal: Verbalizes/displays adequate comfort level or patient's stated pain goal  Description  INTERVENTIONS:  - Encourage pt to monitor pain and request assistance  - Assess pain using appropriate pain scale  - Administer analgesics Oxygen supplementation based on oxygen saturation or ABGs  - Provide Smoking Cessation handout, if applicable  - Encourage broncho-pulmonary hygiene including cough, deep breathe, Incentive Spirometry  - Assess the need for suctioning and perform as needed for Discharge

## 2019-11-10 NOTE — PROGRESS NOTES
[facility]  Progress Note    Melvina Walls Patient Status:  Inpatient    1945 MRN [id number]   [facility] 7NE-A Attending Marco Cardoza MD   [address] Day # 15 PCP Yaima Perez MD     Subjective:  Patient feeling well, eager for dis AV block     H/O spina bifida     S/P knee replacement     Hip arthritis     Arnold-Chiari deformity (HCC)     Whole body pain     BPPV (benign paroxysmal positional vertigo)     S/P  shunt     Hypothyroid     Esophagitis     Hypomagnesemia     Paroxysma

## 2019-11-11 ENCOUNTER — PATIENT OUTREACH (OUTPATIENT)
Dept: CASE MANAGEMENT | Age: 74
End: 2019-11-11

## 2019-11-11 DIAGNOSIS — Z02.9 ENCOUNTERS FOR UNSPECIFIED ADMINISTRATIVE PURPOSE: ICD-10-CM

## 2019-11-12 ENCOUNTER — TELEPHONE (OUTPATIENT)
Dept: SURGERY | Facility: CLINIC | Age: 74
End: 2019-11-12

## 2019-11-12 NOTE — PROGRESS NOTES
Harlan [phone] for post hospital follow up, [facility] contact information provided.

## 2019-11-12 NOTE — TELEPHONE ENCOUNTER
Home health RN Vincenzo Arroyo phoned office, states pt has delayed home health care until Friday. Pt has doctor appt everyday, and would like to delay home health.

## 2019-11-13 ENCOUNTER — OFFICE VISIT (OUTPATIENT)
Dept: SURGERY | Facility: CLINIC | Age: 74
End: 2019-11-13

## 2019-11-13 VITALS
HEART RATE: 77 BPM | HEIGHT: 64 IN | DIASTOLIC BLOOD PRESSURE: 75 MMHG | SYSTOLIC BLOOD PRESSURE: 148 MMHG | BODY MASS INDEX: 33.46 KG/M2 | TEMPERATURE: 99 F | WEIGHT: 196 LBS

## 2019-11-13 DIAGNOSIS — K56.609 SMALL BOWEL OBSTRUCTION (HCC): Primary | ICD-10-CM

## 2019-11-13 PROCEDURE — 99024 POSTOP FOLLOW-UP VISIT: CPT | Performed by: SURGERY

## 2019-11-14 ENCOUNTER — OFFICE VISIT (OUTPATIENT)
Dept: INTERNAL MEDICINE CLINIC | Facility: CLINIC | Age: 74
End: 2019-11-14
Payer: MEDICARE

## 2019-11-14 VITALS
TEMPERATURE: 98 F | RESPIRATION RATE: 18 BRPM | DIASTOLIC BLOOD PRESSURE: 70 MMHG | WEIGHT: 194 LBS | HEART RATE: 61 BPM | SYSTOLIC BLOOD PRESSURE: 122 MMHG | HEIGHT: 64 IN | BODY MASS INDEX: 33.12 KG/M2 | OXYGEN SATURATION: 96 %

## 2019-11-14 DIAGNOSIS — Z98.890 S/P EXPLORATORY LAPAROTOMY: ICD-10-CM

## 2019-11-14 DIAGNOSIS — C34.90 ADENOCARCINOMA OF LUNG, UNSPECIFIED LATERALITY (HCC): ICD-10-CM

## 2019-11-14 DIAGNOSIS — N18.30 CKD (CHRONIC KIDNEY DISEASE) STAGE 3, GFR 30-59 ML/MIN (HCC): ICD-10-CM

## 2019-11-14 DIAGNOSIS — E03.8 OTHER SPECIFIED HYPOTHYROIDISM: ICD-10-CM

## 2019-11-14 DIAGNOSIS — Z98.84 H/O GASTRIC BYPASS: ICD-10-CM

## 2019-11-14 DIAGNOSIS — I35.1 NONRHEUMATIC AORTIC VALVE INSUFFICIENCY: ICD-10-CM

## 2019-11-14 DIAGNOSIS — K21.9 GASTROESOPHAGEAL REFLUX DISEASE WITHOUT ESOPHAGITIS: ICD-10-CM

## 2019-11-14 DIAGNOSIS — I10 ESSENTIAL HYPERTENSION: ICD-10-CM

## 2019-11-14 DIAGNOSIS — I48.0 PAROXYSMAL A-FIB (HCC): ICD-10-CM

## 2019-11-14 DIAGNOSIS — K56.609 SMALL BOWEL OBSTRUCTION (HCC): Primary | ICD-10-CM

## 2019-11-14 PROCEDURE — 1111F DSCHRG MED/CURRENT MED MERGE: CPT | Performed by: CLINICAL NURSE SPECIALIST

## 2019-11-14 PROCEDURE — 99495 TRANSJ CARE MGMT MOD F2F 14D: CPT | Performed by: CLINICAL NURSE SPECIALIST

## 2019-11-14 RX ORDER — CALCITRIOL 0.25 UG/1
0.25 CAPSULE, LIQUID FILLED ORAL DAILY
COMMUNITY

## 2019-11-14 NOTE — PROGRESS NOTES
[facility]  Progress Note    Shraddha Foley Patient Status:  No patient class for patient encounter    1945 MRN [id number]   Location [address] [place] [address], [address] [place] [address] Attending No att. providers found   Hosp Day # 0 PCP Marlen Cole of both ears     Gallstones     Hearing loss in left ear     Chronic pain of both shoulders     CKD (chronic kidney disease) stage 3, GFR 30-59 ml/min ([facility])     Iron deficiency anemia     Diarrhea due to malabsorption     Leg swelling     Nonrheumatic aort

## 2019-11-14 NOTE — PROGRESS NOTES
TRANSITIONAL CARE CLINIC PHARMACIST MEDICATION RECONCILIATION        Shraddha Foley MRN [id number]    1945 PCP Marlen Cole MD       Comments: Medication history completed in [address] [place] [address] by pharmacist with wife.   The following medicati with patient including dose, indication, timing of administration, monitoring parameters, and potential side effects of medications. Patient confirmed understanding.      Thank you,    Gwenda Gowers, PharmD, 11/14/2019, 11:28 AM  [facility]

## 2019-11-14 NOTE — PROGRESS NOTES
Al[facility]      HISTORY   CHIEF COMPLAINT: post hospital follow up visit  HPI: Mart Bermudez is a 66year old female here today for follow up after being hospitalized for abdominal pain.  Mart Bermudez was discha 3  Cyanocobalamin (VITAMIN B-12) 1000 MCG Sublingual SL Tab, Place under the tongue., Disp: , Rfl:   Cholecalciferol (VITAMIN D) 2000 units Oral Cap, Take 2,000 Units by mouth., Disp: , Rfl:   Magnesium 400 MG Oral Cap, Take 400 mg by mouth., Disp: , Rfl: localized, unspecified site     knees   • Paroxysmal A-fib ([address] 11/11/2014    Ablation in [address] [facility] 9/20/2010   • Pneumonia, organism unspecified(486)    • Reflux esophagitis    • Right hip pain 12/17/2014    R hip pain   • S/P knee replacement 8/5/2013    [address] Brad Millard, Belem Alexander MD at [address] [place] [facility] 10/29/2014    Performed by Brad Millard, Belem Alexander MD at [address] [place] [facility] 10/29/2014    Performed by Brad Millard, Belem Alexander MD at [address] [place] [address] 11/8/2019  CONCLUSION:   1. Superficial thrombophlebitis of the right cephalic vein. 2. No deep vein thrombosis.      Dictated by: Nadia Fletcher MD on 11/08/2019 at 21:45     Approved by: Nadia Fletcher MD on 11/08/2019 at 21:47            Lab Results   Co affect    ASSESSMENT/ PLAN:   1.  Small bowel obstruction (HCC) s/p exploratory lap lysis of adhesions/H/O gastric bypass  · Hydrocodone PRN, takes one per day - uses Tylenol the rest of the time  · Followed up with surgery on 11/13/19 - staples removed, st activities of daily living. ? Referrals as listed below in orders Assisted in scheduling required follow-up with community providers and services. Medication Reconciliation:  I am aware of an inpatient discharge within the last 30 days.   The discharge

## 2019-11-14 NOTE — PATIENT INSTRUCTIONS
Patient Instructions:  1. As a reminder, Dr. Vini Mcgraw would like you to have an ECHO completed and an event monitor placed. Please discuss this with her Nurse Practitioner at your next visit.   2.  Please call the [facility] if you develop a fe

## 2019-11-15 ENCOUNTER — TELEPHONE (OUTPATIENT)
Dept: FAMILY MEDICINE CLINIC | Facility: CLINIC | Age: 74
End: 2019-11-15

## 2019-11-15 NOTE — TELEPHONE ENCOUNTER
Ashley from [facility] PT called. She was informed her 11/8/19 TE notes state :     \"Instructed HH (per RN direction) that surgeon managing her care inpatient should be primarily managing patient in the mean time.      Pt scheduled for new

## 2019-11-18 NOTE — DISCHARGE SUMMARY
[facility]OSPITAL[facility]  DISCHARGE SUMMARY     Tawanda Dior Patient Status:  Inpatient    1945 MRN [id number]   [facility] 7NE-A Attending No att. providers found   [address] Day # 15 PCP Elmira Soria MD     Date of Admission: 10/28/2019  Date taking these medications      Instructions Prescription details   acetaminophen 500 MG Tabs  Commonly known as:  TYLENOL EXTRA STRENGTH      Take 500 mg by mouth every 6 (six) hours as needed for Pain.    Refills:  0     Amitriptyline HCl 50 MG Tabs  Common for a visit in 1 week  for stable removal    Appointments for Next 30 Days 11/17/2019 - 12/17/2019      Date Arrival Time Visit Type Length Department Provider     11/19/2019  1:00 PM  FOLLOW UP-HEM/ONC [2871] 15 min.  [facility] in [place]

## 2019-11-19 ENCOUNTER — APPOINTMENT (OUTPATIENT)
Dept: HEMATOLOGY/ONCOLOGY | Age: 74
End: 2019-11-19
Attending: INTERNAL MEDICINE
Payer: MEDICARE

## 2019-11-20 NOTE — PROGRESS NOTES
Multiple attempts to reach pt and messages left with no return call. Pt went in for TCC appt on 11/14/19. Encounter closing.

## 2019-11-21 ENCOUNTER — OFFICE VISIT (OUTPATIENT)
Dept: FAMILY MEDICINE CLINIC | Facility: CLINIC | Age: 74
End: 2019-11-21
Payer: MEDICARE

## 2019-11-21 VITALS
SYSTOLIC BLOOD PRESSURE: 118 MMHG | OXYGEN SATURATION: 99 % | HEART RATE: 56 BPM | DIASTOLIC BLOOD PRESSURE: 70 MMHG | BODY MASS INDEX: 33.66 KG/M2 | WEIGHT: 190 LBS | RESPIRATION RATE: 18 BRPM | HEIGHT: 63 IN

## 2019-11-21 DIAGNOSIS — K56.609 SMALL BOWEL OBSTRUCTION (HCC): Primary | ICD-10-CM

## 2019-11-21 DIAGNOSIS — E03.9 HYPOTHYROIDISM, UNSPECIFIED TYPE: ICD-10-CM

## 2019-11-21 DIAGNOSIS — Z13.0 SCREENING FOR ENDOCRINE, NUTRITIONAL, METABOLIC AND IMMUNITY DISORDER: ICD-10-CM

## 2019-11-21 DIAGNOSIS — I48.0 PAROXYSMAL A-FIB (HCC): ICD-10-CM

## 2019-11-21 DIAGNOSIS — Z13.29 SCREENING FOR ENDOCRINE, NUTRITIONAL, METABOLIC AND IMMUNITY DISORDER: ICD-10-CM

## 2019-11-21 DIAGNOSIS — Z13.228 SCREENING FOR ENDOCRINE, NUTRITIONAL, METABOLIC AND IMMUNITY DISORDER: ICD-10-CM

## 2019-11-21 DIAGNOSIS — Z13.21 ENCOUNTER FOR VITAMIN DEFICIENCY SCREENING: ICD-10-CM

## 2019-11-21 DIAGNOSIS — D50.8 OTHER IRON DEFICIENCY ANEMIA: ICD-10-CM

## 2019-11-21 DIAGNOSIS — Z78.0 MENOPAUSE PRESENT: ICD-10-CM

## 2019-11-21 DIAGNOSIS — Z13.21 SCREENING FOR ENDOCRINE, NUTRITIONAL, METABOLIC AND IMMUNITY DISORDER: ICD-10-CM

## 2019-11-21 DIAGNOSIS — Z23 NEED FOR PNEUMOCOCCAL VACCINATION: ICD-10-CM

## 2019-11-21 PROBLEM — L29.9 ITCHING: Status: RESOLVED | Noted: 2018-03-26 | Resolved: 2019-11-21

## 2019-11-21 PROBLEM — Z90.2 HISTORY OF LOBECTOMY OF LUNG: Status: ACTIVE | Noted: 2019-11-21

## 2019-11-21 PROCEDURE — G0009 ADMIN PNEUMOCOCCAL VACCINE: HCPCS | Performed by: EMERGENCY MEDICINE

## 2019-11-21 PROCEDURE — 99214 OFFICE O/P EST MOD 30 MIN: CPT | Performed by: EMERGENCY MEDICINE

## 2019-11-21 PROCEDURE — 90732 PPSV23 VACC 2 YRS+ SUBQ/IM: CPT | Performed by: EMERGENCY MEDICINE

## 2019-11-21 PROCEDURE — 1111F DSCHRG MED/CURRENT MED MERGE: CPT | Performed by: EMERGENCY MEDICINE

## 2019-11-21 RX ORDER — LEVOTHYROXINE SODIUM 0.05 MG/1
TABLET ORAL
Qty: 90 TABLET | Refills: 3 | Status: SHIPPED | OUTPATIENT
Start: 2019-11-21 | End: 2020-11-19

## 2019-11-21 RX ORDER — METOPROLOL SUCCINATE 100 MG/1
TABLET, EXTENDED RELEASE ORAL
Qty: 90 TABLET | Refills: 0 | Status: SHIPPED | OUTPATIENT
Start: 2019-11-21 | End: 2020-01-31

## 2019-11-21 NOTE — PROGRESS NOTES
Chief Complaint:   Patient presents with:  Hospital F/U: TCM, NP. Small bowel obstruction and high BP    HPI:   This is a 66year old female       [address] [facility]  Admitted for SBO. + remote history of gastric bypass Sx. S/P small bowel resection.  Presley Storm unspecified(486)    • Rash    • Reflux esophagitis    • Right hip pain 12/17/2014    R hip pain   • S/P knee replacement 8/5/2013    L 2013   • S/P  shunt 1990    cervical spine shunt surgery   • Shortness of breath    • Shoulder joint dysfunction 2004 THORACOTOMY Left 10/29/2014    Performed by Brad Millard, Erskin Brittle, MD at [facility]   • TONSILLECTOMY     • TUBAL LIGATION       Social History:  Social History    Tobacco Use      Smoking status: Former Smoker        Packs/day: 0.00        Quit date: 3/16/198 mouth daily. , Disp: , Rfl:     No current facility-administered medications on file prior to visit.       Counseling given: Not Answered         PROBLEM LIST     Patient Active Problem List:     Essential hypertension     Dyslipidemia     GERD (gastroesopha turgor, no obvious rashes  HEENT: atraumatic, normocephalic, ears, nose and throat are clear  EYES: sclera non icteric bilateral  NECK: supple, no adenopathy, no thyromegaly  LUNGS: clear to auscultation, no RRW  CARDIO: RRR without murmur  EXTREMITIES: no 0.00 - 1.00 x10(3) uL    Neutrophil % 79.0 %    Lymphocyte % 11.2 %    Monocyte % 7.0 %    Eosinophil % 1.6 %    Basophil % 0.2 %    Immature Granulocyte % 1.0 %   BASIC METABOLIC PANEL (8)    Collection Time: 11/04/19  6:06 AM   Result Value Ref Range Creatinine 1.30 (H) 0.55 - 1.02 mg/dL    BUN/CREA Ratio 12.3 10.0 - 20.0    Calcium, Total 8.3 (L) 8.5 - 10.1 mg/dL    Calculated Osmolality 294 275 - 295 mOsm/kg    GFR, Non- 40 (L) >=60    GFR, -American 46 (L) >=60   COMP METABOLI Result Value Ref Range    Glucose 145 (H) 70 - 99 mg/dL    Sodium 141 136 - 145 mmol/L    Potassium 4.2 3.5 - 5.1 mmol/L    Chloride 106 98 - 112 mmol/L    CO2 29.0 21.0 - 32.0 mmol/L    Anion Gap 6 0 - 18 mmol/L    BUN 17 7 - 18 mg/dL    Creatinine 1.13 Total 8.5 8.5 - 10.1 mg/dL    Calculated Osmolality 289 275 - 295 mOsm/kg    GFR, Non- 35 (L) >=60    GFR, -American 40 (L) >=60    AST 23 15 - 37 U/L    ALT 14 13 - 56 U/L    Alkaline Phosphatase 78 55 - 142 U/L    Bilirubin, Total

## 2019-11-21 NOTE — PATIENT INSTRUCTIONS
Thank you for choosing [facility]  To Do:   [address]    · Repeat thyroid test in 3 months  · Continue with current dose of levothyroxine  · Arrange for BONE DENSITY  · Have blood tests done after fasting when ready  · Follow up with DAKOTA WILLIAM [facility] [place] Tests may also be done to confirm the problem. These can include:  · Imaging tests. These provide pictures of the small bowel. Common tests include X-rays and a CT scan.   · Blood tests. These check for infection and other problems, such as excess fluid los chance for lifelong digestive problems. Bowel movements may become irregular. Work with your provider to learn the best ways to manage any symptoms you may have, and to protect your health.   When to call your healthcare provider  Call your provider right a This will boost the amount of iron stored in your body. It is a natural way to build up the number of blood cells. Good sources of iron include beef, liver, spinach and other dark green leafy vegetables, whole grains, beans, and nuts.   · Don't overexert yo

## 2019-11-26 ENCOUNTER — HOSPITAL ENCOUNTER (EMERGENCY)
Facility: HOSPITAL | Age: 74
Discharge: HOME | End: 2019-11-26
Attending: EMERGENCY MEDICINE
Payer: MEDICARE

## 2019-11-26 ENCOUNTER — APPOINTMENT (OUTPATIENT)
Dept: CT IMAGING | Facility: HOSPITAL | Age: 74
End: 2019-11-26
Attending: EMERGENCY MEDICINE
Payer: MEDICARE

## 2019-11-26 VITALS
DIASTOLIC BLOOD PRESSURE: 76 MMHG | TEMPERATURE: 98 F | SYSTOLIC BLOOD PRESSURE: 149 MMHG | BODY MASS INDEX: 33.13 KG/M2 | HEART RATE: 55 BPM | OXYGEN SATURATION: 95 % | HEIGHT: 63 IN | WEIGHT: 187 LBS | RESPIRATION RATE: 14 BRPM

## 2019-11-26 DIAGNOSIS — R55 SYNCOPE, NEAR: ICD-10-CM

## 2019-11-26 DIAGNOSIS — S09.90XA INJURY OF HEAD, INITIAL ENCOUNTER: Primary | ICD-10-CM

## 2019-11-26 PROCEDURE — 93005 ELECTROCARDIOGRAM TRACING: CPT

## 2019-11-26 PROCEDURE — 80053 COMPREHEN METABOLIC PANEL: CPT | Performed by: EMERGENCY MEDICINE

## 2019-11-26 PROCEDURE — 36415 COLL VENOUS BLD VENIPUNCTURE: CPT

## 2019-11-26 PROCEDURE — 85610 PROTHROMBIN TIME: CPT | Performed by: EMERGENCY MEDICINE

## 2019-11-26 PROCEDURE — 99285 EMERGENCY DEPT VISIT HI MDM: CPT

## 2019-11-26 PROCEDURE — 93010 ELECTROCARDIOGRAM REPORT: CPT

## 2019-11-26 PROCEDURE — 85025 COMPLETE CBC W/AUTO DIFF WBC: CPT | Performed by: EMERGENCY MEDICINE

## 2019-11-26 PROCEDURE — 70450 CT HEAD/BRAIN W/O DYE: CPT | Performed by: EMERGENCY MEDICINE

## 2019-11-26 PROCEDURE — 84484 ASSAY OF TROPONIN QUANT: CPT | Performed by: EMERGENCY MEDICINE

## 2019-11-26 PROCEDURE — 85730 THROMBOPLASTIN TIME PARTIAL: CPT | Performed by: EMERGENCY MEDICINE

## 2019-11-26 PROCEDURE — 99284 EMERGENCY DEPT VISIT MOD MDM: CPT

## 2019-11-26 NOTE — ED PROVIDER NOTES
Patient Seen in: [facility] Emergency Department      History   Patient presents with:  Fall (musculoskeletal, neurologic)  Fatigue (constitutional, neurologic)    Stated Complaint: Fall x2    HPI    Zulema Rucker is a pleasant 57-year-old female presentin HIGH BLOOD PRESSURE    • History of blood transfusion    • Hypothyroid    • Muscle weakness     weakness right side burning sensation at all times due to arnold  charlene's   • Neuropathy     both legs/ mobility loss    • Osteoarthrosis, unspecified whether HISTORY  1990    spine repair spina bifida   • OTHER SURGICAL HISTORY  11/02/2019    EXPLORATORY LAPAROTOMY, SMALL BOWEL RESECTION,    • REMOVAL OF TONSILS,<9 Y/O     • SHOULDER SURG PROC UNLISTED  1/1/2004    right   • SKIN SURGERY     • THORACOSCOPY/VAT All other components within normal limits   CBC W/ DIFFERENTIAL - Abnormal; Notable for the following components:    RBC 3.76 (*)     HGB 10.9 (*)     HCT 34.1 (*)     Eosinophil Absolute 0.82 (*)     All other components within normal limits   TROPONIN

## 2019-11-26 NOTE — ED INITIAL ASSESSMENT (HPI)
Pt states she had small bowel obstruction and had a resection of her intestine 3 weeks ago. This am she was going to Dr Nina Ayala office to have a holter monitor placed d/t her HR being elevated while she was in the hospital for her bowel resection.  She was

## 2019-12-02 ENCOUNTER — LAB ENCOUNTER (OUTPATIENT)
Dept: LAB | Age: 74
End: 2019-12-02
Attending: EMERGENCY MEDICINE
Payer: MEDICARE

## 2019-12-02 DIAGNOSIS — Z13.29 SCREENING FOR ENDOCRINE, NUTRITIONAL, METABOLIC AND IMMUNITY DISORDER: ICD-10-CM

## 2019-12-02 DIAGNOSIS — Z13.0 SCREENING FOR ENDOCRINE, NUTRITIONAL, METABOLIC AND IMMUNITY DISORDER: ICD-10-CM

## 2019-12-02 DIAGNOSIS — Z13.228 SCREENING FOR ENDOCRINE, NUTRITIONAL, METABOLIC AND IMMUNITY DISORDER: ICD-10-CM

## 2019-12-02 DIAGNOSIS — Z13.21 SCREENING FOR ENDOCRINE, NUTRITIONAL, METABOLIC AND IMMUNITY DISORDER: ICD-10-CM

## 2019-12-02 PROCEDURE — 36415 COLL VENOUS BLD VENIPUNCTURE: CPT

## 2019-12-02 PROCEDURE — 80053 COMPREHEN METABOLIC PANEL: CPT

## 2019-12-02 PROCEDURE — 85025 COMPLETE CBC W/AUTO DIFF WBC: CPT

## 2019-12-02 PROCEDURE — 80061 LIPID PANEL: CPT

## 2019-12-10 ENCOUNTER — NURSE ONLY (OUTPATIENT)
Dept: HEMATOLOGY/ONCOLOGY | Age: 74
End: 2019-12-10
Attending: INTERNAL MEDICINE
Payer: MEDICARE

## 2019-12-10 DIAGNOSIS — D50.8 OTHER IRON DEFICIENCY ANEMIA: ICD-10-CM

## 2019-12-10 DIAGNOSIS — D63.8 ANEMIA, CHRONIC DISEASE: Primary | ICD-10-CM

## 2019-12-10 DIAGNOSIS — D63.8 ANEMIA, CHRONIC DISEASE: ICD-10-CM

## 2019-12-10 PROCEDURE — 84238 ASSAY NONENDOCRINE RECEPTOR: CPT

## 2019-12-10 PROCEDURE — 85025 COMPLETE CBC W/AUTO DIFF WBC: CPT

## 2019-12-10 PROCEDURE — 82728 ASSAY OF FERRITIN: CPT

## 2019-12-10 PROCEDURE — 82607 VITAMIN B-12: CPT

## 2019-12-10 PROCEDURE — 83550 IRON BINDING TEST: CPT

## 2019-12-10 PROCEDURE — 82668 ASSAY OF ERYTHROPOIETIN: CPT

## 2019-12-10 PROCEDURE — 83540 ASSAY OF IRON: CPT

## 2019-12-10 PROCEDURE — 36415 COLL VENOUS BLD VENIPUNCTURE: CPT

## 2019-12-12 ENCOUNTER — TELEPHONE (OUTPATIENT)
Dept: HEMATOLOGY/ONCOLOGY | Age: 74
End: 2019-12-12

## 2019-12-12 ENCOUNTER — TELEPHONE (OUTPATIENT)
Dept: HEMATOLOGY/ONCOLOGY | Facility: HOSPITAL | Age: 74
End: 2019-12-12

## 2019-12-12 NOTE — TELEPHONE ENCOUNTER
Maryjo Leggettic can not get her Lab Results from My Chart computer is not working at home and she need a call back to get them.

## 2019-12-16 ENCOUNTER — TELEPHONE (OUTPATIENT)
Dept: FAMILY MEDICINE CLINIC | Facility: CLINIC | Age: 74
End: 2019-12-16

## 2019-12-16 ENCOUNTER — HOSPITAL ENCOUNTER (OUTPATIENT)
Dept: BONE DENSITY | Age: 74
Discharge: HOME | End: 2019-12-16
Attending: EMERGENCY MEDICINE
Payer: MEDICARE

## 2019-12-16 DIAGNOSIS — K56.609 SBO (SMALL BOWEL OBSTRUCTION) (HCC): Primary | ICD-10-CM

## 2019-12-16 DIAGNOSIS — Z98.84 S/P GASTRIC BYPASS: ICD-10-CM

## 2019-12-16 DIAGNOSIS — Z78.0 MENOPAUSE PRESENT: ICD-10-CM

## 2019-12-16 DIAGNOSIS — R19.7 DIARRHEA, UNSPECIFIED TYPE: ICD-10-CM

## 2019-12-16 PROCEDURE — 77080 DXA BONE DENSITY AXIAL: CPT | Performed by: EMERGENCY MEDICINE

## 2019-12-16 NOTE — TELEPHONE ENCOUNTER
Patient request GI referral. S/P gastric bypass surgery and recent SBO. Please advise on referral, thanks.

## 2019-12-16 NOTE — TELEPHONE ENCOUNTER
Reason: GI problems after surgery    Seen PCP for this: YES     Seen this specialty before: no    If yes, specialist name:

## 2019-12-17 ENCOUNTER — TELEPHONE (OUTPATIENT)
Dept: FAMILY MEDICINE CLINIC | Facility: CLINIC | Age: 74
End: 2019-12-17

## 2019-12-17 NOTE — TELEPHONE ENCOUNTER
Referral placed. Pt given contact information for Blanca Esquivel. Pt has no further questions at this time.

## 2019-12-17 NOTE — TELEPHONE ENCOUNTER
----- Message from Chelsey Coughlin MD sent at 12/17/2019  2:06 PM CST -----  Recommend  High calcium and protein diet   OTC Calcium supplements like Caltrate 600+D once a day  Pls  on Acitive life style and weight bearing  exercise 3 times a week

## 2019-12-17 NOTE — TELEPHONE ENCOUNTER
Called and spoke with pt. Pt informed of lab and test results below. Pt states understanding. Pt states she does not see Dr. Maye Daley. Her nephrologist is Dr. Mandi Novoa out of [facility] in [place]. Pt will f/u with him.  Pt has no further questions at this ti

## 2019-12-17 NOTE — TELEPHONE ENCOUNTER
----- Message from Ramesh Montes De Oca MD sent at 12/17/2019  3:22 PM CST -----  Anemia improving, continue follow up with hematology/oncology    Positive renal insufficiency, appears to be stable, please make sure that patient follows up with Dr. Mukund Guevara

## 2019-12-17 NOTE — TELEPHONE ENCOUNTER
Spoke with pt. Pt informed of provider message below. Pt states that she has already seen surgery and now needs a referral to GI.  Pt states she had surgery on the SOB 11/2/19 with Dr. Meeta Pineda and Dr. Fco Murrieta and 12 inches of her intestine was removed which

## 2020-02-05 ENCOUNTER — HOSPITAL ENCOUNTER (EMERGENCY)
Age: 75
Discharge: HOME | End: 2020-02-05
Attending: EMERGENCY MEDICINE
Payer: MEDICARE

## 2020-02-05 ENCOUNTER — LAB ENCOUNTER (OUTPATIENT)
Dept: LAB | Age: 75
End: 2020-02-05
Attending: EMERGENCY MEDICINE
Payer: MEDICARE

## 2020-02-05 ENCOUNTER — TELEPHONE (OUTPATIENT)
Dept: HEMATOLOGY/ONCOLOGY | Facility: HOSPITAL | Age: 75
End: 2020-02-05

## 2020-02-05 ENCOUNTER — TELEPHONE (OUTPATIENT)
Dept: FAMILY MEDICINE CLINIC | Facility: CLINIC | Age: 75
End: 2020-02-05

## 2020-02-05 VITALS
RESPIRATION RATE: 14 BRPM | WEIGHT: 188 LBS | HEART RATE: 85 BPM | OXYGEN SATURATION: 98 % | BODY MASS INDEX: 33.31 KG/M2 | TEMPERATURE: 98 F | DIASTOLIC BLOOD PRESSURE: 62 MMHG | HEIGHT: 63 IN | SYSTOLIC BLOOD PRESSURE: 160 MMHG

## 2020-02-05 DIAGNOSIS — E87.6 HYPOKALEMIA: Primary | ICD-10-CM

## 2020-02-05 DIAGNOSIS — E55.9 VITAMIN D DEFICIENCY: ICD-10-CM

## 2020-02-05 DIAGNOSIS — D63.1 ANEMIA OF CHRONIC RENAL FAILURE: Primary | ICD-10-CM

## 2020-02-05 DIAGNOSIS — D50.8 OTHER IRON DEFICIENCY ANEMIA: ICD-10-CM

## 2020-02-05 DIAGNOSIS — N18.30 CHRONIC KIDNEY DISEASE, STAGE III (MODERATE) (HCC): ICD-10-CM

## 2020-02-05 DIAGNOSIS — N25.0 RENAL OSTEODYSTROPHY: ICD-10-CM

## 2020-02-05 DIAGNOSIS — R19.7 DIARRHEA, UNSPECIFIED TYPE: ICD-10-CM

## 2020-02-05 DIAGNOSIS — E03.9 HYPOTHYROIDISM, UNSPECIFIED TYPE: ICD-10-CM

## 2020-02-05 DIAGNOSIS — D64.9 ANEMIA, UNSPECIFIED TYPE: ICD-10-CM

## 2020-02-05 DIAGNOSIS — N18.9 ANEMIA OF CHRONIC RENAL FAILURE: Primary | ICD-10-CM

## 2020-02-05 LAB
ANION GAP SERPL CALC-SCNC: 6 MMOL/L (ref 0–18)
ANION GAP SERPL CALC-SCNC: 8 MMOL/L (ref 0–18)
BASOPHILS # BLD AUTO: 0.07 X10(3) UL (ref 0–0.2)
BASOPHILS NFR BLD AUTO: 1.2 %
BILIRUB UR QL STRIP.AUTO: NEGATIVE
BUN BLD-MCNC: 13 MG/DL (ref 7–18)
BUN BLD-MCNC: 14 MG/DL (ref 7–18)
BUN/CREAT SERPL: 11.5 (ref 10–20)
BUN/CREAT SERPL: 9.7 (ref 10–20)
CALCIUM BLD-MCNC: 8.4 MG/DL (ref 8.5–10.1)
CALCIUM BLD-MCNC: 8.9 MG/DL (ref 8.5–10.1)
CHLORIDE SERPL-SCNC: 109 MMOL/L (ref 98–112)
CHLORIDE SERPL-SCNC: 110 MMOL/L (ref 98–112)
CO2 SERPL-SCNC: 28 MMOL/L (ref 21–32)
CO2 SERPL-SCNC: 28 MMOL/L (ref 21–32)
COLOR UR AUTO: YELLOW
CREAT BLD-MCNC: 1.22 MG/DL (ref 0.55–1.02)
CREAT BLD-MCNC: 1.34 MG/DL (ref 0.55–1.02)
DEPRECATED HBV CORE AB SER IA-ACNC: 104.3 NG/ML (ref 18–340)
DEPRECATED RDW RBC AUTO: 47.1 FL (ref 35.1–46.3)
EOSINOPHIL # BLD AUTO: 0.4 X10(3) UL (ref 0–0.7)
EOSINOPHIL NFR BLD AUTO: 7 %
ERYTHROCYTE [DISTWIDTH] IN BLOOD BY AUTOMATED COUNT: 14.6 % (ref 11–15)
GLUCOSE BLD-MCNC: 107 MG/DL (ref 70–99)
GLUCOSE BLD-MCNC: 96 MG/DL (ref 70–99)
GLUCOSE UR STRIP.AUTO-MCNC: NEGATIVE MG/DL
HAV IGM SER QL: 1.8 MG/DL (ref 1.6–2.6)
HCT VFR BLD AUTO: 37.6 % (ref 35–48)
HGB BLD-MCNC: 12.3 G/DL (ref 12–16)
IGA SERPL-MCNC: 334 MG/DL (ref 70–312)
IMM GRANULOCYTES # BLD AUTO: 0.01 X10(3) UL (ref 0–1)
IMM GRANULOCYTES NFR BLD: 0.2 %
IRON SATURATION: 20 % (ref 15–50)
IRON SERPL-MCNC: 58 UG/DL (ref 50–170)
KETONES UR STRIP.AUTO-MCNC: NEGATIVE MG/DL
LYMPHOCYTES # BLD AUTO: 1.92 X10(3) UL (ref 1–4)
LYMPHOCYTES NFR BLD AUTO: 33.7 %
MCH RBC QN AUTO: 29 PG (ref 26–34)
MCHC RBC AUTO-ENTMCNC: 32.7 G/DL (ref 31–37)
MCV RBC AUTO: 88.7 FL (ref 80–100)
MONOCYTES # BLD AUTO: 0.56 X10(3) UL (ref 0.1–1)
MONOCYTES NFR BLD AUTO: 9.8 %
NEUTROPHILS # BLD AUTO: 2.74 X10 (3) UL (ref 1.5–7.7)
NEUTROPHILS # BLD AUTO: 2.74 X10(3) UL (ref 1.5–7.7)
NEUTROPHILS NFR BLD AUTO: 48.1 %
NITRITE UR QL STRIP.AUTO: NEGATIVE
OSMOLALITY SERPL CALC.SUM OF ELEC: 299 MOSM/KG (ref 275–295)
OSMOLALITY SERPL CALC.SUM OF ELEC: 300 MOSM/KG (ref 275–295)
PATIENT FASTING Y/N/NP: YES
PH UR STRIP.AUTO: 6 [PH] (ref 4.5–8)
PLATELET # BLD AUTO: 309 10(3)UL (ref 150–450)
POTASSIUM SERPL-SCNC: 2.6 MMOL/L (ref 3.5–5.1)
POTASSIUM SERPL-SCNC: 2.8 MMOL/L (ref 3.5–5.1)
POTASSIUM SERPL-SCNC: 3.1 MMOL/L (ref 3.5–5.1)
PROT UR STRIP.AUTO-MCNC: NEGATIVE MG/DL
PTH-INTACT SERPL-MCNC: 200.5 PG/ML (ref 18.5–88)
RBC # BLD AUTO: 4.24 X10(6)UL (ref 3.8–5.3)
RBC UR QL AUTO: NEGATIVE
SODIUM SERPL-SCNC: 144 MMOL/L (ref 136–145)
SODIUM SERPL-SCNC: 145 MMOL/L (ref 136–145)
SP GR UR STRIP.AUTO: 1.01 (ref 1–1.03)
TOTAL IRON BINDING CAPACITY: 283 UG/DL (ref 240–450)
TRANSFERRIN SERPL-MCNC: 190 MG/DL (ref 200–360)
TSI SER-ACNC: 1.81 MIU/ML (ref 0.36–3.74)
UROBILINOGEN UR STRIP.AUTO-MCNC: <2 MG/DL
VIT D+METAB SERPL-MCNC: 25.9 NG/ML (ref 30–100)
WBC # BLD AUTO: 5.7 X10(3) UL (ref 4–11)

## 2020-02-05 PROCEDURE — 82306 VITAMIN D 25 HYDROXY: CPT

## 2020-02-05 PROCEDURE — 83735 ASSAY OF MAGNESIUM: CPT

## 2020-02-05 PROCEDURE — 82728 ASSAY OF FERRITIN: CPT

## 2020-02-05 PROCEDURE — 83550 IRON BINDING TEST: CPT

## 2020-02-05 PROCEDURE — 82784 ASSAY IGA/IGD/IGG/IGM EACH: CPT

## 2020-02-05 PROCEDURE — 36415 COLL VENOUS BLD VENIPUNCTURE: CPT

## 2020-02-05 PROCEDURE — 81001 URINALYSIS AUTO W/SCOPE: CPT

## 2020-02-05 PROCEDURE — 85025 COMPLETE CBC W/AUTO DIFF WBC: CPT

## 2020-02-05 PROCEDURE — 83516 IMMUNOASSAY NONANTIBODY: CPT

## 2020-02-05 PROCEDURE — 80048 BASIC METABOLIC PNL TOTAL CA: CPT | Performed by: EMERGENCY MEDICINE

## 2020-02-05 PROCEDURE — 80048 BASIC METABOLIC PNL TOTAL CA: CPT

## 2020-02-05 PROCEDURE — 84443 ASSAY THYROID STIM HORMONE: CPT

## 2020-02-05 PROCEDURE — 83970 ASSAY OF PARATHORMONE: CPT

## 2020-02-05 PROCEDURE — 86256 FLUORESCENT ANTIBODY TITER: CPT

## 2020-02-05 PROCEDURE — 99285 EMERGENCY DEPT VISIT HI MDM: CPT

## 2020-02-05 PROCEDURE — 93010 ELECTROCARDIOGRAM REPORT: CPT

## 2020-02-05 PROCEDURE — 96366 THER/PROPH/DIAG IV INF ADDON: CPT

## 2020-02-05 PROCEDURE — 96365 THER/PROPH/DIAG IV INF INIT: CPT

## 2020-02-05 PROCEDURE — 93005 ELECTROCARDIOGRAM TRACING: CPT

## 2020-02-05 PROCEDURE — 99284 EMERGENCY DEPT VISIT MOD MDM: CPT

## 2020-02-05 PROCEDURE — 83540 ASSAY OF IRON: CPT

## 2020-02-05 PROCEDURE — 84132 ASSAY OF SERUM POTASSIUM: CPT | Performed by: EMERGENCY MEDICINE

## 2020-02-05 RX ORDER — POTASSIUM CHLORIDE 20 MEQ/1
20 TABLET, EXTENDED RELEASE ORAL 2 TIMES DAILY
Qty: 6 TABLET | Refills: 0 | Status: SHIPPED | OUTPATIENT
Start: 2020-02-05 | End: 2020-02-08

## 2020-02-05 RX ORDER — POTASSIUM CHLORIDE 20 MEQ/1
40 TABLET, EXTENDED RELEASE ORAL ONCE
Status: COMPLETED | OUTPATIENT
Start: 2020-02-05 | End: 2020-02-05

## 2020-02-05 RX ORDER — POTASSIUM CHLORIDE 14.9 MG/ML
20 INJECTION INTRAVENOUS ONCE
Status: COMPLETED | OUTPATIENT
Start: 2020-02-05 | End: 2020-02-05

## 2020-02-05 NOTE — ED PROVIDER NOTES
Patient Seen in: [facility] Emergency Department In [place]      History   Patient presents with:  Abnormal Result    Stated Complaint: potassium level -2.8    HPI    This is a 49-year-old female who arrives here with complaints of low potassium potassium following components:       Result Value    Potassium 2.6 (*)     Creatinine 1.34 (*)     BUN/CREA Ratio 9.7 (*)     Calcium, Total 8.4 (*)     Calculated Osmolality 300 (*)     GFR, Non- 39 (*)     GFR, -American 44 (*)     All othe

## 2020-02-05 NOTE — TELEPHONE ENCOUNTER
Duncan Gammon called to let Dr Ruby Adams know she had her labs drawn today. Her PCP's office called her to let her know her K=2.8. She was instructed to go to the [place] ER for evaluation & treatment.      I told Dakota King I would make sure Dr Ruby Adams & her nurse are

## 2020-02-05 NOTE — TELEPHONE ENCOUNTER
Called and spoke with pt. Pt informed of that her potassium is low and that she needs to got to the ER for evaluation and treatment. Pt states her  will be home at 3pm and she will go to [facility] ER at that time.  Message routed to providers [facility]

## 2020-02-06 LAB
ATRIAL RATE: 72 BPM
P AXIS: 98 DEGREES
P-R INTERVAL: 214 MS
Q-T INTERVAL: 424 MS
QRS DURATION: 100 MS
QTC CALCULATION (BEZET): 464 MS
R AXIS: 87 DEGREES
T AXIS: 81 DEGREES
VENTRICULAR RATE: 72 BPM

## 2020-02-07 LAB — TTG IGA SER-ACNC: 0.6 U/ML (ref ?–7)

## 2020-02-10 ENCOUNTER — APPOINTMENT (OUTPATIENT)
Dept: LAB | Age: 75
End: 2020-02-10
Attending: EMERGENCY MEDICINE
Payer: MEDICARE

## 2020-02-10 ENCOUNTER — OFFICE VISIT (OUTPATIENT)
Dept: FAMILY MEDICINE CLINIC | Facility: CLINIC | Age: 75
End: 2020-02-10
Payer: MEDICARE

## 2020-02-10 VITALS
BODY MASS INDEX: 33 KG/M2 | HEART RATE: 79 BPM | OXYGEN SATURATION: 96 % | SYSTOLIC BLOOD PRESSURE: 138 MMHG | DIASTOLIC BLOOD PRESSURE: 80 MMHG | WEIGHT: 189 LBS | RESPIRATION RATE: 15 BRPM

## 2020-02-10 DIAGNOSIS — R60.0 PEDAL EDEMA: ICD-10-CM

## 2020-02-10 DIAGNOSIS — N18.30 CKD (CHRONIC KIDNEY DISEASE) STAGE 3, GFR 30-59 ML/MIN (HCC): ICD-10-CM

## 2020-02-10 DIAGNOSIS — E87.6 HYPOKALEMIA: Primary | ICD-10-CM

## 2020-02-10 DIAGNOSIS — E87.6 HYPOKALEMIA: ICD-10-CM

## 2020-02-10 PROBLEM — E21.3 HYPERPARATHYROIDISM (HCC): Status: ACTIVE | Noted: 2020-02-10

## 2020-02-10 PROBLEM — K56.609 SMALL BOWEL OBSTRUCTION (HCC): Status: RESOLVED | Noted: 2019-10-28 | Resolved: 2020-02-10

## 2020-02-10 LAB — POTASSIUM SERPL-SCNC: 3.9 MMOL/L (ref 3.5–5.1)

## 2020-02-10 PROCEDURE — 99214 OFFICE O/P EST MOD 30 MIN: CPT | Performed by: EMERGENCY MEDICINE

## 2020-02-10 PROCEDURE — 84132 ASSAY OF SERUM POTASSIUM: CPT

## 2020-02-10 PROCEDURE — 1111F DSCHRG MED/CURRENT MED MERGE: CPT | Performed by: EMERGENCY MEDICINE

## 2020-02-10 PROCEDURE — 36415 COLL VENOUS BLD VENIPUNCTURE: CPT

## 2020-02-10 NOTE — PROGRESS NOTES
Vitamin levels, Celiac markers all look good. Any improvement with the probiotics? If not, then we can trial a 2 week course of antibiotics for bacterial overgrowth, commonly found after gastric bypasses.   Please call with any questions/updates,    Humera

## 2020-02-10 NOTE — PATIENT INSTRUCTIONS
Thank you for choosing [address] [place] [address] Group  To Do:  FOR LENCHO SANCHES    · Recommend compression socks, 15-20 mm Hg  · Elevate legs  · Drink lots of water  · Low salt diet  · Stop lasix for now  · Arrange for US of legs  · Ok to have blood test today  ·

## 2020-02-10 NOTE — PROGRESS NOTES
Chief Complaint:   Patient presents with:  ER F/U: Hypokalemia    HPI:   This is a 66year old female       HYPOKALEMIA  Follow up re recent ER visit  + On Lasix for leg swelling which to hypokalemia. + history of chronic diarrhea from prev gastric bypas transfusion    • History of cardiac murmur    • Hypothyroid    • Leg swelling    • Muscle weakness     weakness right side burning sensation at all times due to arnold  charlene's   • Neuropathy     both legs/ mobility loss    • Osteoarthrosis, unspecified w HISTORY  1993    craniotomy chiari repair flo   • OTHER SURGICAL HISTORY  1990    spine repair spina bifida   • OTHER SURGICAL HISTORY  11/02/2019    EXPLORATORY LAPAROTOMY, SMALL BOWEL RESECTION,    • REMOVAL OF TONSILS,<13 Y/O     • SHOULDER SURG ID MG Oral Tab, Take 1 tablet (20 mg total) by mouth daily. , Disp: 90 tablet, Rfl: 3  amLODIPine Besylate 5 MG Oral Tab, Take 1 tablet (5 mg total) by mouth daily. , Disp: 90 tablet, Rfl: 3  Amitriptyline HCl 50 MG Oral Tab, Take 2 tablets (100 mg total) by mo of lateral side wall of nose     IFG (impaired fasting glucose)     Hyponatremia     S/P exploratory laparotomy     History of lobectomy of lung     Hyperparathyroidism ([facility] [address]        REVIEW OF SYSTEMS:   Review of systems significant for pedal edema.   The r now  · Arrange for US of legs  · Ok to have blood test today to recheck K  · Continue with Vit D supplementation  · Follow up with nephrology

## 2020-02-11 ENCOUNTER — TELEPHONE (OUTPATIENT)
Dept: FAMILY MEDICINE CLINIC | Facility: CLINIC | Age: 75
End: 2020-02-11

## 2020-02-11 NOTE — TELEPHONE ENCOUNTER
----- Message from Yamile Salcedo MD sent at 2/11/2020 12:35 PM CST -----  Potassium stable  Continue present mgt    Spoke to pt with results/instrucitons.   Pt states she just saw the kidney doctor and he is putting her back on the lasix as he does not

## 2020-03-04 ENCOUNTER — LAB ENCOUNTER (OUTPATIENT)
Dept: LAB | Age: 75
End: 2020-03-04
Attending: INTERNAL MEDICINE
Payer: MEDICARE

## 2020-03-04 DIAGNOSIS — R19.7 DIARRHEA, UNSPECIFIED TYPE: ICD-10-CM

## 2020-03-04 DIAGNOSIS — N17.9 ACUTE KIDNEY FAILURE, UNSPECIFIED (HCC): Primary | ICD-10-CM

## 2020-03-04 LAB
ANION GAP SERPL CALC-SCNC: 6 MMOL/L (ref 0–18)
BUN BLD-MCNC: 15 MG/DL (ref 7–18)
BUN/CREAT SERPL: 10.1 (ref 10–20)
CALCIUM BLD-MCNC: 8.8 MG/DL (ref 8.5–10.1)
CHLORIDE SERPL-SCNC: 111 MMOL/L (ref 98–112)
CO2 SERPL-SCNC: 25 MMOL/L (ref 21–32)
CREAT BLD-MCNC: 1.48 MG/DL (ref 0.55–1.02)
GLUCOSE BLD-MCNC: 139 MG/DL (ref 70–99)
HAV IGM SER QL: 1.8 MG/DL (ref 1.6–2.6)
OSMOLALITY SERPL CALC.SUM OF ELEC: 297 MOSM/KG (ref 275–295)
PATIENT FASTING Y/N/NP: NO
POTASSIUM SERPL-SCNC: 3.2 MMOL/L (ref 3.5–5.1)
SODIUM SERPL-SCNC: 142 MMOL/L (ref 136–145)

## 2020-03-04 PROCEDURE — 87077 CULTURE AEROBIC IDENTIFY: CPT

## 2020-03-04 PROCEDURE — 36415 COLL VENOUS BLD VENIPUNCTURE: CPT

## 2020-03-04 PROCEDURE — 87427 SHIGA-LIKE TOXIN AG IA: CPT

## 2020-03-04 PROCEDURE — 83735 ASSAY OF MAGNESIUM: CPT

## 2020-03-04 PROCEDURE — 87045 FECES CULTURE AEROBIC BACT: CPT

## 2020-03-04 PROCEDURE — 87046 STOOL CULTR AEROBIC BACT EA: CPT

## 2020-03-04 PROCEDURE — 82656 EL-1 FECAL QUAL/SEMIQ: CPT

## 2020-03-04 PROCEDURE — 82705 FATS/LIPIDS FECES QUAL: CPT

## 2020-03-04 PROCEDURE — 89055 LEUKOCYTE ASSESSMENT FECAL: CPT

## 2020-03-04 PROCEDURE — 80048 BASIC METABOLIC PNL TOTAL CA: CPT

## 2020-03-07 LAB — PANCREATIC ELASTASE , FECAL: 115 UG/G

## 2020-03-11 NOTE — PROGRESS NOTES
Mrs Ambriz Less needs EUS consult with Dr Trip Gore. Sonal or Pascual Lan, please schedule. Patient is aware.   Thanks,  PM

## 2020-03-11 NOTE — PROGRESS NOTES
Ho Cuenca and reviewed low pancreatic elastase levels. She likely has some pancreatic atrophy related to age, but will need further testing. Rx for pancreatic enzyme supplements sent, reviewed how to take them over the phone.   She should notify offic

## 2020-03-12 NOTE — PROGRESS NOTES
Patient is scheduled for 3/25 with Dr. Brooke Coleman. Patient is also scheduled for 3/23 with [facility]. Does she still need the apt with [facility]?

## 2020-03-24 ENCOUNTER — LAB ENCOUNTER (OUTPATIENT)
Dept: LAB | Age: 75
End: 2020-03-24
Attending: INTERNAL MEDICINE
Payer: MEDICARE

## 2020-03-24 DIAGNOSIS — E55.9 AVITAMINOSIS D: ICD-10-CM

## 2020-03-24 DIAGNOSIS — N25.0 RENAL OSTEODYSTROPHY: ICD-10-CM

## 2020-03-24 DIAGNOSIS — N18.30 CHRONIC KIDNEY DISEASE, STAGE III (MODERATE) (HCC): ICD-10-CM

## 2020-03-24 DIAGNOSIS — N18.9 ANEMIA OF CHRONIC RENAL FAILURE: Primary | ICD-10-CM

## 2020-03-24 DIAGNOSIS — D63.1 ANEMIA OF CHRONIC RENAL FAILURE: Primary | ICD-10-CM

## 2020-03-24 LAB
ANION GAP SERPL CALC-SCNC: 7 MMOL/L (ref 0–18)
BASOPHILS # BLD AUTO: 0.07 X10(3) UL (ref 0–0.2)
BASOPHILS NFR BLD AUTO: 1.1 %
BILIRUB UR QL STRIP.AUTO: NEGATIVE
BUN BLD-MCNC: 26 MG/DL (ref 7–18)
BUN/CREAT SERPL: 15.5 (ref 10–20)
CALCIUM BLD-MCNC: 8.9 MG/DL (ref 8.5–10.1)
CHLORIDE SERPL-SCNC: 111 MMOL/L (ref 98–112)
CLARITY UR REFRACT.AUTO: CLEAR
CO2 SERPL-SCNC: 22 MMOL/L (ref 21–32)
CREAT BLD-MCNC: 1.68 MG/DL (ref 0.55–1.02)
CREAT UR-SCNC: 38.9 MG/DL
CREAT UR-SCNC: 42.9 MG/DL
DEPRECATED RDW RBC AUTO: 45.7 FL (ref 35.1–46.3)
EOSINOPHIL # BLD AUTO: 0.31 X10(3) UL (ref 0–0.7)
EOSINOPHIL NFR BLD AUTO: 4.8 %
ERYTHROCYTE [DISTWIDTH] IN BLOOD BY AUTOMATED COUNT: 13.4 % (ref 11–15)
GLUCOSE BLD-MCNC: 103 MG/DL (ref 70–99)
GLUCOSE UR STRIP.AUTO-MCNC: NEGATIVE MG/DL
HCT VFR BLD AUTO: 42.7 % (ref 35–48)
HGB BLD-MCNC: 13.2 G/DL (ref 12–16)
IMM GRANULOCYTES # BLD AUTO: 0.01 X10(3) UL (ref 0–1)
IMM GRANULOCYTES NFR BLD: 0.2 %
KETONES UR STRIP.AUTO-MCNC: NEGATIVE MG/DL
LEUKOCYTE ESTERASE UR QL STRIP.AUTO: NEGATIVE
LYMPHOCYTES # BLD AUTO: 2.22 X10(3) UL (ref 1–4)
LYMPHOCYTES NFR BLD AUTO: 34.3 %
MCH RBC QN AUTO: 28.4 PG (ref 26–34)
MCHC RBC AUTO-ENTMCNC: 30.9 G/DL (ref 31–37)
MCV RBC AUTO: 91.8 FL (ref 80–100)
MICROALBUMIN UR-MCNC: 1.12 MG/DL
MICROALBUMIN/CREAT 24H UR-RTO: 26.1 UG/MG (ref ?–30)
MONOCYTES # BLD AUTO: 0.51 X10(3) UL (ref 0.1–1)
MONOCYTES NFR BLD AUTO: 7.9 %
NEUTROPHILS # BLD AUTO: 3.36 X10 (3) UL (ref 1.5–7.7)
NEUTROPHILS # BLD AUTO: 3.36 X10(3) UL (ref 1.5–7.7)
NEUTROPHILS NFR BLD AUTO: 51.7 %
NITRITE UR QL STRIP.AUTO: NEGATIVE
OSMOLALITY SERPL CALC.SUM OF ELEC: 295 MOSM/KG (ref 275–295)
PATIENT FASTING Y/N/NP: YES
PH UR STRIP.AUTO: 5 [PH] (ref 4.5–8)
PLATELET # BLD AUTO: 310 10(3)UL (ref 150–450)
POTASSIUM SERPL-SCNC: 4.1 MMOL/L (ref 3.5–5.1)
PROT UR STRIP.AUTO-MCNC: NEGATIVE MG/DL
PTH-INTACT SERPL-MCNC: 152.8 PG/ML (ref 18.5–88)
RBC # BLD AUTO: 4.65 X10(6)UL (ref 3.8–5.3)
RBC UR QL AUTO: NEGATIVE
SODIUM SERPL-SCNC: 140 MMOL/L (ref 136–145)
SP GR UR STRIP.AUTO: 1.01 (ref 1–1.03)
UROBILINOGEN UR STRIP.AUTO-MCNC: <2 MG/DL
VIT D+METAB SERPL-MCNC: 28.9 NG/ML (ref 30–100)
WBC # BLD AUTO: 6.5 X10(3) UL (ref 4–11)

## 2020-03-24 PROCEDURE — 36415 COLL VENOUS BLD VENIPUNCTURE: CPT

## 2020-03-24 PROCEDURE — 82570 ASSAY OF URINE CREATININE: CPT

## 2020-03-24 PROCEDURE — 85025 COMPLETE CBC W/AUTO DIFF WBC: CPT

## 2020-03-24 PROCEDURE — 83970 ASSAY OF PARATHORMONE: CPT

## 2020-03-24 PROCEDURE — 82306 VITAMIN D 25 HYDROXY: CPT

## 2020-03-24 PROCEDURE — 81003 URINALYSIS AUTO W/O SCOPE: CPT

## 2020-03-24 PROCEDURE — 82043 UR ALBUMIN QUANTITATIVE: CPT

## 2020-03-24 PROCEDURE — 80048 BASIC METABOLIC PNL TOTAL CA: CPT

## 2020-04-30 RX ORDER — AMITRIPTYLINE HYDROCHLORIDE 50 MG/1
100 TABLET, FILM COATED ORAL 2 TIMES DAILY
Qty: 360 TABLET | Refills: 3 | OUTPATIENT
Start: 2020-04-30

## 2020-04-30 RX ORDER — AMITRIPTYLINE HYDROCHLORIDE 50 MG/1
100 TABLET, FILM COATED ORAL 2 TIMES DAILY
Qty: 360 TABLET | Refills: 3 | Status: SHIPPED | OUTPATIENT
Start: 2020-04-30 | End: 2021-07-07

## 2020-05-01 NOTE — TELEPHONE ENCOUNTER
Medication(s) to Refill:   Requested Prescriptions     Pending Prescriptions Disp Refills   • Amitriptyline HCl 50 MG Oral Tab 360 tablet 3     Sig: Take 2 tablets (100 mg total) by mouth 2 (two) times daily.          Reason for Medication Refill being sent

## 2020-05-05 RX ORDER — AMITRIPTYLINE HYDROCHLORIDE 50 MG/1
100 TABLET, FILM COATED ORAL 2 TIMES DAILY
Qty: 360 TABLET | Refills: 3 | OUTPATIENT
Start: 2020-05-05

## 2020-06-17 ENCOUNTER — LAB ENCOUNTER (OUTPATIENT)
Dept: LAB | Age: 75
End: 2020-06-17
Attending: INTERNAL MEDICINE
Payer: MEDICARE

## 2020-06-17 DIAGNOSIS — E87.6 HYPOKALEMIA: Primary | ICD-10-CM

## 2020-06-17 PROCEDURE — 80048 BASIC METABOLIC PNL TOTAL CA: CPT

## 2020-06-17 PROCEDURE — 36415 COLL VENOUS BLD VENIPUNCTURE: CPT

## 2020-08-01 ENCOUNTER — HOSPITAL ENCOUNTER (OUTPATIENT)
Facility: HOSPITAL | Age: 75
Setting detail: OBSERVATION
Discharge: HOME | End: 2020-08-03
Attending: EMERGENCY MEDICINE | Admitting: HOSPITALIST
Payer: MEDICARE

## 2020-08-01 ENCOUNTER — APPOINTMENT (OUTPATIENT)
Dept: GENERAL RADIOLOGY | Age: 75
End: 2020-08-01
Attending: EMERGENCY MEDICINE
Payer: MEDICARE

## 2020-08-01 DIAGNOSIS — E03.9 HYPOTHYROIDISM, UNSPECIFIED TYPE: ICD-10-CM

## 2020-08-01 DIAGNOSIS — R07.9 CHEST PAIN, RULE OUT ACUTE MYOCARDIAL INFARCTION: Primary | ICD-10-CM

## 2020-08-01 LAB
ALBUMIN SERPL-MCNC: 3.6 G/DL (ref 3.4–5)
ALBUMIN/GLOB SERPL: 1 {RATIO} (ref 1–2)
ALP LIVER SERPL-CCNC: 82 U/L (ref 55–142)
ALT SERPL-CCNC: 32 U/L (ref 13–56)
ANION GAP SERPL CALC-SCNC: 9 MMOL/L (ref 0–18)
APTT PPP: 29 SECONDS (ref 25.4–36.1)
AST SERPL-CCNC: 74 U/L (ref 15–37)
BASOPHILS # BLD AUTO: 0.04 X10(3) UL (ref 0–0.2)
BASOPHILS NFR BLD AUTO: 0.5 %
BILIRUB SERPL-MCNC: 0.3 MG/DL (ref 0.1–2)
BUN BLD-MCNC: 46 MG/DL (ref 7–18)
BUN/CREAT SERPL: 22.8 (ref 10–20)
CALCIUM BLD-MCNC: 8.6 MG/DL (ref 8.5–10.1)
CHLORIDE SERPL-SCNC: 104 MMOL/L (ref 98–112)
CO2 SERPL-SCNC: 23 MMOL/L (ref 21–32)
CREAT BLD-MCNC: 2.02 MG/DL (ref 0.55–1.02)
DEPRECATED RDW RBC AUTO: 40.8 FL (ref 35.1–46.3)
EOSINOPHIL # BLD AUTO: 0.32 X10(3) UL (ref 0–0.7)
EOSINOPHIL NFR BLD AUTO: 4.2 %
ERYTHROCYTE [DISTWIDTH] IN BLOOD BY AUTOMATED COUNT: 12 % (ref 11–15)
GLOBULIN PLAS-MCNC: 3.7 G/DL (ref 2.8–4.4)
GLUCOSE BLD-MCNC: 117 MG/DL (ref 70–99)
HCT VFR BLD AUTO: 36.5 % (ref 35–48)
HGB BLD-MCNC: 12 G/DL (ref 12–16)
IMM GRANULOCYTES # BLD AUTO: 0.02 X10(3) UL (ref 0–1)
IMM GRANULOCYTES NFR BLD: 0.3 %
INR BLD: 1.08 (ref 0.88–1.11)
LYMPHOCYTES # BLD AUTO: 2.59 X10(3) UL (ref 1–4)
LYMPHOCYTES NFR BLD AUTO: 33.7 %
M PROTEIN MFR SERPL ELPH: 7.3 G/DL (ref 6.4–8.2)
MCH RBC QN AUTO: 30.6 PG (ref 26–34)
MCHC RBC AUTO-ENTMCNC: 32.9 G/DL (ref 31–37)
MCV RBC AUTO: 93.1 FL (ref 80–100)
MONOCYTES # BLD AUTO: 0.74 X10(3) UL (ref 0.1–1)
MONOCYTES NFR BLD AUTO: 9.6 %
NEUTROPHILS # BLD AUTO: 3.97 X10 (3) UL (ref 1.5–7.7)
NEUTROPHILS # BLD AUTO: 3.97 X10(3) UL (ref 1.5–7.7)
NEUTROPHILS NFR BLD AUTO: 51.7 %
NT-PROBNP SERPL-MCNC: 373 PG/ML (ref ?–450)
OSMOLALITY SERPL CALC.SUM OF ELEC: 295 MOSM/KG (ref 275–295)
PLATELET # BLD AUTO: 280 10(3)UL (ref 150–450)
POTASSIUM SERPL-SCNC: 3.8 MMOL/L (ref 3.5–5.1)
PSA SERPL DL<=0.01 NG/ML-MCNC: 14 SECONDS (ref 12–14.3)
RBC # BLD AUTO: 3.92 X10(6)UL (ref 3.8–5.3)
SARS-COV-2 RNA RESP QL NAA+PROBE: NOT DETECTED
SODIUM SERPL-SCNC: 136 MMOL/L (ref 136–145)
TROPONIN I SERPL-MCNC: <0.045 NG/ML (ref ?–0.04)
WBC # BLD AUTO: 7.7 X10(3) UL (ref 4–11)

## 2020-08-01 PROCEDURE — 71045 X-RAY EXAM CHEST 1 VIEW: CPT | Performed by: EMERGENCY MEDICINE

## 2020-08-01 PROCEDURE — 99220 INITIAL OBSERVATION CARE,LEVL III: CPT | Performed by: HOSPITALIST

## 2020-08-01 RX ORDER — MORPHINE SULFATE 4 MG/ML
2 INJECTION, SOLUTION INTRAMUSCULAR; INTRAVENOUS EVERY 30 MIN PRN
Status: ACTIVE | OUTPATIENT
Start: 2020-08-01 | End: 2020-08-01

## 2020-08-01 RX ORDER — MORPHINE SULFATE 4 MG/ML
2 INJECTION, SOLUTION INTRAMUSCULAR; INTRAVENOUS ONCE
Status: COMPLETED | OUTPATIENT
Start: 2020-08-01 | End: 2020-08-01

## 2020-08-01 RX ORDER — NITROGLYCERIN 0.4 MG/1
0.4 TABLET SUBLINGUAL ONCE
Status: COMPLETED | OUTPATIENT
Start: 2020-08-01 | End: 2020-08-01

## 2020-08-01 RX ORDER — ASPIRIN 81 MG/1
324 TABLET, CHEWABLE ORAL ONCE
Status: COMPLETED | OUTPATIENT
Start: 2020-08-01 | End: 2020-08-01

## 2020-08-01 RX ORDER — ONDANSETRON 2 MG/ML
4 INJECTION INTRAMUSCULAR; INTRAVENOUS EVERY 4 HOURS PRN
Status: DISCONTINUED | OUTPATIENT
Start: 2020-08-01 | End: 2020-08-03

## 2020-08-02 ENCOUNTER — APPOINTMENT (OUTPATIENT)
Dept: CV DIAGNOSTICS | Facility: HOSPITAL | Age: 75
End: 2020-08-02
Attending: NURSE PRACTITIONER
Payer: MEDICARE

## 2020-08-02 LAB
ANION GAP SERPL CALC-SCNC: 2 MMOL/L (ref 0–18)
ATRIAL RATE: 312 BPM
ATRIAL RATE: 62 BPM
BUN BLD-MCNC: 46 MG/DL (ref 7–18)
BUN/CREAT SERPL: 25.6 (ref 10–20)
CALCIUM BLD-MCNC: 8.6 MG/DL (ref 8.5–10.1)
CHLORIDE SERPL-SCNC: 108 MMOL/L (ref 98–112)
CO2 SERPL-SCNC: 28 MMOL/L (ref 21–32)
CREAT BLD-MCNC: 1.8 MG/DL (ref 0.55–1.02)
EST. AVERAGE GLUCOSE BLD GHB EST-MCNC: 114 MG/DL (ref 68–126)
GLUCOSE BLD-MCNC: 92 MG/DL (ref 70–99)
HBA1C MFR BLD HPLC: 5.6 % (ref ?–5.7)
OSMOLALITY SERPL CALC.SUM OF ELEC: 298 MOSM/KG (ref 275–295)
P AXIS: 83 DEGREES
P-R INTERVAL: 230 MS
POTASSIUM SERPL-SCNC: 3.8 MMOL/L (ref 3.5–5.1)
Q-T INTERVAL: 422 MS
Q-T INTERVAL: 434 MS
QRS DURATION: 104 MS
QRS DURATION: 122 MS
QTC CALCULATION (BEZET): 411 MS
QTC CALCULATION (BEZET): 428 MS
R AXIS: 73 DEGREES
R AXIS: 76 DEGREES
SODIUM SERPL-SCNC: 138 MMOL/L (ref 136–145)
T AXIS: 57 DEGREES
T AXIS: 85 DEGREES
TROPONIN I SERPL-MCNC: <0.045 NG/ML (ref ?–0.04)
TROPONIN I SERPL-MCNC: <0.045 NG/ML (ref ?–0.04)
VENTRICULAR RATE: 54 BPM
VENTRICULAR RATE: 62 BPM

## 2020-08-02 PROCEDURE — 93306 TTE W/DOPPLER COMPLETE: CPT | Performed by: NURSE PRACTITIONER

## 2020-08-02 PROCEDURE — 99225 SUBSEQUENT OBSERVATION CARE: CPT | Performed by: HOSPITALIST

## 2020-08-02 RX ORDER — AMLODIPINE BESYLATE 5 MG/1
5 TABLET ORAL DAILY
Status: DISCONTINUED | OUTPATIENT
Start: 2020-08-02 | End: 2020-08-03

## 2020-08-02 RX ORDER — POTASSIUM CHLORIDE 20 MEQ/1
40 TABLET, EXTENDED RELEASE ORAL ONCE
Status: COMPLETED | OUTPATIENT
Start: 2020-08-02 | End: 2020-08-02

## 2020-08-02 RX ORDER — SODIUM CHLORIDE 9 MG/ML
INJECTION, SOLUTION INTRAVENOUS CONTINUOUS
Status: DISCONTINUED | OUTPATIENT
Start: 2020-08-02 | End: 2020-08-02

## 2020-08-02 RX ORDER — SODIUM CHLORIDE 9 MG/ML
INJECTION, SOLUTION INTRAVENOUS ONCE
Status: COMPLETED | OUTPATIENT
Start: 2020-08-02 | End: 2020-08-02

## 2020-08-02 RX ORDER — ACETAMINOPHEN 325 MG/1
650 TABLET ORAL EVERY 6 HOURS PRN
Status: DISCONTINUED | OUTPATIENT
Start: 2020-08-02 | End: 2020-08-03

## 2020-08-02 RX ORDER — ACETAMINOPHEN 160 MG
2000 TABLET,DISINTEGRATING ORAL DAILY
Status: DISCONTINUED | OUTPATIENT
Start: 2020-08-02 | End: 2020-08-03

## 2020-08-02 RX ORDER — FUROSEMIDE 20 MG/1
20 TABLET ORAL DAILY
Status: DISCONTINUED | OUTPATIENT
Start: 2020-08-02 | End: 2020-08-02

## 2020-08-02 RX ORDER — CALCITRIOL 0.25 UG/1
0.25 CAPSULE, LIQUID FILLED ORAL DAILY
Status: DISCONTINUED | OUTPATIENT
Start: 2020-08-02 | End: 2020-08-03

## 2020-08-02 RX ORDER — LEVOTHYROXINE SODIUM 0.05 MG/1
50 TABLET ORAL
Status: DISCONTINUED | OUTPATIENT
Start: 2020-08-02 | End: 2020-08-03

## 2020-08-02 RX ORDER — HYDROCODONE BITARTRATE AND ACETAMINOPHEN 5; 325 MG/1; MG/1
1 TABLET ORAL EVERY 6 HOURS PRN
Status: DISCONTINUED | OUTPATIENT
Start: 2020-08-02 | End: 2020-08-03

## 2020-08-02 RX ORDER — SPIRONOLACTONE 25 MG/1
12.5 TABLET ORAL DAILY
Status: DISCONTINUED | OUTPATIENT
Start: 2020-08-02 | End: 2020-08-03

## 2020-08-02 RX ORDER — DEXTROSE MONOHYDRATE 25 G/50ML
50 INJECTION, SOLUTION INTRAVENOUS
Status: DISCONTINUED | OUTPATIENT
Start: 2020-08-02 | End: 2020-08-03

## 2020-08-02 RX ORDER — AMITRIPTYLINE HYDROCHLORIDE 25 MG/1
100 TABLET, FILM COATED ORAL 2 TIMES DAILY
Status: DISCONTINUED | OUTPATIENT
Start: 2020-08-02 | End: 2020-08-03

## 2020-08-02 RX ORDER — PANTOPRAZOLE SODIUM 40 MG/1
40 TABLET, DELAYED RELEASE ORAL
Status: DISCONTINUED | OUTPATIENT
Start: 2020-08-02 | End: 2020-08-03

## 2020-08-02 RX ORDER — METOPROLOL SUCCINATE 100 MG/1
100 TABLET, EXTENDED RELEASE ORAL
Status: DISCONTINUED | OUTPATIENT
Start: 2020-08-02 | End: 2020-08-03

## 2020-08-02 NOTE — ED NOTES
Patient arrives with complaints of chest pain which began 30 minutes pta. Patient clutching chest upon arrival. She states she reports it radiates to the back. Hx of afib. Respirations even and unlabored.

## 2020-08-02 NOTE — ED NOTES
Patient states that over the past several months she has been experiencing itching from the knee down on the right and left sides at night.  She states when she was admitted previously that she was given iv benadryl and topical antibiotics which seemed to i

## 2020-08-02 NOTE — H&P
[facility]PITAL[facility]  History and Physical     Sanjay Suncook Patient Status:  Observation    1945 MRN [id number]   [facility] 2NE-A Attending Scotty Yoon MD   Hosp Day # 0 PCP Tru Wood MD     Chief Complaint: Chest pain bifida s/p surgery   • Hearing loss in left ear 8/5/2016   • Heart palpitations    • HIGH BLOOD PRESSURE    • History of blood transfusion    • History of cardiac murmur    • Hypothyroid    • Leg swelling    • Muscle weakness     weakness right side burnin GASTRIC RESTRICTIVE PROCEDURE, WITH GASTRIC BYPASS FOR MORBID  2012    bowel obstructions complication needing x3 more surgeries   • NEEDLE BIOPSY RIGHT  ? ?   • OTHER SURGICAL HISTORY  1993    craniotomy chiari repair flo   • OTHER SURGICAL HISTORY  1 MOUTH ONCE DAILY, Disp: 90 tablet, Rfl: 3  calciTRIOL 0.25 MCG Oral Cap, Take 0.25 mcg by mouth daily. , Disp: , Rfl:   furosemide 20 MG Oral Tab, Take 1 tablet (20 mg total) by mouth daily. , Disp: 90 tablet, Rfl: 3  amLODIPine Besylate 5 MG Oral Tab, Take pulses. Chest and Back: No tenderness or deformity. Abdomen: Soft, nontender, nondistended. Positive bowel sounds. No rebound, guarding or organomegaly. Neurologic: No focal neurological deficits. CNII-XII grossly intact.   Musculoskeletal: Moves all e

## 2020-08-02 NOTE — PROGRESS NOTES
[facility]  Progress Note     Montse Skill Patient Status:  Observation    1945 MRN [id number]   [facility] 2NE-A Attending Abigail Worthy, [address] [facility] Day # 0 PCP Jose Elias Foreman MD     Chief Complaint: Chest pain    S: Patie amLODIPine Besylate  5 mg Oral Daily   • apixaban  2.5 mg Oral BID   • calciTRIOL  0.25 mcg Oral Daily   • Vitamin D3  2,000 Units Oral Daily   • spironolactone  12.5 mg Oral Daily   • Metoprolol Succinate ER  100 mg Oral Daily Beta Blocker   • Levothyroxi

## 2020-08-02 NOTE — ED NOTES
Report given to JUNI Irvin at [facility]. Patient updated on plan of care and agreeable. Respirations even and unlabored.  Awaiting transport

## 2020-08-02 NOTE — PLAN OF CARE
PT ADMITTED A/O, DENIES CHEST PAIN, RATES PAIN TO UPPER BACK AT 2, SR, HR 70'S, SCDS ON, IVF INFUSING, GIVEN TYLENOL FOR GENERALIZED PAIN, LABS IN AM,     Problem: PAIN - ADULT  Goal: Verbalizes/displays adequate comfort level or patient's stated pain goal

## 2020-08-02 NOTE — CONSULTS
Attending addendum:  I have seen and examined patient, discussed with NP. Agree with assessment and plan as outlined below.   Briefly, 66year old female with pmh pAF on eliquis, HTN, HL, DM, lung ca s/p resection and SHAD on CKD admitted with one day h/o c surgery   • Chronic left shoulder pain 6/8/2017    Back of left shoulder , declines intervention in 6.2017   • Chronic right shoulder pain 8/12/2014    After 2004- fall and injury to R shoulder   • Depression    • Diarrhea, unspecified    • Easy bruising GASTRIC BYPASS,OBESITY,SB RECONSTRUC  8/2012    multiple complications revisions 3 revisions, and JO ANN   • HC WOUND REPAIR SUTURING SUPPLY  2013    abd wound repair   • HYSTERECTOMY  1978   • KNEE REPLACEMENT SURGERY     • KNEE SURGERY  2013    L replacemen Amitriptyline HCl (ELAVIL) tab 100 mg, 100 mg, Oral, BID  •  amLODIPine Besylate (NORVASC) tab 5 mg, 5 mg, Oral, Daily  •  apixaban (ELIQUIS) tab 2.5 mg, 2.5 mg, Oral, BID  •  calciTRIOL (ROCALTROL) cap 0.25 mcg, 0.25 mcg, Oral, Daily  •  Vitamin D3 cap 2, bruits. Cardiac: Regular rate and rhythm, S1, S2 normal, no murmur, rub or gallop. Lungs: Clear without wheezes, rales, rhonchi or dullness. Normal excursions and effort. Abdomen: Soft, non-tender. Extremities: Without clubbing, cyanosis or edema.   P

## 2020-08-02 NOTE — ED PROVIDER NOTES
Patient Seen in: [facility] In [place]      History   Patient presents with:  Chest Pain Angina    Stated Complaint: chest pain    HPI    73-year-old with past medical history of NERIS. liu on Eliquis hypertension presents today with ches • Osteoarthrosis, unspecified whether generalized or localized, unspecified site     knees   • Paroxysmal A-fib ([facility] [address] 11/11/2014    Ablation in [address] [facility] 9/20/2010   • Pneumonia, organism unspecified(486)    • Rash    • Reflux esophagitis    • Right hip pain 1 OF TONSILS,<11 Y/O     • SHOULDER SURG PROC UNLISTED  1/1/2004    right   • SKIN SURGERY     • THORACOSCOPY/VATS Left 10/29/2014    Performed by Brad Millard, Bibiana Melissa MD at [address] [facility] Left 10/29/2014    Performed by Brad Millard, Bibiana Melissa MD at General: Skin is warm. Neurological:      General: No focal deficit present. Mental Status: She is alert. Cranial Nerves: No cranial nerve deficit. Sensory: No sensory deficit. Motor: No weakness.       Coordination: Coordination zia having chest pain and difficulty breathing, that started about one hour ago. FINDINGS:  Heart size is within normal limits. Pleural spaces appear clear. Mediastinal and hilar contours are normal.  No focal consolidation.   Postsurgical changes of the c follow-up provider specified.         Medications Prescribed:  Current Discharge Medication List                       Present on Admission  Date Reviewed: 7/20/2020          ICD-10-CM Noted POA    Chest pain, rule out acute myocardial infarction R07.9 8/1/

## 2020-08-02 NOTE — PLAN OF CARE
Assumed care of the patient @ 15:30, resting in bed. Alert x's 4, denies pain, headache gone. Eating turkey sandwich. IV patent, voiding freely, vitals stable on room air. SR/SB on tele.  at bedside. All needs met, questions answered.  Call light in

## 2020-08-02 NOTE — PLAN OF CARE
Assumed care at 0730. Patient a/o.vss. Sinus rhythm on tele. O2 saturations 100 room air . Introductions made and wipe board updated. All AM meds given. Call light within reach and bed in low postion. 2D Echo today and Abelino Flores in am per cards.  Poor apeti monitoring, monitor vital signs, obtain 12 lead EKG if indicated  - Evaluate effectiveness of antiarrhythmic and heart rate control medications as ordered  - Initiate emergency measures for life threatening arrhythmias  - Monitor electrolytes and administe

## 2020-08-03 ENCOUNTER — APPOINTMENT (OUTPATIENT)
Dept: CV DIAGNOSTICS | Facility: HOSPITAL | Age: 75
End: 2020-08-03
Attending: NURSE PRACTITIONER
Payer: MEDICARE

## 2020-08-03 VITALS
DIASTOLIC BLOOD PRESSURE: 53 MMHG | BODY MASS INDEX: 33.68 KG/M2 | TEMPERATURE: 98 F | RESPIRATION RATE: 18 BRPM | OXYGEN SATURATION: 99 % | HEIGHT: 63 IN | SYSTOLIC BLOOD PRESSURE: 152 MMHG | WEIGHT: 190.06 LBS | HEART RATE: 68 BPM

## 2020-08-03 LAB
ALBUMIN SERPL-MCNC: 3.1 G/DL (ref 3.4–5)
ALP LIVER SERPL-CCNC: 70 U/L (ref 55–142)
ALT SERPL-CCNC: 27 U/L (ref 13–56)
ANION GAP SERPL CALC-SCNC: 3 MMOL/L (ref 0–18)
AST SERPL-CCNC: 18 U/L (ref 15–37)
ATRIAL RATE: 58 BPM
BILIRUB DIRECT SERPL-MCNC: 0.1 MG/DL (ref 0–0.2)
BILIRUB SERPL-MCNC: 0.5 MG/DL (ref 0.1–2)
BUN BLD-MCNC: 35 MG/DL (ref 7–18)
BUN/CREAT SERPL: 23 (ref 10–20)
CALCIUM BLD-MCNC: 8.9 MG/DL (ref 8.5–10.1)
CHLORIDE SERPL-SCNC: 111 MMOL/L (ref 98–112)
CO2 SERPL-SCNC: 27 MMOL/L (ref 21–32)
CREAT BLD-MCNC: 1.52 MG/DL (ref 0.55–1.02)
GLUCOSE BLD-MCNC: 114 MG/DL (ref 70–99)
GLUCOSE BLD-MCNC: 95 MG/DL (ref 70–99)
LIPASE SERPL-CCNC: 116 U/L (ref 73–393)
M PROTEIN MFR SERPL ELPH: 6.4 G/DL (ref 6.4–8.2)
OSMOLALITY SERPL CALC.SUM OF ELEC: 300 MOSM/KG (ref 275–295)
P-R INTERVAL: 222 MS
POTASSIUM SERPL-SCNC: 4.6 MMOL/L (ref 3.5–5.1)
Q-T INTERVAL: 424 MS
QRS DURATION: 114 MS
QTC CALCULATION (BEZET): 416 MS
R AXIS: 84 DEGREES
SODIUM SERPL-SCNC: 141 MMOL/L (ref 136–145)
T AXIS: 77 DEGREES
VENTRICULAR RATE: 58 BPM

## 2020-08-03 PROCEDURE — 78452 HT MUSCLE IMAGE SPECT MULT: CPT | Performed by: NURSE PRACTITIONER

## 2020-08-03 PROCEDURE — 93018 CV STRESS TEST I&R ONLY: CPT | Performed by: NURSE PRACTITIONER

## 2020-08-03 PROCEDURE — 99217 OBSERVATION CARE DISCHARGE: CPT | Performed by: HOSPITALIST

## 2020-08-03 PROCEDURE — 93017 CV STRESS TEST TRACING ONLY: CPT | Performed by: NURSE PRACTITIONER

## 2020-08-03 NOTE — PLAN OF CARE
Patient alert and oriented x4. Vital signs stable, Sinus boom on telemetry. Saturating at 98% on room air. Denies any pain. Had stress test today. Medications administered as ordered. Patient updated on plan of care.  Call light within reach, will continue of antiarrhythmic and heart rate control medications as ordered  - Initiate emergency measures for life threatening arrhythmias  - Monitor electrolytes and administer replacement therapy as ordered  Outcome: Progressing     Problem: RESPIRATORY - ADULT  Go

## 2020-08-03 NOTE — PROGRESS NOTES
[facility]  Progress Note     Tawanda Dior Patient Status:  Observation    1945 MRN [id number]   [facility] 2NE-A Attending Simona Castellanos, [address] [facility] Day # 0 PCP Maximino Woodruff MD     Chief Complaint: Chest pain    S: Patie <0.045            Imaging: Imaging data reviewed in Epic.     Medications:   • Amitriptyline HCl  100 mg Oral BID   • amLODIPine Besylate  5 mg Oral Daily   • apixaban  2.5 mg Oral BID   • calciTRIOL  0.25 mcg Oral Daily   • Vitamin D3  2,000 Units Oral Shade Case

## 2020-08-03 NOTE — PLAN OF CARE
Preliminary nuclear stress test results as called to me by Dr. Chuy Ray:    LVEF 77% and negative for perfusion abnormalities.      DIONISIO Davis

## 2020-08-03 NOTE — PROGRESS NOTES
IV and tele discontinued. Reviewed discharge paperwork and medications with patient and family. Questions addressed and answered. Patient to be transported via wheelchair.

## 2020-08-03 NOTE — PLAN OF CARE
Pt alert and oriented x4. On RA. Denies any SOB or chest pain. NSR on tele. Continent to bowel and bladder. Pt states having chronic diarrhea s/p gastric bypass. Has denied any pain for me on this shift. Up with standby + cane.        Problem: PAIN - ADULT threatening arrhythmias  - Monitor electrolytes and administer replacement therapy as ordered  Outcome: Progressing     Problem: RESPIRATORY - ADULT  Goal: Achieves optimal ventilation and oxygenation  Description  INTERVENTIONS:  - Assess for changes in r

## 2020-08-03 NOTE — PLAN OF CARE
Normal stress test, lipase, LFTs. Ok to d/c home. F/u primary MD.  Keep f/u appts with cardiology in Nov and Jan, as previously scheduled.

## 2020-08-03 NOTE — DISCHARGE SUMMARY
[facility]  DISCHARGE SUMMARY     Dipika Wu Patient Status:  Observation    1945 MRN [id number]   [facility] 2NE-A Attending No att. providers found   Hosp Day # 0 PCP Brad Shelley MD     Date of Admission: 2020 mouth every 6 (six) hours as needed for Pain. Refills:  0     Amitriptyline HCl 50 MG Tabs  Commonly known as:  ELAVIL      Take 2 tablets (100 mg total) by mouth 2 (two) times daily.    Quantity:  360 tablet  Refills:  3     amLODIPine Besylate 5 MG Tabs 100 [address] [address] [phone]    On 11/3/2020  @ 11:00am for cardiology follow up    Appointments for Next 30 Days 8/3/2020 - 9/2/2020      Date Arrival Time Visit Type Length Department Provider     8/6/2020  1:20 PM  PHYSICAL

## 2020-08-03 NOTE — PROGRESS NOTES
[facility]  Cardiology Progress Note    Segundo Archer Patient Status:  Observation    1945 MRN [id number]   [facility] 2NE-A Attending Lulu Yu,    Hosp Day # 0 PCP Ag Wahl MD     Assessment:  Chest pain, back pa Alert and oriented,  Gross motor and sensory modalities preserved  Psychiatric: Normal mood and affect  Skin: Warm and dry, no obvious rashes     MEDICATIONS:  • Amitriptyline HCl  100 mg Oral BID   • amLODIPine Besylate  5 mg Oral Daily   • apixaban  2.5

## 2020-08-04 ENCOUNTER — PATIENT OUTREACH (OUTPATIENT)
Dept: CASE MANAGEMENT | Age: 75
End: 2020-08-04

## 2020-08-04 DIAGNOSIS — N18.30 CKD (CHRONIC KIDNEY DISEASE) STAGE 3, GFR 30-59 ML/MIN (HCC): Primary | ICD-10-CM

## 2020-08-04 DIAGNOSIS — Z02.9 ENCOUNTERS FOR UNSPECIFIED ADMINISTRATIVE PURPOSE: ICD-10-CM

## 2020-08-04 DIAGNOSIS — I10 ESSENTIAL HYPERTENSION: ICD-10-CM

## 2020-08-04 DIAGNOSIS — I50.32 CHRONIC DIASTOLIC HEART FAILURE (HCC): ICD-10-CM

## 2020-08-04 DIAGNOSIS — D50.8 OTHER IRON DEFICIENCY ANEMIA: ICD-10-CM

## 2020-08-04 DIAGNOSIS — I48.0 PAROXYSMAL A-FIB (HCC): ICD-10-CM

## 2020-08-04 NOTE — PROGRESS NOTES
Attempted to reach the patient to complete [facility]-Hospital FU call. Left a message for the pt to call NCM back at, [phone]. The number for the TCC was also given to the patient to schedule at, [phone].

## 2020-08-05 ENCOUNTER — TELEPHONE (OUTPATIENT)
Dept: FAMILY MEDICINE CLINIC | Facility: CLINIC | Age: 75
End: 2020-08-05

## 2020-08-05 NOTE — PROGRESS NOTES
NCM contacted pt for TCM, pt answered but stated this was not a good time to talk as she was leaving. Pt stated a CB later today would be fine. NCM to FU later as recommended.   Will need to send TE to PCP Office to FU on visit type for 8/6/20 appt is pt d

## 2020-08-05 NOTE — PROGRESS NOTES
Initial Post Discharge Follow Up   Discharge Date: 8/3/20  Contact Date: 8/5/2020    Consent Verification:  Assessment Completed With: Patient  HIPAA Verified?   Yes    Discharge Dx:     Atypical chest pain  SHAD on CKD    Was TCC ordered: yes    General:

## 2020-08-05 NOTE — TELEPHONE ENCOUNTER
NCM attempted to completed TCM however pt declined stating she was fine and seeing her PCP tomorrow. Pt does have an appt scheduled tomorrow, 8/6/20 however it is for a Medicare Annual Wellness Visit.   TCM/HFU appt recommended by 8/10/20  as pt is a high

## 2020-08-06 ENCOUNTER — OFFICE VISIT (OUTPATIENT)
Dept: FAMILY MEDICINE CLINIC | Facility: CLINIC | Age: 75
End: 2020-08-06
Payer: MEDICARE

## 2020-08-06 VITALS
HEIGHT: 62.5 IN | HEART RATE: 59 BPM | WEIGHT: 191 LBS | DIASTOLIC BLOOD PRESSURE: 66 MMHG | TEMPERATURE: 98 F | SYSTOLIC BLOOD PRESSURE: 122 MMHG | OXYGEN SATURATION: 98 % | RESPIRATION RATE: 17 BRPM | BODY MASS INDEX: 34.27 KG/M2

## 2020-08-06 DIAGNOSIS — Z13.228 SCREENING FOR ENDOCRINE, NUTRITIONAL, METABOLIC AND IMMUNITY DISORDER: ICD-10-CM

## 2020-08-06 DIAGNOSIS — Z13.21 SCREENING FOR ENDOCRINE, NUTRITIONAL, METABOLIC AND IMMUNITY DISORDER: ICD-10-CM

## 2020-08-06 DIAGNOSIS — E03.9 HYPOTHYROIDISM, UNSPECIFIED TYPE: ICD-10-CM

## 2020-08-06 DIAGNOSIS — Z13.0 SCREENING FOR ENDOCRINE, NUTRITIONAL, METABOLIC AND IMMUNITY DISORDER: ICD-10-CM

## 2020-08-06 DIAGNOSIS — Z13.29 SCREENING FOR ENDOCRINE, NUTRITIONAL, METABOLIC AND IMMUNITY DISORDER: ICD-10-CM

## 2020-08-06 DIAGNOSIS — N17.9 ACUTE KIDNEY INJURY (HCC): ICD-10-CM

## 2020-08-06 DIAGNOSIS — R07.89 ATYPICAL CHEST PAIN: Primary | ICD-10-CM

## 2020-08-06 LAB — TSI SER-ACNC: 2.49 MIU/ML (ref 0.36–3.74)

## 2020-08-06 PROCEDURE — 1111F DSCHRG MED/CURRENT MED MERGE: CPT | Performed by: EMERGENCY MEDICINE

## 2020-08-06 PROCEDURE — 99495 TRANSJ CARE MGMT MOD F2F 14D: CPT | Performed by: EMERGENCY MEDICINE

## 2020-08-06 NOTE — TELEPHONE ENCOUNTER
Please see below message. Please advise if you would like today's AWV appointment to be changed to TCM/HFU. Thank you!

## 2020-08-06 NOTE — PROGRESS NOTES
HPI:    Jessie Coronado is a 66year old female here today for hospital follow up.    She was discharged from Inpatient hospital, [facility] to Home   Admission Date: 8/1/20   Discharge Date: 8/3/20  Hospital Discharge Diagnoses (since 7/7/2020)   None and tegretol. Current Meds:  apixaban (ELIQUIS) 2.5 MG Oral Tab, Take 1 tablet (2.5 mg total) by mouth 2 (two) times daily. Amitriptyline HCl 50 MG Oral Tab, Take 2 tablets (100 mg total) by mouth 2 (two) times daily.   Pancrelipase, Lip-Prot-Amyl, 4200 hypertension, Fatigue, Gallstones (8/5/2016), H/O gastric bypass (2012), H/O gastric bypass, H/O spina bifida (1993), Hearing loss in left ear (8/5/2016), Heart palpitations, HIGH BLOOD PRESSURE, History of blood transfusion, History of cardiac murmur, Hyp GENERAL: weight stable, energy stable, no sweating  SKIN: denies any unusual skin lesions  EYES: denies blurred vision or double vision  HEENT: denies nasal congestion, sinus pain or ST  LUNGS: denies shortness of breath with exertion  CARDIOVASCULAR: de Future            1. Close follow up with Nephrology, Dr. Mervin Colindres  2. Close follow up with GI. Dr. Aubrey Salas for history of chronic Diarrhea  3. Ok to continue with OTC Tums  4. Take OTC Pepcid AC  5. To ER if with any worsening of Sx  6.  Follow up for medicare

## 2020-08-06 NOTE — PATIENT INSTRUCTIONS
Thank you for choosing [facility]  To Do:   [address]        1. Close follow up with Nephrology, Dr. Justin Luna  2. Close follow up with GI. Dr. Alondra Reed  3. Ok to continue with OTC Tums  4. Take OTC Pepcid AC  5.  To ER if with any worsening of S safe from infection. · Take your medicines exactly as directed. · You may require frequent blood and urine tests to monitor your kidney function. Follow-up care  Follow up with your healthcare provider, or as advised.   When to seek medical care  Call yo following:  · Pain changes in pattern  · Pain doesn't lessen or gets worse  · New symptoms appear  · Fever of 100.4ºF (38ºC) or higher, or as directed by your healthcare provider  Lawanda last reviewed this educational content on 4/1/2018  © [id number] The

## 2020-08-13 ENCOUNTER — TELEPHONE (OUTPATIENT)
Dept: FAMILY MEDICINE CLINIC | Facility: CLINIC | Age: 75
End: 2020-08-13

## 2020-08-13 NOTE — TELEPHONE ENCOUNTER
Called and spoke to patient verified two forms of identification. Informed patient with the following information down below. No questions at this time.

## 2020-08-13 NOTE — TELEPHONE ENCOUNTER
----- Message from Carloz Hurtado MD sent at 8/12/2020 10:17 PM CDT -----  Stable TSH  Continue with present dose of levothyroxine

## 2020-09-02 NOTE — TELEPHONE ENCOUNTER
Medication(s) to Refill:   Requested Prescriptions     Pending Prescriptions Disp Refills   • amLODIPine Besylate 5 MG Oral Tab 90 tablet 0     Sig: Take 1 tablet (5 mg total) by mouth daily.          Reason for Medication Refill being sent to Provider / Re

## 2020-09-03 RX ORDER — AMLODIPINE BESYLATE 5 MG/1
5 TABLET ORAL DAILY
Qty: 90 TABLET | Refills: 0 | Status: SHIPPED | OUTPATIENT
Start: 2020-09-03 | End: 2020-11-19

## 2020-09-03 RX ORDER — FUROSEMIDE 20 MG/1
20 TABLET ORAL DAILY
Qty: 90 TABLET | Refills: 3 | Status: SHIPPED | OUTPATIENT
Start: 2020-09-03 | End: 2021-09-10

## 2020-09-03 NOTE — TELEPHONE ENCOUNTER
Pt walked in stating she is totally out of her medication. It originally went to Dr Hilda Espitia in error. Can you please refill ASAP.     Also she is asking for refill on:     furosemide 20 MG Oral Tab

## 2020-09-08 ENCOUNTER — LAB ENCOUNTER (OUTPATIENT)
Dept: LAB | Age: 75
End: 2020-09-08
Attending: EMERGENCY MEDICINE
Payer: MEDICARE

## 2020-09-08 DIAGNOSIS — Z13.0 SCREENING FOR ENDOCRINE, NUTRITIONAL, METABOLIC AND IMMUNITY DISORDER: ICD-10-CM

## 2020-09-08 DIAGNOSIS — Z13.21 SCREENING FOR ENDOCRINE, NUTRITIONAL, METABOLIC AND IMMUNITY DISORDER: ICD-10-CM

## 2020-09-08 DIAGNOSIS — Z13.228 SCREENING FOR ENDOCRINE, NUTRITIONAL, METABOLIC AND IMMUNITY DISORDER: ICD-10-CM

## 2020-09-08 DIAGNOSIS — Z13.29 SCREENING FOR ENDOCRINE, NUTRITIONAL, METABOLIC AND IMMUNITY DISORDER: ICD-10-CM

## 2020-09-08 LAB
CHOLEST SMN-MCNC: 204 MG/DL (ref ?–200)
HDLC SERPL-MCNC: 62 MG/DL (ref 40–59)
LDLC SERPL CALC-MCNC: 91 MG/DL (ref ?–100)
NONHDLC SERPL-MCNC: 142 MG/DL (ref ?–130)
PATIENT FASTING Y/N/NP: YES
TRIGL SERPL-MCNC: 256 MG/DL (ref 30–149)
VLDLC SERPL CALC-MCNC: 51 MG/DL (ref 0–30)

## 2020-09-08 PROCEDURE — 80061 LIPID PANEL: CPT

## 2020-09-08 PROCEDURE — 36415 COLL VENOUS BLD VENIPUNCTURE: CPT

## 2020-09-10 ENCOUNTER — OFFICE VISIT (OUTPATIENT)
Dept: FAMILY MEDICINE CLINIC | Facility: CLINIC | Age: 75
End: 2020-09-10
Payer: MEDICARE

## 2020-09-10 VITALS
SYSTOLIC BLOOD PRESSURE: 118 MMHG | WEIGHT: 194 LBS | RESPIRATION RATE: 20 BRPM | HEART RATE: 87 BPM | OXYGEN SATURATION: 98 % | TEMPERATURE: 97 F | HEIGHT: 63 IN | DIASTOLIC BLOOD PRESSURE: 62 MMHG | BODY MASS INDEX: 34.37 KG/M2

## 2020-09-10 DIAGNOSIS — E21.3 HYPERPARATHYROIDISM (HCC): ICD-10-CM

## 2020-09-10 DIAGNOSIS — I10 ESSENTIAL HYPERTENSION: ICD-10-CM

## 2020-09-10 DIAGNOSIS — E66.01 SEVERE OBESITY (BMI 35.0-39.9) WITH COMORBIDITY (HCC): ICD-10-CM

## 2020-09-10 DIAGNOSIS — Z98.2 S/P VP SHUNT: ICD-10-CM

## 2020-09-10 DIAGNOSIS — Q07.00 ARNOLD-CHIARI DEFORMITY (HCC): ICD-10-CM

## 2020-09-10 DIAGNOSIS — I48.0 PAROXYSMAL A-FIB (HCC): ICD-10-CM

## 2020-09-10 DIAGNOSIS — E03.9 HYPOTHYROIDISM, UNSPECIFIED TYPE: ICD-10-CM

## 2020-09-10 DIAGNOSIS — Z00.00 ENCOUNTER FOR MEDICARE ANNUAL WELLNESS EXAM: Primary | ICD-10-CM

## 2020-09-10 DIAGNOSIS — D50.8 OTHER IRON DEFICIENCY ANEMIA: ICD-10-CM

## 2020-09-10 DIAGNOSIS — Z23 NEED FOR VACCINATION: ICD-10-CM

## 2020-09-10 PROBLEM — R07.9 CHEST PAIN, RULE OUT ACUTE MYOCARDIAL INFARCTION: Status: RESOLVED | Noted: 2020-08-01 | Resolved: 2020-09-10

## 2020-09-10 PROBLEM — C44.311 BASAL CELL CARCINOMA (BCC) OF LATERAL SIDE WALL OF NOSE: Status: RESOLVED | Noted: 2019-08-19 | Resolved: 2020-09-10

## 2020-09-10 PROBLEM — Z85.118 HISTORY OF ADENOCARCINOMA OF LUNG: Status: ACTIVE | Noted: 2020-09-10

## 2020-09-10 PROBLEM — Z85.828 HISTORY OF BASAL CELL CARCINOMA OF SKIN: Status: ACTIVE | Noted: 2020-09-10

## 2020-09-10 PROBLEM — R73.01 IFG (IMPAIRED FASTING GLUCOSE): Status: RESOLVED | Noted: 2019-08-19 | Resolved: 2020-09-10

## 2020-09-10 PROCEDURE — G0444 DEPRESSION SCREEN ANNUAL: HCPCS | Performed by: EMERGENCY MEDICINE

## 2020-09-10 PROCEDURE — 90662 IIV NO PRSV INCREASED AG IM: CPT | Performed by: EMERGENCY MEDICINE

## 2020-09-10 PROCEDURE — G0008 ADMIN INFLUENZA VIRUS VAC: HCPCS | Performed by: EMERGENCY MEDICINE

## 2020-09-10 PROCEDURE — G0439 PPPS, SUBSEQ VISIT: HCPCS | Performed by: EMERGENCY MEDICINE

## 2020-09-10 NOTE — PATIENT INSTRUCTIONS
Thank you for choosing [address] [place] [address] Group  To Do:   [address]        1. Follow up with Neurosurgery for  shunt, Dr. Luz Elean Yeh  2. FOllow up in 6 months for recheck  3.  Continue all medications and follow up with all specialties              ELLEN 181     TRIGLYCERIDES (mg/dL)   Date Value   12/27/2013 286 (H)     Triglycerides (mg/dL)   Date Value   09/08/2020 256 (H)        EKG - covered if needed at Welcome to Medicare, and non-screening if indicated for medical reasons Electrocardiogram date08/0 ordered in After Visit Summary   Pap and Pelvic      Pap: Every 3 yrs age 21-65 or Pap+HPV every 5 yrs age 33-67, Covered every 2 yrs up to age 79 or Yearly if High Risk   There are no preventive care reminders to display for this patient.      Chlamydia  A from the [facility]. http://www. idph.[facility]. [place]/public/books/advin.htm  A link to the [employer].  This site has a lot of good information including definitions of the different types of Advance Directive

## 2020-09-10 NOTE — PROGRESS NOTES
HPI:   Andrew Last is a 66year old female who presents for a Medicare Subsequent Annual Wellness visit (Pt already had Initial Annual Wellness).       Her last annual assessment has been over 1 year: Annual Physical due on 08/19/2020         Ess She smoked tobacco in the past but quit greater than 12 months ago.   Social History    Tobacco Use      Smoking status: Former Smoker        Packs/day: 0.00        Quit date: 3/16/1987        Years since quittin.5      Smokeless tobacco: kg)  08/01/20 : 190 lb 0.6 oz (86.2 kg)     Last Cholesterol Labs:   Lab Results   Component Value Date    CHOLEST 204 (H) 09/08/2020    HDL 62 (H) 09/08/2020    LDL 91 09/08/2020    TRIG 256 (H) 09/08/2020          Last Chemistry Labs:   Lab Results   Com has a past medical history of A-fib ([facility] [address] Adenocarcinoma, lung ([facility] [address] (10.2014), Anemia, Anxiety state, unspecified, Arrhythmia, Arthritis, Back pain, acute (11/11/2014), Back problem, Basal cell carcinoma (BCC) of lateral side wall of nose (8/19/2019), Bl surgical history (1993); other surgical history (1990); and other surgical history (11/02/2019).     Her family history includes CHF in her mother; Cancer in her father and sister; Diabetes in her mother; Hypertension in her mother; Pacemaker in her mother; teeth and gums normal   Neck: Supple, symmetrical, trachea midline, no adenopathy;  thyroid: not enlarged, symmetric, no tenderness/mass/nodules; no carotid bruit or JVD   Back:   Symmetric, no curvature, ROM normal, no CVA tenderness   Lungs:   Clear to a PLAN:    1. Follow up with Neurosurgery for  shunt, Dr. Shaunna Avelar  2. FOllow up in 6 months for recheck  3. Continue all medications and follow up with all specialties      The patient indicates understanding of these issues and agrees to the plan.   Re AA>50, > 65 No flowsheet data found. Bone Density Screening      Dexascan Every two years Last Dexa Scan:   XR DEXA BONE DENSITOMETRY (CPT=77080) 12/16/2019    No flowsheet data found.     Pap and Pelvic      Pap: Every 3 yrs age 21-65 or Pap+HPV (mmol/L)   Date Value   08/03/2020 4.6   03/26/2018 4.30     POTASSIUM (mmol/L)   Date Value   02/11/2013 3.5 (L)    No flowsheet data found.     Creatinine  Annually CREATININE (mg/dL)   Date Value   08/05/2014 1.04 (H)     Creatinine (mg/dL)   Date Value

## 2020-09-30 ENCOUNTER — LAB ENCOUNTER (OUTPATIENT)
Dept: LAB | Age: 75
End: 2020-09-30
Attending: INTERNAL MEDICINE
Payer: MEDICARE

## 2020-09-30 DIAGNOSIS — D63.1 ANEMIA OF CHRONIC RENAL FAILURE: Primary | ICD-10-CM

## 2020-09-30 DIAGNOSIS — N18.4 CHRONIC KIDNEY DISEASE, STAGE IV (SEVERE) (HCC): ICD-10-CM

## 2020-09-30 DIAGNOSIS — E55.9 AVITAMINOSIS D: ICD-10-CM

## 2020-09-30 DIAGNOSIS — N25.0 RENAL OSTEODYSTROPHY: ICD-10-CM

## 2020-09-30 DIAGNOSIS — N18.9 ANEMIA OF CHRONIC RENAL FAILURE: Primary | ICD-10-CM

## 2020-09-30 PROCEDURE — 82306 VITAMIN D 25 HYDROXY: CPT

## 2020-09-30 PROCEDURE — 36415 COLL VENOUS BLD VENIPUNCTURE: CPT

## 2020-09-30 PROCEDURE — 82043 UR ALBUMIN QUANTITATIVE: CPT

## 2020-09-30 PROCEDURE — 85025 COMPLETE CBC W/AUTO DIFF WBC: CPT

## 2020-09-30 PROCEDURE — 80053 COMPREHEN METABOLIC PANEL: CPT

## 2020-09-30 PROCEDURE — 82570 ASSAY OF URINE CREATININE: CPT

## 2020-09-30 PROCEDURE — 81001 URINALYSIS AUTO W/SCOPE: CPT

## 2020-09-30 PROCEDURE — 83735 ASSAY OF MAGNESIUM: CPT

## 2020-09-30 PROCEDURE — 83970 ASSAY OF PARATHORMONE: CPT

## 2020-10-09 ENCOUNTER — MED REC SCAN ONLY (OUTPATIENT)
Dept: FAMILY MEDICINE CLINIC | Facility: CLINIC | Age: 75
End: 2020-10-09

## 2020-11-18 DIAGNOSIS — E03.9 HYPOTHYROIDISM, UNSPECIFIED TYPE: ICD-10-CM

## 2020-11-19 RX ORDER — AMLODIPINE BESYLATE 5 MG/1
TABLET ORAL
Qty: 90 TABLET | Refills: 0 | Status: SHIPPED | OUTPATIENT
Start: 2020-11-19 | End: 2021-02-26

## 2020-11-19 RX ORDER — LEVOTHYROXINE SODIUM 0.05 MG/1
TABLET ORAL
Qty: 90 TABLET | Refills: 0 | Status: SHIPPED | OUTPATIENT
Start: 2020-11-19 | End: 2021-02-16

## 2020-11-23 DIAGNOSIS — D64.9 NORMOCYTIC NORMOCHROMIC ANEMIA: Primary | ICD-10-CM

## 2020-11-24 ENCOUNTER — OFFICE VISIT (OUTPATIENT)
Dept: HEMATOLOGY/ONCOLOGY | Age: 75
End: 2020-11-24
Attending: INTERNAL MEDICINE
Payer: MEDICARE

## 2020-11-24 VITALS
SYSTOLIC BLOOD PRESSURE: 111 MMHG | TEMPERATURE: 97 F | WEIGHT: 201 LBS | DIASTOLIC BLOOD PRESSURE: 62 MMHG | HEART RATE: 70 BPM | RESPIRATION RATE: 20 BRPM | BODY MASS INDEX: 36 KG/M2 | OXYGEN SATURATION: 94 %

## 2020-11-24 DIAGNOSIS — C34.12 MALIGNANT NEOPLASM OF UPPER LOBE OF LEFT LUNG (HCC): ICD-10-CM

## 2020-11-24 DIAGNOSIS — L29.9 ITCHING: ICD-10-CM

## 2020-11-24 DIAGNOSIS — R74.8 ELEVATED SERUM TRYPTASE: ICD-10-CM

## 2020-11-24 DIAGNOSIS — D64.9 NORMOCYTIC NORMOCHROMIC ANEMIA: Primary | ICD-10-CM

## 2020-11-24 PROCEDURE — 99215 OFFICE O/P EST HI 40 MIN: CPT | Performed by: INTERNAL MEDICINE

## 2020-11-24 RX ORDER — HYDROXYZINE HYDROCHLORIDE 25 MG/1
TABLET, FILM COATED ORAL EVERY 4 HOURS PRN
Qty: 60 TABLET | Refills: 3 | Status: SHIPPED | OUTPATIENT
Start: 2020-11-24 | End: 2021-04-05 | Stop reason: ALTCHOICE

## 2020-11-24 NOTE — PROGRESS NOTES
Cancer Center Progress Note    Patient Name: Alexa Amaya   YOB: 1945   Medical Record Number: [id number]   CSN: [id number]   Attending Physician: Ricky Leblanc M.D.    Referring Physician: Carloz Hurtado MD      Date of Visit: 11/24/20 Amitriptyline HCl 50 MG Oral Tab, Take 2 tablets (100 mg total) by mouth 2 (two) times daily. , Disp: 360 tablet, Rfl: 3  •  spironolactone 25 MG Oral Tab, 12.5 mg daily.   , Disp: , Rfl:   •  Metoprolol Succinate  MG Oral Tablet 24 Hr, TAKE ONE TABLET • Essential hypertension    • Fatigue    • Gallstones 8/5/2016   • H/O gastric bypass 2012   • H/O gastric bypass    • H/O spina bifida 1993    chairi malform spina bifida s/p surgery   • Hearing loss in left ear 8/5/2016   • Heart palpitations    • HIGH 2013    L replacement   • KNEE TOTAL REPLACEMENT Left 7/15/2013    Performed by Yesenia Arellano MD at [address] OF  1/2013    abd wound 2013   • LAPAROSCOPY, GASTRIC RESTRICTIVE PROCEDURE, WITH GASTRIC BYPASS FOR MORBID  2012    bowel 3/16/1987        Years since quittin.7      Smokeless tobacco: Never Used    Substance and Sexual Activity      Alcohol use: No      Drug use: No      Sexual activity: Not on file    Lifestyle      Physical activity        Days per week: Not on file Resp 20   Wt 91.2 kg (201 lb)   SpO2 94%   BMI 35.61 kg/m²       Physical Examination:  General: Patient is alert and oriented x 3, not in acute distress.  No rash but some areas of skin thickened with scabs and excoriation  Psych:  Mood and affect appropri Radiology:  PROCEDURE:  CT BRAIN OR HEAD (38327)     COMPARISON:  None. INDICATIONS:  Fall x2, weak     TECHNIQUE:  Noncontrast CT scanning is performed through the brain. Dose reduction techniques were used.  Dose information is transmitted to the transcribed by Technologist)  Patient states lung nodule monitoring scan, history of lung cancer, no current chest complaints. FINDINGS:    LUNGS:  There is volume loss in the left hemithorax.   The patient has had previous left thoracotomy and part the right humeral head and right scapula with fluid collection centrally. This may be related to an aggressive inflammatory arthritis in the absence   of any known metastatic lesion.           =====  CONCLUSION:    1.  Interval decrease in size of a right calcified granulomas. KIDNEYS:  Cortical thinning of left kidney. 18 x 17 mm parapelvic cyst of right kidney. No mass, obstruction, or calcification. ADRENALS:  No mass or enlargement. AORTA/VASCULAR:    Atherosclerosis. No aneurysm.   RETROPERITO Since 2/2020 Hb normalized and therefore bone marrow biopsy was deferred and did not f/u.       3. Lesion on nose- did see derm who excised lesion and felt this was [facility] but I do not see path in Epic    4.  Itching- back in 2017 she was referred to see me for

## 2020-11-24 NOTE — PROGRESS NOTES
Pt here for 1.5 year MD f/u. Pt notes having generalized itching, bad enough to break skin, keeps her awake at night. Pt has tried multiple creams, benadryl with no relief. Energy level has been low, appetite has been fair. Pt has chronic arthritic pain.  P

## 2020-12-01 ENCOUNTER — TELEPHONE (OUTPATIENT)
Dept: HEMATOLOGY/ONCOLOGY | Facility: HOSPITAL | Age: 75
End: 2020-12-01

## 2020-12-01 DIAGNOSIS — R74.8 ELEVATED SERUM TRYPTASE: Primary | ICD-10-CM

## 2020-12-01 NOTE — TELEPHONE ENCOUNTER
Spoke to Frankie Dakota- tryptase came back elevated but <21. Does not have typical skin lesions - in fact she has not rash, just itching and therefore have not been able to have a skin biopsy done. She wants to avoid bone marrow biopsy.  Will send periphera

## 2020-12-29 ENCOUNTER — APPOINTMENT (OUTPATIENT)
Dept: HEMATOLOGY/ONCOLOGY | Age: 75
End: 2020-12-29
Attending: INTERNAL MEDICINE
Payer: MEDICARE

## 2020-12-29 ENCOUNTER — LAB ENCOUNTER (OUTPATIENT)
Dept: LAB | Age: 75
End: 2020-12-29
Attending: INTERNAL MEDICINE
Payer: MEDICARE

## 2020-12-29 DIAGNOSIS — R74.8 ELEVATED SERUM TRYPTASE: ICD-10-CM

## 2020-12-29 DIAGNOSIS — C34.12 MALIGNANT NEOPLASM OF UPPER LOBE OF LEFT LUNG (HCC): ICD-10-CM

## 2020-12-29 DIAGNOSIS — D64.9 NORMOCYTIC NORMOCHROMIC ANEMIA: ICD-10-CM

## 2020-12-29 LAB
BASOPHILS # BLD AUTO: 0.05 X10(3) UL (ref 0–0.2)
BASOPHILS NFR BLD AUTO: 0.8 %
DEPRECATED RDW RBC AUTO: 44.3 FL (ref 35.1–46.3)
EOSINOPHIL # BLD AUTO: 0.27 X10(3) UL (ref 0–0.7)
EOSINOPHIL NFR BLD AUTO: 4.3 %
ERYTHROCYTE [DISTWIDTH] IN BLOOD BY AUTOMATED COUNT: 13.2 % (ref 11–15)
HCT VFR BLD AUTO: 36.2 %
HGB BLD-MCNC: 11.4 G/DL
IMM GRANULOCYTES # BLD AUTO: 0.01 X10(3) UL (ref 0–1)
IMM GRANULOCYTES NFR BLD: 0.2 %
IRON SATURATION: 22 %
IRON SERPL-MCNC: 67 UG/DL
LYMPHOCYTES # BLD AUTO: 1.51 X10(3) UL (ref 1–4)
LYMPHOCYTES NFR BLD AUTO: 23.8 %
MCH RBC QN AUTO: 28.6 PG (ref 26–34)
MCHC RBC AUTO-ENTMCNC: 31.5 G/DL (ref 31–37)
MCV RBC AUTO: 91 FL
MONOCYTES # BLD AUTO: 0.61 X10(3) UL (ref 0.1–1)
MONOCYTES NFR BLD AUTO: 9.6 %
NEUTROPHILS # BLD AUTO: 3.9 X10 (3) UL (ref 1.5–7.7)
NEUTROPHILS # BLD AUTO: 3.9 X10(3) UL (ref 1.5–7.7)
NEUTROPHILS NFR BLD AUTO: 61.3 %
PLATELET # BLD AUTO: 334 10(3)UL (ref 150–450)
RBC # BLD AUTO: 3.98 X10(6)UL
TOTAL IRON BINDING CAPACITY: 301 UG/DL (ref 240–450)
TRANSFERRIN SERPL-MCNC: 202 MG/DL (ref 200–360)
VIT B12 SERPL-MCNC: 590 PG/ML (ref 193–986)
WBC # BLD AUTO: 6.4 X10(3) UL (ref 4–11)

## 2020-12-29 PROCEDURE — 83520 IMMUNOASSAY QUANT NOS NONAB: CPT

## 2020-12-29 PROCEDURE — 83540 ASSAY OF IRON: CPT

## 2020-12-29 PROCEDURE — 36415 COLL VENOUS BLD VENIPUNCTURE: CPT

## 2020-12-29 PROCEDURE — 83550 IRON BINDING TEST: CPT

## 2020-12-29 PROCEDURE — 85025 COMPLETE CBC W/AUTO DIFF WBC: CPT

## 2020-12-29 PROCEDURE — 82607 VITAMIN B-12: CPT

## 2020-12-29 PROCEDURE — 83921 ORGANIC ACID SINGLE QUANT: CPT

## 2020-12-31 LAB — TRYPTASE: 15.6 UG/L

## 2021-01-01 LAB — MMA: 0.32 UMOL/L

## 2021-02-03 DIAGNOSIS — Z23 NEED FOR VACCINATION: ICD-10-CM

## 2021-02-16 DIAGNOSIS — E03.9 HYPOTHYROIDISM, UNSPECIFIED TYPE: ICD-10-CM

## 2021-02-16 RX ORDER — LEVOTHYROXINE SODIUM 0.05 MG/1
TABLET ORAL
Qty: 90 TABLET | Refills: 0 | Status: SHIPPED | OUTPATIENT
Start: 2021-02-16 | End: 2021-05-17

## 2021-02-26 RX ORDER — AMLODIPINE BESYLATE 5 MG/1
TABLET ORAL
Qty: 90 TABLET | Refills: 0 | Status: SHIPPED | OUTPATIENT
Start: 2021-02-26 | End: 2021-05-24

## 2021-03-11 ENCOUNTER — LAB ENCOUNTER (OUTPATIENT)
Dept: LAB | Age: 76
End: 2021-03-11
Attending: INTERNAL MEDICINE
Payer: MEDICARE

## 2021-03-11 DIAGNOSIS — N25.0 RENAL OSTEODYSTROPHY: ICD-10-CM

## 2021-03-11 DIAGNOSIS — D63.1 ANEMIA OF CHRONIC RENAL FAILURE: Primary | ICD-10-CM

## 2021-03-11 DIAGNOSIS — E55.9 AVITAMINOSIS D: ICD-10-CM

## 2021-03-11 DIAGNOSIS — N18.9 ANEMIA OF CHRONIC RENAL FAILURE: Primary | ICD-10-CM

## 2021-03-11 DIAGNOSIS — N18.4 CHRONIC KIDNEY DISEASE, STAGE IV (SEVERE) (HCC): ICD-10-CM

## 2021-03-11 LAB
ALBUMIN SERPL-MCNC: 3.5 G/DL (ref 3.4–5)
ALBUMIN/GLOB SERPL: 0.9 {RATIO} (ref 1–2)
ALP LIVER SERPL-CCNC: 68 U/L
ALT SERPL-CCNC: 17 U/L
ANION GAP SERPL CALC-SCNC: 7 MMOL/L (ref 0–18)
AST SERPL-CCNC: 15 U/L (ref 15–37)
BASOPHILS # BLD AUTO: 0.05 X10(3) UL (ref 0–0.2)
BASOPHILS NFR BLD AUTO: 0.8 %
BILIRUB SERPL-MCNC: 0.5 MG/DL (ref 0.1–2)
BILIRUB UR QL STRIP.AUTO: NEGATIVE
BUN BLD-MCNC: 38 MG/DL (ref 7–18)
BUN/CREAT SERPL: 19.7 (ref 10–20)
CALCIUM BLD-MCNC: 9.7 MG/DL (ref 8.5–10.1)
CHLORIDE SERPL-SCNC: 109 MMOL/L (ref 98–112)
CO2 SERPL-SCNC: 25 MMOL/L (ref 21–32)
COLOR UR AUTO: YELLOW
CREAT BLD-MCNC: 1.93 MG/DL
DEPRECATED RDW RBC AUTO: 44.9 FL (ref 35.1–46.3)
EOSINOPHIL # BLD AUTO: 0.33 X10(3) UL (ref 0–0.7)
EOSINOPHIL NFR BLD AUTO: 5.3 %
ERYTHROCYTE [DISTWIDTH] IN BLOOD BY AUTOMATED COUNT: 13.8 % (ref 11–15)
GLOBULIN PLAS-MCNC: 3.9 G/DL (ref 2.8–4.4)
GLUCOSE BLD-MCNC: 63 MG/DL (ref 70–99)
GLUCOSE UR STRIP.AUTO-MCNC: NEGATIVE MG/DL
HAV IGM SER QL: 2 MG/DL (ref 1.6–2.6)
HCT VFR BLD AUTO: 37.6 %
HGB BLD-MCNC: 12.1 G/DL
IMM GRANULOCYTES # BLD AUTO: 0.02 X10(3) UL (ref 0–1)
IMM GRANULOCYTES NFR BLD: 0.3 %
KETONES UR STRIP.AUTO-MCNC: NEGATIVE MG/DL
LYMPHOCYTES # BLD AUTO: 1.58 X10(3) UL (ref 1–4)
LYMPHOCYTES NFR BLD AUTO: 25.2 %
M PROTEIN MFR SERPL ELPH: 7.4 G/DL (ref 6.4–8.2)
MCH RBC QN AUTO: 28.9 PG (ref 26–34)
MCHC RBC AUTO-ENTMCNC: 32.2 G/DL (ref 31–37)
MCV RBC AUTO: 89.7 FL
MONOCYTES # BLD AUTO: 0.7 X10(3) UL (ref 0.1–1)
MONOCYTES NFR BLD AUTO: 11.2 %
NEUTROPHILS # BLD AUTO: 3.58 X10 (3) UL (ref 1.5–7.7)
NEUTROPHILS # BLD AUTO: 3.58 X10(3) UL (ref 1.5–7.7)
NEUTROPHILS NFR BLD AUTO: 57.2 %
NITRITE UR QL STRIP.AUTO: NEGATIVE
OSMOLALITY SERPL CALC.SUM OF ELEC: 299 MOSM/KG (ref 275–295)
PATIENT FASTING Y/N/NP: YES
PH UR STRIP.AUTO: 5 [PH] (ref 5–8)
PLATELET # BLD AUTO: 300 10(3)UL (ref 150–450)
POTASSIUM SERPL-SCNC: 4 MMOL/L (ref 3.5–5.1)
PROT UR STRIP.AUTO-MCNC: NEGATIVE MG/DL
PTH-INTACT SERPL-MCNC: 88.8 PG/ML (ref 18.5–88)
RBC # BLD AUTO: 4.19 X10(6)UL
SODIUM SERPL-SCNC: 141 MMOL/L (ref 136–145)
SP GR UR STRIP.AUTO: 1.01 (ref 1–1.03)
UROBILINOGEN UR STRIP.AUTO-MCNC: <2 MG/DL
VIT D+METAB SERPL-MCNC: 34.4 NG/ML (ref 30–100)
WBC # BLD AUTO: 6.3 X10(3) UL (ref 4–11)

## 2021-03-11 PROCEDURE — 36415 COLL VENOUS BLD VENIPUNCTURE: CPT

## 2021-03-11 PROCEDURE — 83970 ASSAY OF PARATHORMONE: CPT

## 2021-03-11 PROCEDURE — 85025 COMPLETE CBC W/AUTO DIFF WBC: CPT

## 2021-03-11 PROCEDURE — 80053 COMPREHEN METABOLIC PANEL: CPT

## 2021-03-11 PROCEDURE — 83735 ASSAY OF MAGNESIUM: CPT

## 2021-03-11 PROCEDURE — 82306 VITAMIN D 25 HYDROXY: CPT

## 2021-03-11 PROCEDURE — 81001 URINALYSIS AUTO W/SCOPE: CPT

## 2021-04-05 ENCOUNTER — OFFICE VISIT (OUTPATIENT)
Dept: FAMILY MEDICINE CLINIC | Facility: CLINIC | Age: 76
End: 2021-04-05
Payer: MEDICARE

## 2021-04-05 VITALS
HEIGHT: 63 IN | OXYGEN SATURATION: 99 % | TEMPERATURE: 97 F | WEIGHT: 203 LBS | BODY MASS INDEX: 35.97 KG/M2 | SYSTOLIC BLOOD PRESSURE: 120 MMHG | RESPIRATION RATE: 18 BRPM | DIASTOLIC BLOOD PRESSURE: 82 MMHG | HEART RATE: 64 BPM

## 2021-04-05 DIAGNOSIS — Q07.00 ARNOLD-CHIARI DEFORMITY (HCC): ICD-10-CM

## 2021-04-05 DIAGNOSIS — I10 ESSENTIAL HYPERTENSION: ICD-10-CM
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
## 2021-04-05 DIAGNOSIS — L29.9 PRURITUS: ICD-10-CM

## 2021-04-05 DIAGNOSIS — Z98.2 S/P VP SHUNT: ICD-10-CM

## 2021-04-05 DIAGNOSIS — E03.9 HYPOTHYROIDISM, UNSPECIFIED TYPE: Primary | ICD-10-CM

## 2021-04-05 PROCEDURE — 99214 OFFICE O/P EST MOD 30 MIN: CPT | Performed by: EMERGENCY MEDICINE

## 2021-04-05 NOTE — PROGRESS NOTES
Chief Complaint:   Patient presents with:   Follow - Up: 6 month f/u     HPI:   This is a 66year old female       Essential hypertension  On Amlodipine and metoprolol       Arnold-Chiari deformity (HCC)  S/P  shunt many years ago, approx 1991  C/O carmen chairi malform spina bifida s/p surgery   • Hearing loss in left ear 8/5/2016   • Heart palpitations    • HIGH BLOOD PRESSURE    • High cholesterol    • History of blood transfusion    • History of cardiac murmur    • Hypothyroid    • Itch of skin    • MORBID  2012    bowel obstructions complication needing x3 more surgeries   • NEEDLE BIOPSY RIGHT  ? ?   • OTHER SURGICAL HISTORY  1993    craniotomy chiari repair flo   • OTHER SURGICAL HISTORY  1990    spine repair spina bifida   • OTHER SURGICAL HIS MG Oral Tab, 12.5 mg daily. , Disp: , Rfl:   calciTRIOL 0.25 MCG Oral Cap, Take 0.25 mcg by mouth daily. , Disp: , Rfl:   HYDROcodone-acetaminophen (NORCO) 5-325 MG Oral Tab, Take 1 tablet by mouth every 6 (six) hours as needed for Pain., Disp: 20 tablet, skin        PHYSICAL EXAM:   /82   Pulse 64   Temp 97.4 °F (36.3 °C) (Temporal)   Resp 18   Ht 5' 3\" (1.6 m)   Wt 203 lb (92.1 kg)   LMP  (Approximate)   SpO2 99%   BMI 35.96 kg/m²  Estimated body mass index is 35.96 kg/m² as calculated from the fol total of 30 mins with the patient, greater than 50% of the time was spent on counseling regarding her medications, treatment options and discussion of her condition and plan of care.

## 2021-04-05 NOTE — PATIENT INSTRUCTIONS
Thank you for choosing [address] [place] [address] Group  To Do:   [address]        1. Have blood test done today  2. Follow up in 6 months  3. Regular BP checks  4. Consider physical therapy for instability  5. Use walker if needed for safety  6.  Consider neur most often 1 pill a day on an empty stomach. Use a pillbox labeled with the days of the week. This will help you remember to take your pill each day.   · Don’t take products that contain iron and calcium or antacids within 4 hours of taking your thyroid hor

## 2021-05-16 DIAGNOSIS — E03.9 HYPOTHYROIDISM, UNSPECIFIED TYPE: ICD-10-CM

## 2021-05-17 RX ORDER — LEVOTHYROXINE SODIUM 50 UG/1
TABLET ORAL
Qty: 90 TABLET | Refills: 0 | Status: SHIPPED | OUTPATIENT
Start: 2021-05-17 | End: 2021-08-16

## 2021-05-21 ENCOUNTER — TELEPHONE (OUTPATIENT)
Dept: FAMILY MEDICINE CLINIC | Facility: CLINIC | Age: 76
End: 2021-05-21

## 2021-05-21 RX ORDER — SPIRONOLACTONE 25 MG/1
25 TABLET ORAL DAILY
Qty: 90 TABLET | Refills: 0 | OUTPATIENT
Start: 2021-05-21

## 2021-05-21 NOTE — TELEPHONE ENCOUNTER
Spironolactone not refilled  I have not prescribed spironolactone   Will address meds at [facility] on Monday   pls have her bring all her pill bottles

## 2021-05-21 NOTE — TELEPHONE ENCOUNTER
Spoke to pt. She says the last few months she's been experiencing episodes of hypoglycemia. Pt w/ a PMH of DM but says this resolved after gastric bypass surgery in 2012.  She started checking her blood sugar the last couple of weeks and has had readings in

## 2021-05-21 NOTE — TELEPHONE ENCOUNTER
Pt states her glucose levels are as low as 43 and around 3am she was having vertigo, could not walk or stand and was sweating profusely.

## 2021-05-24 ENCOUNTER — LAB ENCOUNTER (OUTPATIENT)
Dept: LAB | Age: 76
End: 2021-05-24
Attending: EMERGENCY MEDICINE
Payer: MEDICARE

## 2021-05-24 ENCOUNTER — OFFICE VISIT (OUTPATIENT)
Dept: FAMILY MEDICINE CLINIC | Facility: CLINIC | Age: 76
End: 2021-05-24
Payer: MEDICARE

## 2021-05-24 VITALS
TEMPERATURE: 98 F | OXYGEN SATURATION: 96 % | HEART RATE: 61 BPM | SYSTOLIC BLOOD PRESSURE: 110 MMHG | DIASTOLIC BLOOD PRESSURE: 76 MMHG | WEIGHT: 209 LBS | BODY MASS INDEX: 37 KG/M2 | RESPIRATION RATE: 18 BRPM

## 2021-05-24 DIAGNOSIS — R42 VERTIGO: ICD-10-CM

## 2021-05-24 DIAGNOSIS — R42 VERTIGO: Primary | ICD-10-CM

## 2021-05-24 DIAGNOSIS — E03.9 HYPOTHYROIDISM, UNSPECIFIED TYPE: ICD-10-CM

## 2021-05-24 DIAGNOSIS — E03.8 OTHER SPECIFIED HYPOTHYROIDISM: ICD-10-CM

## 2021-05-24 DIAGNOSIS — I10 ESSENTIAL HYPERTENSION: ICD-10-CM

## 2021-05-24 DIAGNOSIS — E16.2 HYPOGLYCEMIA: ICD-10-CM

## 2021-05-24 DIAGNOSIS — Z91.81 AT RISK FOR FALLING: ICD-10-CM

## 2021-05-24 PROCEDURE — 99214 OFFICE O/P EST MOD 30 MIN: CPT | Performed by: EMERGENCY MEDICINE

## 2021-05-24 PROCEDURE — 36415 COLL VENOUS BLD VENIPUNCTURE: CPT

## 2021-05-24 PROCEDURE — 84443 ASSAY THYROID STIM HORMONE: CPT

## 2021-05-24 PROCEDURE — 81001 URINALYSIS AUTO W/SCOPE: CPT

## 2021-05-24 PROCEDURE — 80053 COMPREHEN METABOLIC PANEL: CPT

## 2021-05-24 PROCEDURE — 87086 URINE CULTURE/COLONY COUNT: CPT

## 2021-05-24 PROCEDURE — 85025 COMPLETE CBC W/AUTO DIFF WBC: CPT

## 2021-05-24 RX ORDER — FUROSEMIDE 20 MG/1
20 TABLET ORAL DAILY
Qty: 90 TABLET | Refills: 3 | Status: CANCELLED | OUTPATIENT
Start: 2021-05-24 | End: 2022-06-18

## 2021-05-24 RX ORDER — AMLODIPINE BESYLATE 5 MG/1
5 TABLET ORAL DAILY
Qty: 90 TABLET | Refills: 0 | Status: SHIPPED | OUTPATIENT
Start: 2021-05-24 | End: 2021-08-24

## 2021-05-24 RX ORDER — MECLIZINE HYDROCHLORIDE 25 MG/1
25 TABLET ORAL 3 TIMES DAILY PRN
Qty: 30 TABLET | Refills: 1 | Status: SHIPPED | OUTPATIENT
Start: 2021-05-24

## 2021-05-24 NOTE — PATIENT INSTRUCTIONS
Thank you for choosing [address] [place] [address] Group  To Do:   [address]        1. Blood tests today  2. Follow up with endocrinology  3. BE safe, use a walker  4. Take meclizine as needed for dizziness  5. Arrange for carotid ultrasounds  6.  Follow up in 1

## 2021-05-24 NOTE — PROGRESS NOTES
Chief Complaint:   Patient presents with:  Vertigo: Low blood sugar     HPI:   This is a 66year old female         HYPOGLYCEMIA  States that she gets low blood sugars 1-2 x a week  Has had problems with hypoglycemia over the past year  + hisory of gastr Food intolerance    • Gallstones 8/5/2016   • H/O gastric bypass 2012   • H/O gastric bypass    • H/O spina bifida 1993    chairi malform spina bifida s/p surgery   • Hearing loss in left ear 8/5/2016   • Heart palpitations    • HIGH BLOOD PRESSURE    • Hi replacement   • LAPAROSCOPIC REPAIR OF  1/2013    abd wound 2013   • LAPAROSCOPY, GASTRIC RESTRICTIVE PROCEDURE, WITH GASTRIC BYPASS FOR MORBID  2012    bowel obstructions complication needing x3 more surgeries   • NEEDLE BIOPSY RIGHT  ? ?   • OTHER SURGICA Disp: 180 tablet, Rfl: 3  Metoprolol Succinate  MG Oral Tablet 24 Hr, Take 1 tablet by mouth once daily, Disp: 90 tablet, Rfl: 2  furosemide 20 MG Oral Tab, Take 1 tablet (20 mg total) by mouth daily. , Disp: 90 tablet, Rfl: 3  Amitriptyline HCl 50 MG annular calcification     Diastolic dysfunction with heart failure (HCC)     Hyperuricemia     Vitamin D deficiency     Severe obesity (BMI 35.0-39. 9) with comorbidity ([address]     Hyponatremia     S/P exploratory laparotomy     History of lobectomy of lung times daily as needed for Dizziness or Nausea. Dispense: 30 tablet; Refill: 1  - OP REFERRAL TO [facility]  - US CAROTID DOPPLER BILAT - DIAG IMG (CPT=93880); Future  - URINALYSIS WITH CULTURE REFLEX; Future    2.  Hypoglycemia  - ENDO

## 2021-05-27 RX ORDER — SODIUM CHLORIDE, SODIUM LACTATE, POTASSIUM CHLORIDE, CALCIUM CHLORIDE 600; 310; 30; 20 MG/100ML; MG/100ML; MG/100ML; MG/100ML
INJECTION, SOLUTION INTRAVENOUS CONTINUOUS
Status: CANCELLED | OUTPATIENT
Start: 2021-05-27

## 2021-06-11 ENCOUNTER — HOSPITAL ENCOUNTER (OUTPATIENT)
Dept: ULTRASOUND IMAGING | Age: 76
Discharge: HOME | End: 2021-06-11
Attending: EMERGENCY MEDICINE
Payer: MEDICARE

## 2021-06-11 DIAGNOSIS — R42 VERTIGO: ICD-10-CM

## 2021-06-11 PROCEDURE — 93880 EXTRACRANIAL BILAT STUDY: CPT | Performed by: EMERGENCY MEDICINE

## 2021-06-14 ENCOUNTER — LAB ENCOUNTER (OUTPATIENT)
Dept: LAB | Age: 76
End: 2021-06-14
Attending: STUDENT IN AN ORGANIZED HEALTH CARE EDUCATION/TRAINING PROGRAM
Payer: MEDICARE

## 2021-06-14 DIAGNOSIS — R19.7 DIARRHEA, UNSPECIFIED TYPE: ICD-10-CM

## 2021-06-17 ENCOUNTER — ANESTHESIA EVENT (OUTPATIENT)
Dept: ENDOSCOPY | Facility: HOSPITAL | Age: 76
End: 2021-06-17
Payer: MEDICARE

## 2021-06-17 ENCOUNTER — ANESTHESIA (OUTPATIENT)
Dept: ENDOSCOPY | Facility: HOSPITAL | Age: 76
End: 2021-06-17
Payer: MEDICARE

## 2021-06-17 ENCOUNTER — HOSPITAL ENCOUNTER (OUTPATIENT)
Facility: HOSPITAL | Age: 76
Setting detail: HOSPITAL OUTPATIENT SURGERY
Discharge: HOME | End: 2021-06-17
Attending: STUDENT IN AN ORGANIZED HEALTH CARE EDUCATION/TRAINING PROGRAM | Admitting: STUDENT IN AN ORGANIZED HEALTH CARE EDUCATION/TRAINING PROGRAM
Payer: MEDICARE

## 2021-06-17 VITALS
TEMPERATURE: 98 F | BODY MASS INDEX: 36.32 KG/M2 | HEIGHT: 63 IN | OXYGEN SATURATION: 100 % | DIASTOLIC BLOOD PRESSURE: 71 MMHG | RESPIRATION RATE: 16 BRPM | HEART RATE: 66 BPM | WEIGHT: 205 LBS | SYSTOLIC BLOOD PRESSURE: 162 MMHG

## 2021-06-17 DIAGNOSIS — R19.7 DIARRHEA, UNSPECIFIED TYPE: Primary | ICD-10-CM

## 2021-06-17 PROCEDURE — 0DBE8ZX EXCISION OF LARGE INTESTINE, VIA NATURAL OR ARTIFICIAL OPENING ENDOSCOPIC, DIAGNOSTIC: ICD-10-PCS | Performed by: STUDENT IN AN ORGANIZED HEALTH CARE EDUCATION/TRAINING PROGRAM

## 2021-06-17 PROCEDURE — 88305 TISSUE EXAM BY PATHOLOGIST: CPT | Performed by: STUDENT IN AN ORGANIZED HEALTH CARE EDUCATION/TRAINING PROGRAM

## 2021-06-17 RX ORDER — NALOXONE HYDROCHLORIDE 0.4 MG/ML
80 INJECTION, SOLUTION INTRAMUSCULAR; INTRAVENOUS; SUBCUTANEOUS AS NEEDED
Status: DISCONTINUED | OUTPATIENT
Start: 2021-06-17 | End: 2021-06-17

## 2021-06-17 RX ORDER — LIDOCAINE HYDROCHLORIDE 10 MG/ML
INJECTION, SOLUTION EPIDURAL; INFILTRATION; INTRACAUDAL; PERINEURAL AS NEEDED
Status: DISCONTINUED | OUTPATIENT
Start: 2021-06-17 | End: 2021-06-17 | Stop reason: SURG

## 2021-06-17 RX ORDER — SODIUM CHLORIDE, SODIUM LACTATE, POTASSIUM CHLORIDE, CALCIUM CHLORIDE 600; 310; 30; 20 MG/100ML; MG/100ML; MG/100ML; MG/100ML
INJECTION, SOLUTION INTRAVENOUS CONTINUOUS
Status: DISCONTINUED | OUTPATIENT
Start: 2021-06-17 | End: 2021-06-17

## 2021-06-17 RX ORDER — DEXTROSE MONOHYDRATE 25 G/50ML
50 INJECTION, SOLUTION INTRAVENOUS
Status: DISCONTINUED | OUTPATIENT
Start: 2021-06-17 | End: 2021-06-17

## 2021-06-17 RX ADMIN — LIDOCAINE HYDROCHLORIDE 50 MG: 10 INJECTION, SOLUTION EPIDURAL; INFILTRATION; INTRACAUDAL; PERINEURAL at 07:21:00

## 2021-06-17 NOTE — OPERATIVE REPORT
Colon operative report  Patient Name: Kendell Stoddard  Procedure: Colonoscopy with biopsy  Indication: diarrhea  Attending: Len Madison M.D. Consent: The risks, benefits, and alternatives were discussed with the patient / POA.   Risks included, but were left colon. 3. Stool throughout the entire colon, limiting examination for flat or small polyps. 4. No inflammation, polyps, or masses noted within the limits of this exam.  5. Large internal and external hemorrhoids. Impression: Findings as above.     R

## 2021-06-17 NOTE — H&P
Gastroenterology H/P  I have personally seen and examined the patient. Patient Name: Kathy Sprague  CC: diarrhea  HPI: Mrs Ginny Olivares has chronic diarrhea. Changes since her visit 4/13/21 are listed below.   Partial response to pancreatic enzyme kimberly c in joints    • Paroxysmal A-fib ([address] 11/11/2014    Ablation [place] [address] [facility] 9/20/2010   • Pneumonia, organism unspecified(486)    • Rash    • Reflux esophagitis    • Renal disorder    • Right hip pain 12/17/2014    R hip pain   • S/P knee replacement 8/5/2013    L file.     Allergies:   Lyrica [Pregabalin]     SWELLING    Comment:Caps  Mevacor [Lovastatin]    SWELLING    Comment:Tabs  Neurontin [Gabapent*    SWELLING    Comment:Tabs;  Radiology Contrast *      Tegretol                SWELLING    Comment:Tabs;  SocHx MAC  2. Informed consent was obtained from the patient for the above procedure. She was told indications, nature and outcome, alternatives, risks, and complications. She fully understood and agreed to the procedure.    3. Resume Eliquis after procedures  Pr

## 2021-06-17 NOTE — ANESTHESIA POSTPROCEDURE EVALUATION
[address] [facility] Patient Status:  Hospital Outpatient Surgery   Age/Gender 66year old female MRN [id number]   Location [id number] [place] [address] Attending tu, Lexis Norton MD   Hosp Day # 0 PCP MD Nina Sheridan Ciera

## 2021-06-17 NOTE — ANESTHESIA PREPROCEDURE EVALUATION
PRE-OP EVALUATION    Patient Name: Hardeep Healy    Admit Diagnosis: Diarrhea, unspecified type [R19.7]    Pre-op Diagnosis: Diarrhea, unspecified type [R19.7]    COLONOSCOPY    Anesthesia Procedure: COLONOSCOPY (N/A )    Surgeon(s) and Role:     * Mettu, Sublingual SL Tab, Place under the tongue daily. , Disp: , Rfl: , 6/16/2021 at Unknown time  Cholecalciferol (VITAMIN D) 2000 units Oral Cap, Take 2,000 Units by mouth daily.   , Disp: , Rfl: , 6/16/2021 at Unknown time  Magnesium 400 MG Oral Cap, Take 400 repair   • HYSTERECTOMY  1978   • KNEE REPLACEMENT SURGERY     • KNEE SURGERY  2013    L replacement   • LAPAROSCOPIC REPAIR OF  1/2013    abd wound 2013   • LAPAROSCOPY, GASTRIC RESTRICTIVE PROCEDURE, WITH GASTRIC BYPASS FOR MORBID  2012    bowel obstruct

## 2021-06-17 NOTE — PROGRESS NOTES
Discharge Instruction provided to patient below    Per Dr Horacio Miranda  1. Colonoscopy was negative for any evidence of cancer, polyps, Crohn's disease, or ulcerative colitis. The only findings were diverticulosis without diverticulitis and hemorrhoids.   2. Marko Marcos

## 2021-06-18 NOTE — PROGRESS NOTES
Terrilyn Meigs,    No evidence of microscopic colitis. I think we really need to focus on the pancreatic insufficiency. As previously discussed, you take this with every meal (after starting the meal, take the supplement).   If you notice partial improvement, but

## 2021-06-21 ENCOUNTER — TELEPHONE (OUTPATIENT)
Dept: FAMILY MEDICINE CLINIC | Facility: CLINIC | Age: 76
End: 2021-06-21

## 2021-06-21 NOTE — TELEPHONE ENCOUNTER
Called pt and informed her of US carotid result and below orders. Pt not in agreement with PT as pt stated \"I will not be doing that\". All questions answered and pt verbalized understanding.

## 2021-07-06 ENCOUNTER — HOSPITAL ENCOUNTER (EMERGENCY)
Age: 76
Discharge: HOME | End: 2021-07-06
Attending: EMERGENCY MEDICINE
Payer: MEDICARE

## 2021-07-06 ENCOUNTER — APPOINTMENT (OUTPATIENT)
Dept: GENERAL RADIOLOGY | Age: 76
End: 2021-07-06
Attending: EMERGENCY MEDICINE
Payer: MEDICARE

## 2021-07-06 VITALS
RESPIRATION RATE: 16 BRPM | HEART RATE: 72 BPM | SYSTOLIC BLOOD PRESSURE: 135 MMHG | DIASTOLIC BLOOD PRESSURE: 66 MMHG | TEMPERATURE: 98 F | OXYGEN SATURATION: 98 %

## 2021-07-06 DIAGNOSIS — S76.012A HIP STRAIN, LEFT, INITIAL ENCOUNTER: Primary | ICD-10-CM

## 2021-07-06 DIAGNOSIS — S76.212A STRAIN OF LEFT GROIN: ICD-10-CM

## 2021-07-06 PROCEDURE — 99283 EMERGENCY DEPT VISIT LOW MDM: CPT

## 2021-07-06 PROCEDURE — 73502 X-RAY EXAM HIP UNI 2-3 VIEWS: CPT | Performed by: EMERGENCY MEDICINE

## 2021-07-06 NOTE — ED PROVIDER NOTES
Patient Seen in: THE [facility] Emergency Department In [place]      History   Patient presents with:  Hip Pain    Stated Complaint: fell few weeks ago- c/o left hip pain     HPI/Subjective:   HPI    Patient is 40-year-old female presents emergency room with shoulder   • Depression    • Diarrhea, unspecified    • Easy bruising    • Esophageal reflux    • Fatigue    • Fibromyalgia    • Flatulence/gas pain/belching    • Food intolerance    • Gallstones 8/5/2016   • H/O gastric bypass 2012   • H/O gastric bypass Gaylen Blizzard, MD;  Location: [address] [place] [address] ENDOSCOPY   • GASTRIC BYPASS,OBESITY,[address]  8/2012    multiple complications revisions 3 revisions, and JO ANN   • HC WOUND REPAIR SUTURING SUPPLY  2013    abd wound repair   • HYSTERECTOMY  1978   • KNEE REPLACEMENT BRITTNY normocephalic, atraumatic. Pupils are 4 mm equally round and reactive to light. Oropharynx is clear. Mucous membranes are moist.  NECK: There is no focal tenderness to palpation appreciated. There is no JVD.  No meningeal signs or nuchal rigidity appreciate 12:33 patient sitting back and breathing easily in no apparent stress this time. Patient feeling better and wants to go home. Patient states she has been able to get around at home with a walker as well as a cane.   Patient does not want anything ad

## 2021-07-06 NOTE — ED INITIAL ASSESSMENT (HPI)
Pt states 2 weeks ago felt dizzy and fell onto left side  swa her Dr in regards to history of dizziness.  Here today  For continued left hip pain

## 2021-07-07 ENCOUNTER — OFFICE VISIT (OUTPATIENT)
Dept: FAMILY MEDICINE CLINIC | Facility: CLINIC | Age: 76
End: 2021-07-07
Payer: MEDICARE

## 2021-07-07 VITALS
SYSTOLIC BLOOD PRESSURE: 136 MMHG | OXYGEN SATURATION: 97 % | HEART RATE: 66 BPM | TEMPERATURE: 98 F | RESPIRATION RATE: 18 BRPM | DIASTOLIC BLOOD PRESSURE: 64 MMHG

## 2021-07-07 DIAGNOSIS — R42 VERTIGO: Primary | ICD-10-CM

## 2021-07-07 DIAGNOSIS — S79.912A HIP INJURY, LEFT, INITIAL ENCOUNTER: ICD-10-CM

## 2021-07-07 DIAGNOSIS — Q07.00 ARNOLD-CHIARI DEFORMITY (HCC): ICD-10-CM

## 2021-07-07 DIAGNOSIS — Z91.81 AT RISK FOR FALLING: ICD-10-CM

## 2021-07-07 PROCEDURE — 99214 OFFICE O/P EST MOD 30 MIN: CPT | Performed by: EMERGENCY MEDICINE

## 2021-07-07 RX ORDER — AMITRIPTYLINE HYDROCHLORIDE 100 MG/1
100 TABLET, FILM COATED ORAL NIGHTLY
Qty: 90 TABLET | Refills: 1 | Status: SHIPPED | OUTPATIENT
Start: 2021-07-07 | End: 2021-09-27

## 2021-07-07 RX ORDER — HYDROCODONE BITARTRATE AND ACETAMINOPHEN 5; 325 MG/1; MG/1
1 TABLET ORAL EVERY 6 HOURS PRN
Qty: 30 TABLET | Refills: 0 | Status: SHIPPED | OUTPATIENT
Start: 2021-07-07 | End: 2021-09-27 | Stop reason: ALTCHOICE

## 2021-07-07 NOTE — PROGRESS NOTES
Chief Complaint:   Patient presents with:  Vertigo: F/u     HPI:   This is a 66year old female       [address] 2 weeks ago, got suddenly dizzy after bending down to pull a weed. Felll on left hip. Seen in ER yesterday due to hip and groin pain.   Jeanne Hernandez • Food intolerance    • Gallstones 8/5/2016   • H/O gastric bypass 2012   • H/O gastric bypass    • H/O spina bifida 1993    chairi malform spina bifida s/p surgery   • Hearing loss in left ear 8/5/2016   • Heart palpitations    • HIGH BLOOD PRESSURE SUTURING SUPPLY  2013    abd wound repair   • HYSTERECTOMY  1978   • KNEE REPLACEMENT SURGERY     • KNEE SURGERY  2013    L replacement   • LAPAROSCOPIC REPAIR OF  1/2013    abd wound 2013   • LAPAROSCOPY, GASTRIC RESTRICTIVE PROCEDURE, WITH GASTRIC BYPASS Particles, Take 1 capsule (20,000 Units total) by mouth 3 (three) times daily with meals. , Disp: 90 capsule, Rfl: 5  triamcinolone acetonide 0.1 % External Ointment, Apply 1 Application topically 2 (two) times daily.  Do not use for more than 2 weeks contin anemia     Diarrhea due to malabsorption     Leg swelling     Nonrheumatic aortic valve insufficiency     Mitral valve annular calcification     Diastolic dysfunction with heart failure (HCC)     Hyperuricemia     Vitamin D deficiency     Severe obesity (B joint.    FINDINGS:   No signs of AVN, lytic or other destructive process, acute fracture, dislocation, acetabulum protrusio, or other acute x-ray abnormalities. Partially seen degenerative changes in the lower lumbar spine and lumbosacral junction.

## 2021-07-07 NOTE — PATIENT INSTRUCTIONS
Thank you for choosing [facility]  To Do:   [address]        1. Arrange for Physical therapy  2. Stay safe,  Use walker or cane  3. Ok to take Norco for severe pain  4.  Follow up in 2-3 weeks if not better otherwise follow up in Sept for moving your hand up to your shoulder. Then slowly lower your arm. · Repeat 5 times. Switch to the other arm. Build your staying power  Aerobic exercises make your heart and lungs stronger so you can keep moving longer.  Walking and swimming are 2 of the signals to the brain. It also makes you less likely to trip and fall. If bright lights make symptoms worse, dim the lights or lie in a dark room until the dizziness passes. Then turn the lights back to their normal level.   Tips:  · Keep a flashlight by the this educational content on 1/1/2020  © 1596-3459 [employer]. All rights reserved. This information is not intended as a substitute for professional medical care. Always follow your healthcare professional's instructions.

## 2021-07-21 ENCOUNTER — TELEPHONE (OUTPATIENT)
Dept: FAMILY MEDICINE CLINIC | Facility: CLINIC | Age: 76
End: 2021-07-21

## 2021-07-21 DIAGNOSIS — M85.80 OSTEOPENIA, UNSPECIFIED LOCATION: Primary | ICD-10-CM

## 2021-07-21 NOTE — TELEPHONE ENCOUNTER
Pt went to Dr hSaran Wilkins at [facility] and had 2 hip fractures. States Dr Amanda Lane told her to call pcp to get medication for osteoporosis.

## 2021-07-22 NOTE — TELEPHONE ENCOUNTER
I called pt and requested she have her records faxed to our office. Patient said she was laying down and could not get up to take down our fax number.  I told pt I would call 's office to see if they would send records and follow up with her if I ne

## 2021-07-22 NOTE — TELEPHONE ENCOUNTER
pls ask for orthopedic notes from Dr. Juana Jama and any repeat xrays that were done since I last saw her. Will need to review notes.

## 2021-07-23 NOTE — TELEPHONE ENCOUNTER
Notes from Ortho reviewed. Positive x-ray done at orthopedics office showing mildly displaced fracture of the superior rami of the left hemipelvis.     Will initiate treatment for pathologic fracture, osteopenia  Pls initiate Prolia  Patient is not a missy

## 2021-07-23 NOTE — TELEPHONE ENCOUNTER
Patient advised treatment of Prolia is recommended treatment but that we need approval with insurance first. Verbalized understanding.

## 2021-07-26 ENCOUNTER — MED REC SCAN ONLY (OUTPATIENT)
Dept: FAMILY MEDICINE CLINIC | Facility: CLINIC | Age: 76
End: 2021-07-26

## 2021-07-26 ENCOUNTER — HOSPITAL ENCOUNTER (OUTPATIENT)
Dept: MRI IMAGING | Age: 76
Discharge: HOME | End: 2021-07-26
Attending: ORTHOPAEDIC SURGERY
Payer: MEDICARE

## 2021-07-26 DIAGNOSIS — S32.9XXA FRACTURE OF UNSPECIFIED PARTS OF LUMBOSACRAL SPINE AND PELVIS, INITIAL ENCOUNTER FOR CLOSED FRACTURE (HCC): ICD-10-CM

## 2021-07-26 PROCEDURE — 73721 MRI JNT OF LWR EXTRE W/O DYE: CPT | Performed by: ORTHOPAEDIC SURGERY

## 2021-07-29 NOTE — TELEPHONE ENCOUNTER
Pt called and is requesting for updated status on approval or denial for Prolia. Please advise. Thank you.

## 2021-08-02 NOTE — TELEPHONE ENCOUNTER
Instrucciones Para [place]  (Home Care Instructions)    ACTIVIDAD: Descanse y tome todo con mucha calma las primeras 24 horas después de vanessa cirugía.  Ashu persona adulta responsable debe permanecer con usted endy roxanne periodo de tiempo.  Es normal sentirse sonoliento o sonolienta endy esas primeras horas.  Le recomendamos que no sarthak nada que requiera equilibrio, aminah decisiones a mucha coordinación de vanessa parte.    NO SARTHAK ESTO PURANTE LAS PRIMERAS 24 HORAS:   Manejar o conducir algún vehiculo, operar maquinarias o utilizar electrodomesticos.   Beber cerveza o algún otro tipo de bebida alcohólica.   Aminah decisiones importantes o firmar documentos legales.    INSTRUCCIONES ESPECIALES: *. ACTIVIDADES: A vanessa salida del hospital, el día de la cirugía se solicita que lo hace ninguna actividad física significativa y limitar las actividades mentales, fransisco usted ha tenido la sedación. El día después de la cirugía, usted puede reanudar poly actividades de la david diaria, pedro luis endy cuatro semanas, se recomienda que usted lo hace no hay actividades extenuantes o levantar objetos pesados ??(de más de 15 libras).     2. CONDUCCIÓN: Es posible que conduce cada vez que están fuera de medicamentos para el dolor y son capaces de realizar las actividades necesarias para conducir, es decir, torneado, flexión, torsión, etc.     3. HERIDA: No es raro que los pacientes experimentan hinchazón e incluso hematomas en el sitio de la reparación de la hernia. Con las hernias inguinales, a veces los moretones y la hinchazón pueden extenderse en el pene o en el escroto de los pacientes masculinos. [place] se resolverá en los próximos días.     4. ICE: por favor, utilice hielo en la herida para disminuir la hinchazón endy las primeras 24 horas y luego interrumpir.     5. Baño: El apósito se puede quitar dos días después de la cirugía y la herida puede entonces ser humedecido en ashu ducha fransisco normal, pedro luis evitar la inmersión en agua (baño  Will put thru the [facility] portal in the AM and keep pt posted. de riki) endy al menos ashu semana.     6. DOLOR DE MEDICAMENTOS: Se le dará ashu receta para medicamentos para el dolor al momento del rosetta. Por favor, tome esto fransisco se indica. Es importante recordar que no debe mendez medicamentos con el estómago vacío ya que esto puede causar náuseas.     7. función intestinal: Después de la reparación de la hernia, no es infrecuente que los pacientes experimentan estreñimiento. [place] se debe a la disminución de los niveles de actividad, así fransisco medicamentos para el dolor. Es posible que desee utilizar un ablandador fecal que comienza inmediatamente después de la cirugía, y es posible o no posible que necesite usar un laxante (leche de magnesia, Ex-Lax; Senokot, etc.) también.     8 Llama SI TIENE: (1) La fiebre a más de 1.010 M, (2) el pecho inusual o dolor en las piernas, (3) Drenaje o líquido desde la incisión que puede ser mal olor, aumento de sensibilidad o dolor en la herida o la herida bordes ya no están juntos, enrojecimiento o hinchazón en el sitio de la incisión. Por favor, no dude en llamar con cualquier otra pregunta.     9. EDI: Póngase en contacto con nuestra oficina al [phone] para ashu edi de seguimiento de 1 a 2 semanas después de vanessa procedimiento.     Si usted tiene alguna pregunta adicional, por favor no dude en llamar a la oficina y hablar con mí o el médico de anna.     Dirección de la oficina:  [address], [place] [address]        J Carlos OLIVAS            **    DIETA: Para evitar las nauseas, prosiga despacito con vanessa dieta a medida que pueda ir tolerándola mejor, evite comidas muy condimentadas o grasosas endy roxanne primer día.  Vaya agregando comidas más substanciadas a vanessa dieta a medida que asi lo indique vanessa médica.  Los bebés pueden beber leche preparada o formula, ásl fransisco también leche del seno de la madre a medida que vayan teniendo hambre.  SIGA AGREGANDO LIQUIDOS Y COMIDAS CON FIBRA PARA EVITAR  ESTREÑIMIENTO.    MEDICAMENTOS/MEDICINAS:  Vuelva a mendez poly medicamentos diarios.  [place] los medicamentos que se le prescribe con un poco de comida.  Si no le prescribe ningún tipo de medicamento, entonces puede mendez medicinas para el dolor que no contienen aspirina, si las necesita.  LAS MEDICINAS PARA EL DOLOR PUEDEN ESTREÑIRLE MUCHO.  [place] un suavizante para el excremento o materia fecal (stool softener) o un laxativo fransisco por ejemplo: senokot, pericolase, o leche de magnesia, si lo necesita.    La prescripción la administro *hydrocodone**.  La ultima sosis de medicina para el dolor fue administrada *4:00 pm; next dose due at 8:00 pm**.     Usted debe LIAMAR A VANESSA MEDICO si tiene los siguientes síntomas:   -   Kimberly fiebre más rosetta de 101 grados Fahrenheit.   -   Un dolor incesante aún con los medicamentos, o nauseas y vómito persistente.   -   Un sangrado excesivo (ben que traspasa los vendajes o gasas) o algúln tipo de drenaje inesperado que proviene de la henda.     -   Un color adame exagerado o hinchazón alrededor del área en donde se le hizo incisión o ignacio, o un drenaje de pus o con olor soren proveniente de la henda.   -    La inhabilidad de orinar o vaciar vanessa vejiga en 8 horas.   -    Problemas con a respiración o millicent en el pecho.    Usted debe llamar al 911 si se presentan problemas con el dolor al respirar o el pecho.  Si no se puede ponnoer en comunicación con un medica o con el centro de cirugía, usted debe ir a la estación de emergencia (emergency room) más cercana o a un centro de atención de urgencia (urgent care center).  El teléfono del medico es: *Dr. Myers [phone]**    LOS SÍNTOMAS DE UN LEVE RESFRIO SON MUY NORMALES.  ADEMÁS USTED PUEDE LLEGAR A SENTIR MILLICENT GENERALES DE MÚSCULOS, IRRITACIÓN EN LA GARGANTA, MILLICENT DE DELIO Y/O UN POCO DE NAUSEAS.    Sie tiene alguna pregunta, llame a vanessa médico.  Si vanessa médico no se encuentra disponible, por favor llame al Centro de Cirugía at  [phone].  el Centro está abierto de Lunes a Viernes desde las 7:00 de la manana hasta las 7:00 de la noche.  Brandon también puede llamar al CENTRO DE LLAMADAS SOBRE LA MINI o HEALTH HOTLINE.  Kriss está abierto viente y cuatro horas por rocky, siete melchor por semana, allí podrá hablar con ashu enfermera.  Llame al [phone], o al número gratis 2 [phone].    Mi firma a continuación indica que he recibido y entiendco estas instrucciones acera de los cuidados en la casa (Home Care Instructions)    Usjovi recibirá ashu encuesta en la correspondencia en las siguientes semanas y le pedimos que por favor tome un momento para completar lizzie encuesta y regresaría a hosotros.  Nuestro objetivó es brindarle un cuidado muy felder y par lo tanto apreciamos poly coméntanos.  Muchas solomon por joselito escogido el Centro de Cirugía de [facility].

## 2021-08-03 NOTE — TELEPHONE ENCOUNTER
PA initiated thru [employer]. Pt informed and will contact her once approved.   Calcium lab in system when ready

## 2021-08-03 NOTE — TELEPHONE ENCOUNTER
Med approved.   Pt contacted and she said she cannot drive and has some fractures and currently cannot drive so she will work on getting a ride for the lab and then will schedule appt for the [facility] afterwards

## 2021-08-16 ENCOUNTER — LAB ENCOUNTER (OUTPATIENT)
Dept: LAB | Age: 76
End: 2021-08-16
Attending: EMERGENCY MEDICINE
Payer: MEDICARE

## 2021-08-16 DIAGNOSIS — E03.9 HYPOTHYROIDISM, UNSPECIFIED TYPE: ICD-10-CM

## 2021-08-16 DIAGNOSIS — M85.80 OSTEOPENIA, UNSPECIFIED LOCATION: ICD-10-CM

## 2021-08-16 LAB — CALCIUM BLD-MCNC: 8.9 MG/DL (ref 8.5–10.1)

## 2021-08-16 PROCEDURE — 36415 COLL VENOUS BLD VENIPUNCTURE: CPT

## 2021-08-16 PROCEDURE — 82310 ASSAY OF CALCIUM: CPT

## 2021-08-16 RX ORDER — LEVOTHYROXINE SODIUM 50 UG/1
TABLET ORAL
Qty: 90 TABLET | Refills: 0 | Status: SHIPPED | OUTPATIENT
Start: 2021-08-16 | End: 2021-09-27

## 2021-08-24 DIAGNOSIS — I10 ESSENTIAL HYPERTENSION: ICD-10-CM

## 2021-08-24 RX ORDER — AMLODIPINE BESYLATE 5 MG/1
TABLET ORAL
Qty: 90 TABLET | Refills: 0 | Status: SHIPPED | OUTPATIENT
Start: 2021-08-24 | End: 2021-09-27

## 2021-09-10 RX ORDER — FUROSEMIDE 20 MG/1
TABLET ORAL
Qty: 90 TABLET | Refills: 0 | Status: SHIPPED | OUTPATIENT
Start: 2021-09-10 | End: 2021-12-13

## 2021-09-27 ENCOUNTER — OFFICE VISIT (OUTPATIENT)
Dept: FAMILY MEDICINE CLINIC | Facility: CLINIC | Age: 76
End: 2021-09-27
Payer: MEDICARE

## 2021-09-27 VITALS
HEIGHT: 63 IN | SYSTOLIC BLOOD PRESSURE: 118 MMHG | HEART RATE: 62 BPM | DIASTOLIC BLOOD PRESSURE: 76 MMHG | WEIGHT: 205 LBS | BODY MASS INDEX: 36.32 KG/M2 | OXYGEN SATURATION: 97 % | RESPIRATION RATE: 17 BRPM

## 2021-09-27 DIAGNOSIS — E03.9 HYPOTHYROIDISM, UNSPECIFIED TYPE: ICD-10-CM

## 2021-09-27 DIAGNOSIS — I50.32 CHRONIC DIASTOLIC HEART FAILURE (HCC): ICD-10-CM

## 2021-09-27 DIAGNOSIS — C34.12 MALIGNANT NEOPLASM OF UPPER LOBE OF LEFT LUNG (HCC): ICD-10-CM

## 2021-09-27 DIAGNOSIS — Q07.00 ARNOLD-CHIARI DEFORMITY (HCC): ICD-10-CM

## 2021-09-27 DIAGNOSIS — Z00.00 ENCOUNTER FOR ANNUAL HEALTH EXAMINATION: Primary | ICD-10-CM

## 2021-09-27 DIAGNOSIS — I10 ESSENTIAL HYPERTENSION: ICD-10-CM

## 2021-09-27 DIAGNOSIS — E66.01 SEVERE OBESITY (BMI 35.0-39.9) WITH COMORBIDITY (HCC): ICD-10-CM

## 2021-09-27 PROCEDURE — G0444 DEPRESSION SCREEN ANNUAL: HCPCS | Performed by: EMERGENCY MEDICINE

## 2021-09-27 PROCEDURE — G0439 PPPS, SUBSEQ VISIT: HCPCS | Performed by: EMERGENCY MEDICINE

## 2021-09-27 RX ORDER — AMITRIPTYLINE HYDROCHLORIDE 100 MG/1
100 TABLET, FILM COATED ORAL NIGHTLY
Qty: 90 TABLET | Refills: 1 | Status: SHIPPED | OUTPATIENT
Start: 2021-09-27

## 2021-09-27 RX ORDER — AMLODIPINE BESYLATE 5 MG/1
5 TABLET ORAL DAILY
Qty: 90 TABLET | Refills: 1 | Status: SHIPPED | OUTPATIENT
Start: 2021-09-27

## 2021-09-27 RX ORDER — LEVOTHYROXINE SODIUM 0.05 MG/1
50 TABLET ORAL DAILY
Qty: 90 TABLET | Refills: 1 | Status: SHIPPED | OUTPATIENT
Start: 2021-09-27

## 2021-09-27 NOTE — PATIENT INSTRUCTIONS
Thank you for choosing [facility]  To Do:   [address]        1. Continue follow up with nephrology Dr. Nadeen Jade  2. Continue follow up with cardiology  3. Arrange follow up with neurosurgery  4. Have blood tests done  5.  Follow up in 6 month patient is at high risk or previous colonoscopy was abnormal -    No recommendations at this time    Flexible Sigmoidoscopy   Covered every 4 years -    Fecal Occult Blood Test Covered annually -   Bone Density Screening    Bone density screening    Covere Value Date    CREATSERUM 1.84 (H) 05/24/2021         BUN Annually Lab Results   Component Value Date    BUN 34 (H) 05/24/2021       Drug Serum Conc Annually No results found for: DIGOXIN, DIG, VALP              Recommended Websites for Advanced Directives

## 2021-09-27 NOTE — PROGRESS NOTES
HPI:   Travis Gasca is a 64year old female who presents for a Medicare Subsequent Annual Wellness visit (Pt already had Initial Annual Wellness).       Her last annual assessment has been over 1 year: Annual Physical due on 09/27/2022         Essential walking?: Yes   She has problems with Daily Activities based on screening of functional status.    Problems with daily activities? : Yes       Depression Screening (PHQ-2/PHQ-9): Over the LAST 2 WEEKS   Little interest or pleasure in doing things: Several d pain of both shoulders     CKD (chronic kidney disease) stage 3, GFR 30-59 ml/min ([facility])     Iron deficiency anemia     Diarrhea due to malabsorption     Leg swelling     Nonrheumatic aortic valve insufficiency     Mitral valve annular calcification     Ericka daily as needed for Dizziness or Nausea. pancrelipase, Lip-Prot-Amyl, 14818-07483 units Oral Cap DR Particles, Take 1 capsule (20,000 Units total) by mouth 3 (three) times daily with meals.   triamcinolone acetonide 0.1 % External Ointment, Apply 1 Applica Pneumonia, organism unspecified(486), Rash, Reflux esophagitis, Renal disorder, Right hip pain (12/17/2014), S/P knee replacement (8/5/2013), S/P  shunt (1990), Shortness of breath, Shoulder joint dysfunction (2004), Sleep disturbance, Spina bifida with dysuria, vaginal discharge or itching, no complaint of urinary incontinence   MUSCULOSKELETAL: denies back pain  NEURO: denies headaches  PSYCHE: denies depression or anxiety  HEMATOLOGIC: denies hx of anemia  ENDOCRINE: denies thyroid history  ALL/ASTHMA: Administered   • FLU VAC High Dose 65 YRS & Older PRSV Free (63679) 09/10/2020   • Pneumococcal (Prevnar 13) 12/17/2014   • Pneumovax 23 11/21/2019   Deferred Date(s) Deferred   • FLUZONE 6 months and older PFS 0.5 ml (16830) 12/14/2017        ASSESSMENT A Light  How would you describe your current health state?: Fair  How do you maintain positive mental well-being?: Kailash     Supplementary Documentation:   [place] Pay SCREENING SCHEDULE   Tests on this list are recommended by your physician but may n estrogen deficiency.     Covered yearly for long-term glucocorticoid medication use (Steroids) Last Dexa Scan:    XR DEXA BONE DENSITOMETRY (CPT=77080) 12/16/2019      No recommendations at this time   Pap and Pelvic    Pap   Covered every 2 years for women

## 2021-10-13 ENCOUNTER — LAB ENCOUNTER (OUTPATIENT)
Dept: LAB | Age: 76
End: 2021-10-13
Attending: INTERNAL MEDICINE
Payer: MEDICARE

## 2021-10-13 ENCOUNTER — LAB ENCOUNTER (OUTPATIENT)
Dept: LAB | Age: 76
End: 2021-10-13
Attending: EMERGENCY MEDICINE
Payer: MEDICARE

## 2021-10-13 DIAGNOSIS — I10 ESSENTIAL HYPERTENSION: ICD-10-CM

## 2021-10-13 DIAGNOSIS — N18.4 CHRONIC KIDNEY DISEASE, STAGE IV (SEVERE) (HCC): ICD-10-CM

## 2021-10-13 DIAGNOSIS — D63.1 ANEMIA OF CHRONIC RENAL FAILURE: Primary | ICD-10-CM

## 2021-10-13 DIAGNOSIS — E66.01 SEVERE OBESITY (BMI 35.0-39.9) WITH COMORBIDITY (HCC): ICD-10-CM

## 2021-10-13 DIAGNOSIS — Z00.00 ENCOUNTER FOR ANNUAL HEALTH EXAMINATION: ICD-10-CM

## 2021-10-13 DIAGNOSIS — N18.9 ANEMIA OF CHRONIC RENAL FAILURE: Primary | ICD-10-CM

## 2021-10-13 DIAGNOSIS — E03.9 HYPOTHYROIDISM, UNSPECIFIED TYPE: ICD-10-CM

## 2021-10-13 DIAGNOSIS — E55.9 AVITAMINOSIS D: ICD-10-CM

## 2021-10-13 DIAGNOSIS — N25.0 RENAL OSTEODYSTROPHY: ICD-10-CM

## 2021-10-13 PROCEDURE — 82043 UR ALBUMIN QUANTITATIVE: CPT

## 2021-10-13 PROCEDURE — 85025 COMPLETE CBC W/AUTO DIFF WBC: CPT

## 2021-10-13 PROCEDURE — 80061 LIPID PANEL: CPT

## 2021-10-13 PROCEDURE — 82570 ASSAY OF URINE CREATININE: CPT

## 2021-10-13 PROCEDURE — 82306 VITAMIN D 25 HYDROXY: CPT

## 2021-10-13 PROCEDURE — 81001 URINALYSIS AUTO W/SCOPE: CPT

## 2021-10-13 PROCEDURE — 36415 COLL VENOUS BLD VENIPUNCTURE: CPT

## 2021-10-13 PROCEDURE — 83970 ASSAY OF PARATHORMONE: CPT

## 2021-10-13 PROCEDURE — 80048 BASIC METABOLIC PNL TOTAL CA: CPT

## 2021-10-13 PROCEDURE — 84443 ASSAY THYROID STIM HORMONE: CPT

## 2021-10-18 ENCOUNTER — TELEPHONE (OUTPATIENT)
Dept: FAMILY MEDICINE CLINIC | Facility: CLINIC | Age: 76
End: 2021-10-18

## 2021-10-18 DIAGNOSIS — E78.5 DYSLIPIDEMIA: Primary | ICD-10-CM

## 2021-10-18 DIAGNOSIS — E21.3 HYPERPARATHYROIDISM (HCC): ICD-10-CM

## 2021-10-18 NOTE — TELEPHONE ENCOUNTER
----- Message from Chelsey Coughlin MD sent at 10/17/2021  6:03 PM CDT -----  Lipids are elevated, they were better previously. Pls  on low fat low cholesterol diet. Repeat Lipids in 3 months, if still elevated, will need statin therapy.     TSH sta

## 2021-11-26 ENCOUNTER — PATIENT OUTREACH (OUTPATIENT)
Dept: CASE MANAGEMENT | Age: 76
End: 2021-11-26

## 2021-12-13 RX ORDER — FUROSEMIDE 20 MG/1
TABLET ORAL
Qty: 90 TABLET | Refills: 0 | Status: SHIPPED | OUTPATIENT
Start: 2021-12-13

## 2022-01-17 ENCOUNTER — LAB ENCOUNTER (OUTPATIENT)
Dept: LAB | Age: 77
End: 2022-01-17
Attending: INTERNAL MEDICINE
Payer: MEDICARE

## 2022-01-17 DIAGNOSIS — I48.91 ATRIAL FIBRILLATION, UNSPECIFIED TYPE (HCC): ICD-10-CM

## 2022-01-18 LAB — SARS-COV-2 RNA RESP QL NAA+PROBE: NOT DETECTED

## 2022-02-17 ENCOUNTER — LAB ENCOUNTER (OUTPATIENT)
Dept: LAB | Age: 77
End: 2022-02-17
Attending: INTERNAL MEDICINE
Payer: MEDICARE

## 2022-02-17 DIAGNOSIS — D63.1 ANEMIA OF CHRONIC RENAL FAILURE: Primary | ICD-10-CM

## 2022-02-17 DIAGNOSIS — N18.32 CHRONIC KIDNEY DISEASE (CKD) STAGE G3B/A1, MODERATELY DECREASED GLOMERULAR FILTRATION RATE (GFR) BETWEEN 30-44 ML/MIN/1.73 SQUARE METER AND ALBUMINURIA CREATININE RATIO LESS THAN 30 MG/G (HCC): ICD-10-CM

## 2022-02-17 DIAGNOSIS — E55.9 VITAMIN D DEFICIENCY: ICD-10-CM

## 2022-02-17 DIAGNOSIS — N18.9 ANEMIA OF CHRONIC RENAL FAILURE: Primary | ICD-10-CM

## 2022-02-17 DIAGNOSIS — N25.0 RENAL OSTEODYSTROPHY: ICD-10-CM

## 2022-02-17 LAB
ANION GAP SERPL CALC-SCNC: 5 MMOL/L (ref 0–18)
BASOPHILS # BLD AUTO: 0.07 X10(3) UL (ref 0–0.2)
BASOPHILS NFR BLD AUTO: 0.9 %
BILIRUB UR QL STRIP.AUTO: NEGATIVE
BUN BLD-MCNC: 26 MG/DL (ref 7–18)
CALCIUM BLD-MCNC: 9.3 MG/DL (ref 8.5–10.1)
CHLORIDE SERPL-SCNC: 109 MMOL/L (ref 98–112)
CLARITY UR REFRACT.AUTO: CLEAR
CO2 SERPL-SCNC: 28 MMOL/L (ref 21–32)
CREAT BLD-MCNC: 1.73 MG/DL
EOSINOPHIL # BLD AUTO: 0.59 X10(3) UL (ref 0–0.7)
EOSINOPHIL NFR BLD AUTO: 7.7 %
ERYTHROCYTE [DISTWIDTH] IN BLOOD BY AUTOMATED COUNT: 13.6 %
FASTING STATUS PATIENT QL REPORTED: YES
GLUCOSE BLD-MCNC: 146 MG/DL (ref 70–99)
GLUCOSE UR STRIP.AUTO-MCNC: NEGATIVE MG/DL
HCT VFR BLD AUTO: 35 %
HGB BLD-MCNC: 11.1 G/DL
IMM GRANULOCYTES # BLD AUTO: 0.02 X10(3) UL (ref 0–1)
IMM GRANULOCYTES NFR BLD: 0.3 %
KETONES UR STRIP.AUTO-MCNC: NEGATIVE MG/DL
LEUKOCYTE ESTERASE UR QL STRIP.AUTO: NEGATIVE
LYMPHOCYTES # BLD AUTO: 2.17 X10(3) UL (ref 1–4)
LYMPHOCYTES NFR BLD AUTO: 28.3 %
MCH RBC QN AUTO: 29.8 PG (ref 26–34)
MCHC RBC AUTO-ENTMCNC: 31.7 G/DL (ref 31–37)
MCV RBC AUTO: 93.8 FL
MONOCYTES # BLD AUTO: 0.54 X10(3) UL (ref 0.1–1)
MONOCYTES NFR BLD AUTO: 7 %
NEUTROPHILS # BLD AUTO: 4.29 X10 (3) UL (ref 1.5–7.7)
NEUTROPHILS # BLD AUTO: 4.29 X10(3) UL (ref 1.5–7.7)
NEUTROPHILS NFR BLD AUTO: 55.8 %
NITRITE UR QL STRIP.AUTO: NEGATIVE
OSMOLALITY SERPL CALC.SUM OF ELEC: 301 MOSM/KG (ref 275–295)
PH UR STRIP.AUTO: 5 [PH] (ref 5–8)
PLATELET # BLD AUTO: 425 10(3)UL (ref 150–450)
POTASSIUM SERPL-SCNC: 4.1 MMOL/L (ref 3.5–5.1)
PROT UR STRIP.AUTO-MCNC: NEGATIVE MG/DL
PTH-INTACT SERPL-MCNC: 74.2 PG/ML (ref 18.5–88)
RBC # BLD AUTO: 3.73 X10(6)UL
RBC UR QL AUTO: NEGATIVE
SODIUM SERPL-SCNC: 142 MMOL/L (ref 136–145)
SP GR UR STRIP.AUTO: 1.01 (ref 1–1.03)
VIT D+METAB SERPL-MCNC: 49.8 NG/ML (ref 30–100)
WBC # BLD AUTO: 7.7 X10(3) UL (ref 4–11)

## 2022-02-17 PROCEDURE — 85025 COMPLETE CBC W/AUTO DIFF WBC: CPT

## 2022-02-17 PROCEDURE — 83970 ASSAY OF PARATHORMONE: CPT

## 2022-02-17 PROCEDURE — 36415 COLL VENOUS BLD VENIPUNCTURE: CPT

## 2022-02-17 PROCEDURE — 80048 BASIC METABOLIC PNL TOTAL CA: CPT

## 2022-02-17 PROCEDURE — 82306 VITAMIN D 25 HYDROXY: CPT

## 2022-02-17 PROCEDURE — 81003 URINALYSIS AUTO W/O SCOPE: CPT

## 2022-02-25 ENCOUNTER — MED REC SCAN ONLY (OUTPATIENT)
Dept: FAMILY MEDICINE CLINIC | Facility: CLINIC | Age: 77
End: 2022-02-25

## 2022-03-03 ENCOUNTER — LAB ENCOUNTER (OUTPATIENT)
Dept: LAB | Age: 77
End: 2022-03-03
Attending: NURSE PRACTITIONER
Payer: MEDICARE

## 2022-03-03 DIAGNOSIS — Z95.818 PRESENCE OF WATCHMAN LEFT ATRIAL APPENDAGE CLOSURE DEVICE: ICD-10-CM

## 2022-03-03 LAB
ANION GAP SERPL CALC-SCNC: 6 MMOL/L (ref 0–18)
BASOPHILS # BLD AUTO: 0.07 X10(3) UL (ref 0–0.2)
BASOPHILS NFR BLD AUTO: 1.1 %
BUN BLD-MCNC: 47 MG/DL (ref 7–18)
CALCIUM BLD-MCNC: 9.9 MG/DL (ref 8.5–10.1)
CHLORIDE SERPL-SCNC: 107 MMOL/L (ref 98–112)
CO2 SERPL-SCNC: 27 MMOL/L (ref 21–32)
CREAT BLD-MCNC: 1.74 MG/DL
EOSINOPHIL # BLD AUTO: 0.34 X10(3) UL (ref 0–0.7)
EOSINOPHIL NFR BLD AUTO: 5.5 %
ERYTHROCYTE [DISTWIDTH] IN BLOOD BY AUTOMATED COUNT: 13 %
FASTING STATUS PATIENT QL REPORTED: YES
GLUCOSE BLD-MCNC: 121 MG/DL (ref 70–99)
HCT VFR BLD AUTO: 35.3 %
HGB BLD-MCNC: 11.4 G/DL
IMM GRANULOCYTES # BLD AUTO: 0.02 X10(3) UL (ref 0–1)
IMM GRANULOCYTES NFR BLD: 0.3 %
LYMPHOCYTES # BLD AUTO: 1.87 X10(3) UL (ref 1–4)
LYMPHOCYTES NFR BLD AUTO: 30 %
MCH RBC QN AUTO: 30.2 PG (ref 26–34)
MCHC RBC AUTO-ENTMCNC: 32.3 G/DL (ref 31–37)
MCV RBC AUTO: 93.4 FL
MONOCYTES # BLD AUTO: 0.58 X10(3) UL (ref 0.1–1)
MONOCYTES NFR BLD AUTO: 9.3 %
NEUTROPHILS # BLD AUTO: 3.35 X10 (3) UL (ref 1.5–7.7)
NEUTROPHILS # BLD AUTO: 3.35 X10(3) UL (ref 1.5–7.7)
NEUTROPHILS NFR BLD AUTO: 53.8 %
OSMOLALITY SERPL CALC.SUM OF ELEC: 304 MOSM/KG (ref 275–295)
PLATELET # BLD AUTO: 353 10(3)UL (ref 150–450)
POTASSIUM SERPL-SCNC: 4.6 MMOL/L (ref 3.5–5.1)
RBC # BLD AUTO: 3.78 X10(6)UL
SODIUM SERPL-SCNC: 140 MMOL/L (ref 136–145)
WBC # BLD AUTO: 6.2 X10(3) UL (ref 4–11)

## 2022-03-03 PROCEDURE — 80048 BASIC METABOLIC PNL TOTAL CA: CPT

## 2022-03-03 PROCEDURE — 85025 COMPLETE CBC W/AUTO DIFF WBC: CPT

## 2022-03-03 PROCEDURE — 36415 COLL VENOUS BLD VENIPUNCTURE: CPT

## 2022-03-22 ENCOUNTER — TELEPHONE (OUTPATIENT)
Dept: FAMILY MEDICINE CLINIC | Facility: CLINIC | Age: 77
End: 2022-03-22

## 2022-03-22 NOTE — TELEPHONE ENCOUNTER
Patient called to cancel her appointment with Dr. Remy Lombardi as she is in the process of moving to [place]. Please remove Dr. Remy Lombardi as Pt's PCP, thanks you.

## 2022-08-06 NOTE — TELEPHONE ENCOUNTER
----- Message from Stephanie Swan MD sent at 6/18/2021 10:52 AM CDT -----  No significant carotid stenosis  Arrange for physical therapy as discussed for vertigo and dizziness  Pt to follow up in 6/24 as scheduled English

## 2023-03-09 NOTE — TELEPHONE ENCOUNTER
Patient in ER For information on Fall & Injury Prevention, visit: [email or web address] OR  [email or web address] OR  https://www.cdc.gov/steadi/patient.html

## 2023-05-17 NOTE — CM/SW NOTE
10/29/19 1400   CM/SW Screening   Referral [facility] staff; Chart review   Patient's Mental Status Alert;Oriented   Patient's [address]   Number of Levels in Home 2   Number of Stair in Home 13   Patient li                                                          [facility]                                                         CARDIOLOGY AT [facility]                                                                  [address] [facility] [place] [address]                                                         Telephone: [phone]. Fax:[phone]                                                                             PROGRESS NOTE    Reason for follow up:  PE  Update: Doing well  Saturating well   For mechanical thrombectomy today      Review of symptoms:   Cardiac:  No chest pain. No dyspnea. No palpitations.  Respiratory: no cough. No dyspnea  Gastrointestinal: No diarrhea. No abdominal pain. No bleeding.   Neuro: No focal neuro complaints.      Vitals:  T(C): 37 (05-17-23 @ 09:17), Max: 37 (05-17-23 @ 09:17)  HR: 74 (05-17-23 @ 09:17) (66 - 103)  BP: 107/76 (05-17-23 @ 09:17) (101/72 - 159/106)  RR: 16 (05-17-23 @ 05:05) (16 - 20)  SpO2: 95% (05-17-23 @ 09:17) (95% - 100%)  Wt(kg): --  I&O's Summary    Weight (kg): 67.9 (05-16 @ 14:35), 77.1 (05-16 @ 08:07)      PHYSICAL EXAM:  Appearance: Comfortable. No acute distress  HEENT:  Atraumatic. Normocephalic.  Normal oral mucosa  Neurologic: A & O x 3, no gross focal deficits.  Cardiovascular: RRR S1 S2,  regular  Respiratory: Lungs clear to auscultation, unlabored   Gastrointestinal:  Soft, Non-tender, + BS  Lower Extremities: No edema  Psychiatry: Patient is calm. No agitation.   Skin: warm and dry.      CURRENT MEDICATIONS:  MEDICATIONS  (STANDING):  heparin  Infusion.  Unit(s)/Hr (12 mL/Hr) IV Continuous <Continuous>  losartan 25 milliGRAM(s) Oral daily      LABS:	 	  CARDIAC MARKERS ( 17 May 2023 08:17 )  x     / 0.02 ng/mL / x     / x     / x      p-BNP 17 May 2023 08:17: x    , CARDIAC MARKERS ( 16 May 2023 15:18 )  x     / 0.14 ng/mL / x     / x     / x      p-BNP 16 May 2023 15:18: x    , CARDIAC MARKERS ( 16 May 2023 13:35 )  x     / x     / 51 U/L / x     / 2.8 ng/mL  p-BNP 16 May 2023 13:35: x                              14.6   7.95  )-----------( 183      ( 17 May 2023 02:24 )             42.5     05-17    141  |  105  |  19.7  ----------------------------<  103<H>  3.8   |  22.0  |  0.99    Ca    8.3<L>      17 May 2023 02:24  Mg     2.2     05-16    TPro  6.1<L>  /  Alb  3.5  /  TBili  0.6  /  DBili  x   /  AST  18  /  ALT  17  /  AlkPhos  84  05-17      TELEMETRY: NSR   ECG:  	      DIAGNOSTIC TESTING:  [ ] Echocardiogram: < from: TTE Echo Complete w/o Contrast w/ Doppler (05.16.23 @ 15:19) >  Summary:   1. Left ventricular ejection fraction, by visual estimation, is 60 to   65%.   2. Normal global left ventricular systolic function.   3. Severely enlarged right ventricle. Severely reduced RV systolic   function. RV apex appears hyperkinetic, with free wall hypokinesis   suggestive of Dougherty's sign.   4. Mildly enlarged right atrium.   5. Normal left atrial size.   6. Moderate tricuspid regurgitation.   7. Estimated pulmonary artery systolic pressure is 43.3 mmHg assuming a   right atrial pressure of 8 mmHg, which is consistent with mild pulmonary   hypertension.   8. Mild mitral annular calcification.   9. Thickening of the anterior and posterior mitral valve leaflets.  10. Trace mitral valve regurgitation.  11. Sclerotic aortic valve with normal opening.  12. There is no evidence of pericardial effusion.  13. There are no prior studies on this patient for comparison purposes.      < from: US Duplex Venous Lower Ext Complete, Bilateral (05.16.23 @ 21:53) >  LEFT:  Normal compressibility of the LEFT common femoral, femoral veins.  Doppler examination shows normal spontaneous and phasic flow.  Nonocclusive clot in the left popliteal vein and occlusive clot in the   left peroneal vein.    IMPRESSION:  Acute deep venous thrombosis: above and below the knee.  Nonocclusive clot in the left popliteal vein and occlusive clot in the   left peroneal vein.

## (undated) DEVICE — Device: Brand: DEFENDO AIR/WATER/SUCTION AND BIOPSY VALVE

## (undated) DEVICE — PROXIMATE RELOADABLE LINEAR STAPLER: Brand: PROXIMATE

## (undated) DEVICE — LAPAROTOMY SPONGE - RF AND X-RAY DETECTABLE PRE-WASHED: Brand: SITUATE

## (undated) DEVICE — 3M™ RED DOT™ MONITORING ELECTRODE WITH FOAM TAPE AND STICKY GEL, 50/BAG, 20/CASE, 72/PLT 2570: Brand: RED DOT™

## (undated) DEVICE — 1200CC GUARDIAN II: Brand: GUARDIAN

## (undated) DEVICE — VIOLET BRAIDED (POLYGLACTIN 910), SYNTHETIC ABSORBABLE SUTURE: Brand: COATED VICRYL

## (undated) DEVICE — KENDALL SCD EXPRESS SLEEVES, KNEE LENGTH, MEDIUM: Brand: KENDALL SCD

## (undated) DEVICE — PROXIMATE SKIN STAPLERS (35 WIDE) CONTAINS 35 STAINLESS STEEL STAPLES (FIXED HEAD): Brand: PROXIMATE

## (undated) DEVICE — GAMMEX® NON-LATEX PI TEXTURED SIZE 7.5, STERILE POLYISOPRENE POWDER-FREE SURGICAL GLOVE: Brand: GAMMEX

## (undated) DEVICE — SUTURE PDS II 1 TP-1

## (undated) DEVICE — ENDOSCOPY PACK - LOWER: Brand: MEDLINE INDUSTRIES, INC.

## (undated) DEVICE — GOWN,SIRUS,FABRIC-REINFORCED,X-LARGE: Brand: MEDLINE

## (undated) DEVICE — PROXIMATE RELOADABLE LINEAR CUTTER WITH SAFETY LOCK-OUT, 75MM: Brand: PROXIMATE

## (undated) DEVICE — SUTURE VICRYL 2-0

## (undated) DEVICE — MEDI-VAC NON-CONDUCTIVE SUCTION TUBING: Brand: CARDINAL HEALTH

## (undated) DEVICE — SOL  .9 1000ML BTL

## (undated) DEVICE — MEDI-VAC SUCTION HANDLE REGULAR CAPACITY: Brand: CARDINAL HEALTH

## (undated) DEVICE — DRAPE EQUIPMENT INTRATEMP THER

## (undated) DEVICE — FORCEP BIOPSY RJ4 LG CAP W/ND

## (undated) DEVICE — LAPAROTOMY CDS: Brand: MEDLINE INDUSTRIES, INC.

## (undated) DEVICE — PROXIMATE LINEAR CUTTER RELOAD, BLUE, 75MM: Brand: PROXIMATE

## (undated) DEVICE — CHLORAPREP 26ML APPLICATOR

## (undated) DEVICE — LIGASURE IMPACT OPEN DEVICE

## (undated) DEVICE — CLOSING BUNDLE: Brand: MEDLINE INDUSTRIES, INC.

## (undated) NOTE — MR AVS SNAPSHOT
[facility] [address]  [address] [facility] Leidy Trujillo  [phone]               Thank you for choosing us for your health care visit with Makenzie Daugherty MD.  We are glad to serve you and happy to provide you with t Barbara Gutiérrez in [facility] HEART AND SURGICAL [facility])    [address]   [phone]            Nov 07, 2017 10:00 AM   HEMATOLOGY ONCOLOGY FOLLOW UP with Carmina Shabazz MD   [facility] in [place] • You can NOW use the PROTEGOt to book your appointments with us, or consider to use open access scheduling which is through the [employer] website [email or web address] and type in Keerthi Daniel MD and follow the links for \"SCHEDULE ONLINE NOW\"    •The Lab is called informing you that the insurance company approved your testing, please call Central Scheduling at [phone]  Please allow our office 5 business days to contact you regarding any testing results.     Refill policies:   Allow 3 business days for ref Commonly known as:  PRILOSEC           TraMADol HCl 50 MG Tabs   Take 1 tablet (50 mg total) by mouth every 8 (eight) hours as needed for Pain (back and shoulder pain).    Commonly known as:  ULTRAM           Triamterene-HCTZ 37.5-25 MG Caps   Take 2 capsul

## (undated) NOTE — MR AVS SNAPSHOT
After Visit Summary   5/2/2017    Liugus Alaina    MRN: [id number]           Diagnoses this Visit     Malignant neoplasm of upper lobe of left lung [facility])    -  Primary       Allergies     Lyrica [Pregabalin] Swelling    Caps      Mevacor [employer] [facility]:  CT CHEST (CPT=71250)        Tuesday November 07, 2017 10:00 AM     Appointment with Didier Napier at [facility] in [place] [address])   Via Clarity     Visit [employer]  You can

## (undated) NOTE — LETTER
Your patient was recently seen at the [facility] for a hospital follow-up visit. The visit note is attached. Please contact the clinic with any questions at [phone].     Thank you,  BREANNE Power

## (undated) NOTE — ED AVS SNAPSHOT
Diane Sandoval   MRN: [id number]    Department:  [facility] Emergency Department   Date of Visit:  11/26/2019           Disclosure     Insurance plans vary and the physician(s) referred by the ER may not be covered by your plan.  Please contact you tell this physician (or your personal doctor if your instructions are to return to your personal doctor) about any new or lasting problems. The primary care or specialist physician will see patients referred from the [facility] Emergency Department.  Steve Ram

## (undated) NOTE — LETTER
November 26, 2021    [facility] [id number]    Dear Сергей Harris: It was a pleasure speaking with you over the phone recently.  To follow up, I wanted to send you some contact information to utilize when you have a questi Dariel Finnegan, [address] [facility] ([id number])  Office [phone]  [facility]. org

## (undated) NOTE — MR AVS SNAPSHOT
[facility] [address]  [address] [facility] Judith Garcia  [phone]               Thank you for choosing us for your health care visit with Pina Quan MD.  We are glad to serve you and happy to provide you with t Rowdy Leahy in [facility] HEART AND SURGICAL \[facility]\"")    [address]   [phone]            May 02, 2017 10:00 AM   HEMATOLOGY ONCOLOGY FOLLOW UP with Sabra Roldan MD   [facility] in [place] [phone] (For example: Holter Monitor, Cardiac Stress tests,Event Monitor, or 2D Echocardiograms)  •[facility] call [phone] usually in [address] [facility] in Clinic in [place] at [facility].  [address] Mon-Fri at 8am-7:30pm, and Sat/Sun 9am-4 To best provide you care, patients receiving maintenance medications need to be seen at least twice a year. .         Allergies as of Mar 07, 2017     Lyrica [Pregabalin] Swelling    Caps      Mevacor [Lovastatin] Swelling    Tabs      Neurontin [Gabapentin Phone:  [phone]    - Amitriptyline HCl 100 MG Tabs  - Triamterene-HCTZ 37.5-25 MG Caps      You can get these medications from any pharmacy     Bring a paper prescription for each of these medications    - TraMADol HCl 50 MG Tabs            Medicati HOW TO GET STARTED: HOW TO STAY MOTIVATED:   Start activities slowly and build up over time Do what you like   Get your heart pumping – brisk walking, biking, swimming Even 10 minute increments are effective and add up over the week   2 ½ hours per week –

## (undated) NOTE — ED AVS SNAPSHOT
Donn Burciaga   MRN: [id number]    Department:  THE [facility] [place] Emergency Department in [place]   Date of Visit:  2/5/2020           Disclosure     Insurance plans vary and the physician(s) referred by the ER may not be covered by your plan.  Please contact tell this physician (or your personal doctor if your instructions are to return to your personal doctor) about any new or lasting problems. The primary care or specialist physician will see patients referred from the [facility] Emergency Department.  Willie Weinberg

## (undated) NOTE — LETTER
Osiris De Luna [address] E  [place], [address] [facility]    Consent for Operation  Date: __________________                                Time: _______________    1.  I authorize the performance upon Andrew Last the following operation:  Procedure( procedure has been videotaped, the surgeon will obtain the original videotape. The hospital will not be responsible for storage or maintenance of this tape.   7. For the purpose of advancing medical education, I consent to the admittance of observers to the STATEMENTS REQUIRING INSERTION OR COMPLETION WERE FILLED IN.     Signature of Patient:   ___________________________    When the patient is a minor or mentally incompetent to give consent:  Signature of person authorized to consent for patient: ____________ supplements, and pills I can buy without a prescription (including street drugs/illegal medications). Failure to inform my anesthesiologist about these medicines may increase my risk of anesthetic complications. iv.  If I am allergic to anything or have ha Anesthesiologist Signature     Date   Time  I have discussed the procedure and information above with the patient (or patient’s representative) and answered their questions. The patient or their representative has agreed to have anesthesia services.     ___

## (undated) NOTE — Clinical Note
NCM attempted to complete TCM, pt declined and plans to go over her hospital stay at [address] scheduled tomorrow. TE was sent to FU on the VT. Pt stated she was fine but nothing more. Please complete med rec at [facility] appt. Thank you.